# Patient Record
Sex: FEMALE | Race: WHITE | NOT HISPANIC OR LATINO | Employment: OTHER | URBAN - METROPOLITAN AREA
[De-identification: names, ages, dates, MRNs, and addresses within clinical notes are randomized per-mention and may not be internally consistent; named-entity substitution may affect disease eponyms.]

---

## 2022-09-22 ENCOUNTER — APPOINTMENT (EMERGENCY)
Dept: CT IMAGING | Facility: HOSPITAL | Age: 87
End: 2022-09-22
Payer: COMMERCIAL

## 2022-09-22 ENCOUNTER — HOSPITAL ENCOUNTER (OUTPATIENT)
Facility: HOSPITAL | Age: 87
Setting detail: OBSERVATION
Discharge: HOME | End: 2022-09-23
Attending: EMERGENCY MEDICINE | Admitting: HOSPITALIST
Payer: COMMERCIAL

## 2022-09-22 ENCOUNTER — APPOINTMENT (OUTPATIENT)
Dept: RADIOLOGY | Facility: HOSPITAL | Age: 87
End: 2022-09-22
Payer: COMMERCIAL

## 2022-09-22 DIAGNOSIS — R42 VERTIGO: ICD-10-CM

## 2022-09-22 DIAGNOSIS — I15.9 SECONDARY HYPERTENSION: ICD-10-CM

## 2022-09-22 DIAGNOSIS — R42 DIZZINESS: Primary | ICD-10-CM

## 2022-09-22 PROBLEM — I10 HTN (HYPERTENSION): Status: ACTIVE | Noted: 2022-09-22

## 2022-09-22 PROBLEM — I48.0 PAROXYSMAL A-FIB (HCC): Status: ACTIVE | Noted: 2022-09-22

## 2022-09-22 LAB
2HR DELTA HS TROPONIN: -1 NG/L
4HR DELTA HS TROPONIN: -1 NG/L
ALBUMIN SERPL BCP-MCNC: 3.5 G/DL (ref 3.5–5)
ALP SERPL-CCNC: 81 U/L (ref 46–116)
ALT SERPL W P-5'-P-CCNC: 21 U/L (ref 12–78)
ANION GAP SERPL CALCULATED.3IONS-SCNC: 8 MMOL/L (ref 4–13)
APTT PPP: 41 SECONDS (ref 23–37)
AST SERPL W P-5'-P-CCNC: 16 U/L (ref 5–45)
ATRIAL RATE: 54 BPM
ATRIAL RATE: 57 BPM
BACTERIA UR QL AUTO: ABNORMAL /HPF
BASOPHILS # BLD AUTO: 0 THOUSANDS/ΜL (ref 0–0.1)
BASOPHILS NFR BLD AUTO: 0 % (ref 0–1)
BILIRUB DIRECT SERPL-MCNC: 0.13 MG/DL (ref 0–0.2)
BILIRUB SERPL-MCNC: 0.64 MG/DL (ref 0.2–1)
BILIRUB UR QL STRIP: NEGATIVE
BUN SERPL-MCNC: 13 MG/DL (ref 5–25)
CALCIUM SERPL-MCNC: 9.3 MG/DL (ref 8.3–10.1)
CARDIAC TROPONIN I PNL SERPL HS: 10 NG/L
CARDIAC TROPONIN I PNL SERPL HS: 9 NG/L
CARDIAC TROPONIN I PNL SERPL HS: 9 NG/L
CHLORIDE SERPL-SCNC: 97 MMOL/L (ref 96–108)
CLARITY UR: CLEAR
CO2 SERPL-SCNC: 32 MMOL/L (ref 21–32)
COLOR UR: YELLOW
CREAT SERPL-MCNC: 0.77 MG/DL (ref 0.6–1.3)
EOSINOPHIL # BLD AUTO: 0.1 THOUSAND/ΜL (ref 0–0.61)
EOSINOPHIL NFR BLD AUTO: 2 % (ref 0–6)
ERYTHROCYTE [DISTWIDTH] IN BLOOD BY AUTOMATED COUNT: 12.1 % (ref 11.6–15.1)
GFR SERPL CREATININE-BSD FRML MDRD: 68 ML/MIN/1.73SQ M
GLUCOSE SERPL-MCNC: 125 MG/DL (ref 65–140)
GLUCOSE UR STRIP-MCNC: NEGATIVE MG/DL
HCT VFR BLD AUTO: 40 % (ref 34.8–46.1)
HGB BLD-MCNC: 13.4 G/DL (ref 11.5–15.4)
HGB UR QL STRIP.AUTO: ABNORMAL
IMM GRANULOCYTES # BLD AUTO: 0.02 THOUSAND/UL (ref 0–0.2)
IMM GRANULOCYTES NFR BLD AUTO: 0 % (ref 0–2)
INR PPP: 1.33 (ref 0.84–1.19)
KETONES UR STRIP-MCNC: NEGATIVE MG/DL
LEUKOCYTE ESTERASE UR QL STRIP: NEGATIVE
LYMPHOCYTES # BLD AUTO: 1.29 THOUSANDS/ΜL (ref 0.6–4.47)
LYMPHOCYTES NFR BLD AUTO: 21 % (ref 14–44)
MAGNESIUM SERPL-MCNC: 2.1 MG/DL (ref 1.6–2.6)
MCH RBC QN AUTO: 29.5 PG (ref 26.8–34.3)
MCHC RBC AUTO-ENTMCNC: 33.5 G/DL (ref 31.4–37.4)
MCV RBC AUTO: 88 FL (ref 82–98)
MONOCYTES # BLD AUTO: 0.41 THOUSAND/ΜL (ref 0.17–1.22)
MONOCYTES NFR BLD AUTO: 7 % (ref 4–12)
NEUTROPHILS # BLD AUTO: 4.26 THOUSANDS/ΜL (ref 1.85–7.62)
NEUTS SEG NFR BLD AUTO: 70 % (ref 43–75)
NITRITE UR QL STRIP: NEGATIVE
NON-SQ EPI CELLS URNS QL MICRO: ABNORMAL /HPF
NRBC BLD AUTO-RTO: 0 /100 WBCS
P AXIS: 46 DEGREES
P AXIS: 50 DEGREES
PH UR STRIP.AUTO: 7.5 [PH] (ref 4.5–8)
PLATELET # BLD AUTO: 219 THOUSANDS/UL (ref 149–390)
PMV BLD AUTO: 9.2 FL (ref 8.9–12.7)
POTASSIUM SERPL-SCNC: 4 MMOL/L (ref 3.5–5.3)
PR INTERVAL: 228 MS
PR INTERVAL: 242 MS
PROT SERPL-MCNC: 7.5 G/DL (ref 6.4–8.4)
PROT UR STRIP-MCNC: NEGATIVE MG/DL
PROTHROMBIN TIME: 16.4 SECONDS (ref 11.6–14.5)
QRS AXIS: -11 DEGREES
QRS AXIS: 4 DEGREES
QRSD INTERVAL: 88 MS
QRSD INTERVAL: 90 MS
QT INTERVAL: 444 MS
QT INTERVAL: 460 MS
QTC INTERVAL: 432 MS
QTC INTERVAL: 436 MS
RBC # BLD AUTO: 4.55 MILLION/UL (ref 3.81–5.12)
RBC #/AREA URNS AUTO: ABNORMAL /HPF
SODIUM SERPL-SCNC: 137 MMOL/L (ref 135–147)
SP GR UR STRIP.AUTO: 1.02 (ref 1–1.03)
T WAVE AXIS: 16 DEGREES
T WAVE AXIS: 16 DEGREES
TSH SERPL DL<=0.05 MIU/L-ACNC: 1.66 UIU/ML (ref 0.45–4.5)
UROBILINOGEN UR QL STRIP.AUTO: 0.2 E.U./DL
VENTRICULAR RATE: 54 BPM
VENTRICULAR RATE: 57 BPM
WBC # BLD AUTO: 6.08 THOUSAND/UL (ref 4.31–10.16)
WBC #/AREA URNS AUTO: ABNORMAL /HPF

## 2022-09-22 PROCEDURE — 81001 URINALYSIS AUTO W/SCOPE: CPT

## 2022-09-22 PROCEDURE — 85730 THROMBOPLASTIN TIME PARTIAL: CPT | Performed by: EMERGENCY MEDICINE

## 2022-09-22 PROCEDURE — 96360 HYDRATION IV INFUSION INIT: CPT

## 2022-09-22 PROCEDURE — 70450 CT HEAD/BRAIN W/O DYE: CPT

## 2022-09-22 PROCEDURE — 99285 EMERGENCY DEPT VISIT HI MDM: CPT

## 2022-09-22 PROCEDURE — 83735 ASSAY OF MAGNESIUM: CPT | Performed by: EMERGENCY MEDICINE

## 2022-09-22 PROCEDURE — 36415 COLL VENOUS BLD VENIPUNCTURE: CPT | Performed by: EMERGENCY MEDICINE

## 2022-09-22 PROCEDURE — 80048 BASIC METABOLIC PNL TOTAL CA: CPT | Performed by: EMERGENCY MEDICINE

## 2022-09-22 PROCEDURE — 85025 COMPLETE CBC W/AUTO DIFF WBC: CPT | Performed by: EMERGENCY MEDICINE

## 2022-09-22 PROCEDURE — 93010 ELECTROCARDIOGRAM REPORT: CPT | Performed by: INTERNAL MEDICINE

## 2022-09-22 PROCEDURE — 84484 ASSAY OF TROPONIN QUANT: CPT | Performed by: EMERGENCY MEDICINE

## 2022-09-22 PROCEDURE — 71045 X-RAY EXAM CHEST 1 VIEW: CPT

## 2022-09-22 PROCEDURE — 80076 HEPATIC FUNCTION PANEL: CPT | Performed by: EMERGENCY MEDICINE

## 2022-09-22 PROCEDURE — G1004 CDSM NDSC: HCPCS

## 2022-09-22 PROCEDURE — 96361 HYDRATE IV INFUSION ADD-ON: CPT

## 2022-09-22 PROCEDURE — 84443 ASSAY THYROID STIM HORMONE: CPT | Performed by: EMERGENCY MEDICINE

## 2022-09-22 PROCEDURE — 85610 PROTHROMBIN TIME: CPT | Performed by: EMERGENCY MEDICINE

## 2022-09-22 PROCEDURE — 99223 1ST HOSP IP/OBS HIGH 75: CPT | Performed by: HOSPITALIST

## 2022-09-22 PROCEDURE — 99285 EMERGENCY DEPT VISIT HI MDM: CPT | Performed by: EMERGENCY MEDICINE

## 2022-09-22 PROCEDURE — 93005 ELECTROCARDIOGRAM TRACING: CPT

## 2022-09-22 RX ORDER — LANOLIN ALCOHOL/MO/W.PET/CERES
6 CREAM (GRAM) TOPICAL
Status: DISCONTINUED | OUTPATIENT
Start: 2022-09-22 | End: 2022-09-23 | Stop reason: HOSPADM

## 2022-09-22 RX ORDER — SIMVASTATIN 20 MG
1 TABLET ORAL DAILY
COMMUNITY
Start: 2022-09-01

## 2022-09-22 RX ORDER — OMEGA-3S/DHA/EPA/FISH OIL/D3 300MG-1000
1 CAPSULE ORAL DAILY
COMMUNITY

## 2022-09-22 RX ORDER — ASPIRIN 81 MG/1
81 TABLET, CHEWABLE ORAL DAILY
Status: DISCONTINUED | OUTPATIENT
Start: 2022-09-23 | End: 2022-09-23 | Stop reason: HOSPADM

## 2022-09-22 RX ORDER — ENOXAPARIN SODIUM 100 MG/ML
40 INJECTION SUBCUTANEOUS DAILY
Status: DISCONTINUED | OUTPATIENT
Start: 2022-09-23 | End: 2022-09-22 | Stop reason: ALTCHOICE

## 2022-09-22 RX ORDER — HYDROCHLOROTHIAZIDE 25 MG/1
25 TABLET ORAL DAILY
COMMUNITY
Start: 2015-04-29

## 2022-09-22 RX ORDER — METOPROLOL TARTRATE 100 MG/1
1 TABLET ORAL 2 TIMES DAILY
COMMUNITY
Start: 2015-04-29

## 2022-09-22 RX ORDER — AMLODIPINE BESYLATE 10 MG/1
10 TABLET ORAL DAILY
Status: ON HOLD | COMMUNITY
Start: 2022-04-21 | End: 2022-09-23 | Stop reason: SDUPTHER

## 2022-09-22 RX ORDER — ZINC GLUCONATE 50 MG
50 TABLET ORAL DAILY
COMMUNITY

## 2022-09-22 RX ORDER — HYDRALAZINE HYDROCHLORIDE 20 MG/ML
10 INJECTION INTRAMUSCULAR; INTRAVENOUS EVERY 6 HOURS PRN
Status: DISCONTINUED | OUTPATIENT
Start: 2022-09-22 | End: 2022-09-23 | Stop reason: HOSPADM

## 2022-09-22 RX ORDER — HYDRALAZINE HYDROCHLORIDE 20 MG/ML
10 INJECTION INTRAMUSCULAR; INTRAVENOUS EVERY 6 HOURS PRN
Status: DISCONTINUED | OUTPATIENT
Start: 2022-09-22 | End: 2022-09-22

## 2022-09-22 RX ORDER — ACETAMINOPHEN 325 MG/1
650 TABLET ORAL EVERY 4 HOURS PRN
Status: DISCONTINUED | OUTPATIENT
Start: 2022-09-22 | End: 2022-09-23 | Stop reason: HOSPADM

## 2022-09-22 RX ORDER — ATORVASTATIN CALCIUM 40 MG/1
40 TABLET, FILM COATED ORAL EVERY EVENING
Status: DISCONTINUED | OUTPATIENT
Start: 2022-09-22 | End: 2022-09-23 | Stop reason: HOSPADM

## 2022-09-22 RX ADMIN — RIVAROXABAN 20 MG: 20 TABLET, FILM COATED ORAL at 19:23

## 2022-09-22 RX ADMIN — ATORVASTATIN CALCIUM 40 MG: 40 TABLET, FILM COATED ORAL at 18:34

## 2022-09-22 RX ADMIN — HYDRALAZINE HYDROCHLORIDE 10 MG: 20 INJECTION, SOLUTION INTRAMUSCULAR; INTRAVENOUS at 19:23

## 2022-09-22 RX ADMIN — Medication 6 MG: at 23:29

## 2022-09-22 RX ADMIN — SODIUM CHLORIDE 500 ML: 0.9 INJECTION, SOLUTION INTRAVENOUS at 09:53

## 2022-09-22 RX ADMIN — HYDRALAZINE HYDROCHLORIDE 10 MG: 20 INJECTION INTRAMUSCULAR; INTRAVENOUS at 15:07

## 2022-09-22 NOTE — ASSESSMENT & PLAN NOTE
Will hold her home blood pressure medications until we completely rule out stroke  Put p r n   Hydralazine for SBP greater than 180    If MRI brain is negative, we can hopefully bring the blood pressure down

## 2022-09-22 NOTE — H&P
[address] [place] [address]  H&P- Sia Moon 1933, 80 y o  female MRN: [id number]  Unit/Bed#: E4 -01 Encounter: [id number]  Primary Care Provider: No primary care provider on file  Date and time admitted to hospital: 9/22/2022  8:53 AM    * Vertigo  Assessment & Plan  Patient woke up this morning with room spinning sensation when she got up from bed  She walked to the bathroom to urinate and came back and was still not feeling great  She went back to sleep and got up and happen again and she almost fell but set down quickly on the toilet  It is not associated with moving her head though  In the emergency room she is completely asymptomatic now but is noted to have accelerated hypertension was not taking her Norvasc appropriately    She has no focal deficits  It may just be vertigo related to the accelerated hypertension or BPPV  There is the minimal likely headache could be a stroke    CT head negative    Will put MRI brain in for completeness and put on stroke pathway  Will slowly bring down the blood pressure just with p r n  Hydralazine for SBP greater than 180  Last Neurology to see her in consultation  Get PT eval    HTN (hypertension)  Assessment & Plan  Will hold her home blood pressure medications until we completely rule out stroke  Put p r n  Hydralazine for SBP greater than 180    If MRI brain is negative, we can hopefully bring the blood pressure down    Paroxysmal A-fib ([address] )  Assessment & Plan  She is chronically on Xarelto        Chief Complaint:   Dizziness      History of Present Illness:    Sia Moon is a 80 y o  female who presents with dizziness  Patient has been in her normal state of health except for her daughter's found at this morning she was not taking her Norvasc appropriately  She states she got out of bed to urinate this morning and had room spinning sensation  It was not associated specifically with moving her head    She just did not feel well   She went to the bathroom came back and was still having the symptoms when she laid down  She went to sleep  Woke up again and was having dizziness again  She called her daughter who suggested she come to the emergency room  In the emergency room her blood pressure was significantly elevated with systolic blood pressure greater than 200  But her symptoms had improved  At daughters had found out she was not taking her Norvasc appropriately  Patient has no sided weakness  No sensory deficits  No facial droop  No vision changes  She has no focal deficits  Her dizziness has completely resolved  No recent trauma to the head  No headaches  She has never had these symptoms before  And currently she is completely asymptomatic  Adal Mtz Review of Systems:    Review of Systems   Constitutional: Negative for chills and fever  HENT: Negative for ear pain and sore throat  Eyes: Negative for pain and visual disturbance  Respiratory: Negative for cough and shortness of breath  Cardiovascular: Negative for chest pain and palpitations  Gastrointestinal: Negative for abdominal pain and vomiting  Genitourinary: Negative for dysuria and hematuria  Musculoskeletal: Negative for arthralgias and back pain  Skin: Negative for color change and rash  Neurological: Positive for dizziness  Negative for seizures and syncope  All other systems reviewed and are negative  Past Medical and Surgical History:     No past medical history on file  No past surgical history on file  Home Medications:    Prior to Admission medications    Medication Sig Start Date End Date Taking?  Authorizing Provider   amLODIPine (NORVASC) 10 mg tablet Take 10 mg by mouth daily 4/21/22 4/21/23 Yes Historical Provider, MD   cholecalciferol (VITAMIN D3) 400 units tablet Take 1 tablet by mouth daily   Yes Historical Provider, MD   hydrochlorothiazide (HYDRODIURIL) 25 mg tablet Take 25 mg by mouth daily 4/29/15  Yes Historical Provider, MD   metoprolol tartrate (LOPRESSOR) 100 mg tablet Take 1 tablet by mouth 2 (two) times a day 4/29/15  Yes Historical Provider, MD   rivaroxaban (XARELTO) 20 mg tablet Take 1 tablet by mouth daily 4/29/15  Yes Historical Provider, MD   simvastatin (ZOCOR) 20 mg tablet Take 1 tablet by mouth daily 9/1/22  Yes Historical Provider, MD   zinc gluconate 50 mg tablet Take 50 mg by mouth daily   Yes Historical Provider, MD     I have reviewed home medications with patient personally  Allergies: Allergies   Allergen Reactions    Lisinopril Hives     Other reaction(s): Angioedema    Oxycodone-Acetaminophen Rash and GI Intolerance         Social History:    Substance Use History:   Social History     Substance and Sexual Activity   Alcohol Use Not on file     Social History     Tobacco Use   Smoking Status Not on file   Smokeless Tobacco Not on file     Social History     Substance and Sexual Activity   Drug Use Not on file         Family History:    non-contributory      Physical Exam:     Vitals:   Blood Pressure: (!) 179/88 (09/22/22 1630)  Pulse: 68 (09/22/22 1630)  Temperature: (!) 96 8 °F (36 °C) (09/22/22 1630)  Temp Source: Temporal (09/22/22 1630)  Respirations: 16 (09/22/22 1630)  SpO2: 96 % (09/22/22 1630)    Physical Exam  Vitals and nursing note reviewed  HENT:      Head: Normocephalic and atraumatic  Eyes:      Pupils: Pupils are equal, round, and reactive to light  Cardiovascular:      Rate and Rhythm: Normal rate and regular rhythm  Heart sounds: No murmur heard  No friction rub  No gallop  Pulmonary:      Effort: Pulmonary effort is normal       Breath sounds: Normal breath sounds  No wheezing or rales  Abdominal:      General: Bowel sounds are normal       Palpations: Abdomen is soft  Tenderness: There is no abdominal tenderness  Musculoskeletal:      Right lower leg: No edema  Left lower leg: No edema  Neurological:      Cranial Nerves:  No cranial nerve deficit  Sensory: No sensory deficit  Motor: No weakness  Comments: Negative [place] maneuver       Additional Data:     Lab Results: I have personally reviewed pertinent reports  Results from last 7 days   Lab Units 09/22/22  0949   WBC Thousand/uL 6 08   HEMOGLOBIN g/dL 13 4   HEMATOCRIT % 40 0   PLATELETS Thousands/uL 219   NEUTROS PCT % 70   LYMPHS PCT % 21   MONOS PCT % 7   EOS PCT % 2     Results from last 7 days   Lab Units 09/22/22  0949   POTASSIUM mmol/L 4 0   CHLORIDE mmol/L 97   CO2 mmol/L 32   BUN mg/dL 13   CREATININE mg/dL 0 77   CALCIUM mg/dL 9 3   ALK PHOS U/L 81   ALT U/L 21   AST U/L 16     Results from last 7 days   Lab Units 09/22/22  0949   INR  1 33*                 Imaging: I have personally reviewed pertinent reports  CT head without contrast   Final Result by Brandi Santos MD (09/22 4887)      No acute intracranial abnormality  Workstation performed: [id number]         XR chest 1 view portable   Final Result by Arcadio Boast, MD (09/22 1132)      No acute cardiopulmonary disease  Workstation performed: [id number]         MRI brain wo contrast    (Results Pending)         EKG, Pathology, and Other Studies Reviewed on Admission:   · EKG: will order  ·     VTE Prophylaxis: Enoxaparin (Lovenox)        Anticipated Length of Stay:  Patient will be admitted on an inpatient basis with an anticipated length of stay of  Greater than 2 midnights  Justification for Hospital Stay: patient needs stroke workup and treatment for accerlated HTn      Total Time for Visit, including Counseling / Coordination of Care: 45 minutes  Greater than 50% of this total time spent on direct patient counseling and coordination of care  ** Please Note: This note has been constructed using a voice recognition system   **

## 2022-09-22 NOTE — ASSESSMENT & PLAN NOTE
Patient woke up this morning with room spinning sensation when she got up from bed  She walked to the bathroom to urinate and came back and was still not feeling great  She went back to sleep and got up and happen again and she almost fell but set down quickly on the toilet  It is not associated with moving her head though  In the emergency room she is completely asymptomatic now but is noted to have accelerated hypertension was not taking her Norvasc appropriately    She has no focal deficits  It may just be vertigo related to the accelerated hypertension or BPPV  There is the minimal likely headache could be a stroke    CT head negative    Will put MRI brain in for completeness and put on stroke pathway  Will slowly bring down the blood pressure just with p r n   Hydralazine for SBP greater than 180  Last Neurology to see her in consultation  Get PT moises

## 2022-09-23 ENCOUNTER — APPOINTMENT (OUTPATIENT)
Dept: NON INVASIVE DIAGNOSTICS | Facility: HOSPITAL | Age: 87
End: 2022-09-23
Payer: COMMERCIAL

## 2022-09-23 ENCOUNTER — APPOINTMENT (OUTPATIENT)
Dept: MRI IMAGING | Facility: HOSPITAL | Age: 87
End: 2022-09-23
Payer: COMMERCIAL

## 2022-09-23 VITALS
RESPIRATION RATE: 18 BRPM | DIASTOLIC BLOOD PRESSURE: 79 MMHG | HEART RATE: 60 BPM | TEMPERATURE: 97.6 F | OXYGEN SATURATION: 96 % | HEIGHT: 60 IN | SYSTOLIC BLOOD PRESSURE: 172 MMHG | WEIGHT: 249.12 LBS | BODY MASS INDEX: 48.91 KG/M2

## 2022-09-23 PROBLEM — R29.90 STROKE-LIKE SYMPTOMS: Status: ACTIVE | Noted: 2022-09-22

## 2022-09-23 PROBLEM — E78.2 MIXED HYPERLIPIDEMIA: Status: ACTIVE | Noted: 2022-09-23

## 2022-09-23 LAB
AORTIC ROOT: 3.7 CM
AORTIC VALVE MEAN VELOCITY: 10 M/S
APICAL FOUR CHAMBER EJECTION FRACTION: 56 %
ASCENDING AORTA: 3.9 CM
AV LVOT MEAN GRADIENT: 2 MMHG
AV LVOT PEAK GRADIENT: 4 MMHG
AV MEAN GRADIENT: 5 MMHG
AV PEAK GRADIENT: 9 MMHG
AV VELOCITY RATIO: 0.64
CHOLEST SERPL-MCNC: 161 MG/DL
DOP CALC AO PEAK VEL: 1.52 M/S
DOP CALC AO VTI: 37.14 CM
DOP CALC LVOT PEAK VEL VTI: 25.08 CM
DOP CALC LVOT PEAK VEL: 0.97 M/S
E WAVE DECELERATION TIME: 351 MS
EST. AVERAGE GLUCOSE BLD GHB EST-MCNC: 131 MG/DL
FRACTIONAL SHORTENING: 36 (ref 28–44)
HBA1C MFR BLD: 6.2 %
HDLC SERPL-MCNC: 50 MG/DL
INTERVENTRICULAR SEPTUM IN DIASTOLE (PARASTERNAL SHORT AXIS VIEW): 1.3 CM
INTERVENTRICULAR SEPTUM: 1.3 CM (ref 0.6–1.1)
IVC: 25 MM
LAAS-AP2: 31.7 CM2
LAAS-AP4: 18.8 CM2
LDLC SERPL CALC-MCNC: 89 MG/DL (ref 0–100)
LEFT ATRIUM SIZE: 4.6 CM
LEFT INTERNAL DIMENSION IN SYSTOLE: 3 CM (ref 2.1–4)
LEFT VENTRICULAR INTERNAL DIMENSION IN DIASTOLE: 4.7 CM (ref 3.5–6)
LEFT VENTRICULAR POSTERIOR WALL IN END DIASTOLE: 1.4 CM
LEFT VENTRICULAR STROKE VOLUME: 67 ML
LVSV (TEICH): 67 ML
MV E'TISSUE VEL-LAT: 10 CM/S
MV E'TISSUE VEL-SEP: 6 CM/S
MV PEAK A VEL: 0.55 M/S
MV PEAK E VEL: 61 CM/S
MV STENOSIS PRESSURE HALF TIME: 102 MS
MV VALVE AREA P 1/2 METHOD: 2.16
RA PRESSURE ESTIMATED: 8 MMHG
RIGHT ATRIAL 2D VOLUME: 61 ML
RIGHT ATRIUM AREA SYSTOLE A4C: 23.3 CM2
RIGHT VENTRICLE ID DIMENSION: 4.2 CM
RV PSP: 38 MMHG
SL CV LEFT ATRIUM LENGTH A2C: 7 CM
SL CV LV EF: 60
SL CV PED ECHO LEFT VENTRICLE DIASTOLIC VOLUME (MOD BIPLANE) 2D: 101 ML
SL CV PED ECHO LEFT VENTRICLE SYSTOLIC VOLUME (MOD BIPLANE) 2D: 34 ML
TR MAX PG: 30 MMHG
TR PEAK VELOCITY: 2.8 M/S
TRICUSPID VALVE PEAK REGURGITATION VELOCITY: 2.75 M/S
TRIGL SERPL-MCNC: 112 MG/DL

## 2022-09-23 PROCEDURE — C8929 TTE W OR WO FOL WCON,DOPPLER: HCPCS

## 2022-09-23 PROCEDURE — 97166 OT EVAL MOD COMPLEX 45 MIN: CPT

## 2022-09-23 PROCEDURE — 83036 HEMOGLOBIN GLYCOSYLATED A1C: CPT | Performed by: HOSPITALIST

## 2022-09-23 PROCEDURE — 99204 OFFICE O/P NEW MOD 45 MIN: CPT | Performed by: PSYCHIATRY & NEUROLOGY

## 2022-09-23 PROCEDURE — 70551 MRI BRAIN STEM W/O DYE: CPT

## 2022-09-23 PROCEDURE — 80061 LIPID PANEL: CPT | Performed by: HOSPITALIST

## 2022-09-23 PROCEDURE — G1004 CDSM NDSC: HCPCS

## 2022-09-23 PROCEDURE — 97163 PT EVAL HIGH COMPLEX 45 MIN: CPT

## 2022-09-23 PROCEDURE — 99239 HOSP IP/OBS DSCHRG MGMT >30: CPT | Performed by: HOSPITALIST

## 2022-09-23 PROCEDURE — 93306 TTE W/DOPPLER COMPLETE: CPT | Performed by: INTERNAL MEDICINE

## 2022-09-23 RX ORDER — AMLODIPINE BESYLATE 10 MG/1
10 TABLET ORAL 2 TIMES DAILY
Qty: 60 TABLET | Refills: 0 | Status: SHIPPED | OUTPATIENT
Start: 2022-09-23 | End: 2022-10-10 | Stop reason: SDUPTHER

## 2022-09-23 RX ORDER — HYDROCHLOROTHIAZIDE 25 MG/1
25 TABLET ORAL DAILY
Status: DISCONTINUED | OUTPATIENT
Start: 2022-09-23 | End: 2022-09-23 | Stop reason: HOSPADM

## 2022-09-23 RX ORDER — AMLODIPINE BESYLATE 10 MG/1
10 TABLET ORAL DAILY
Qty: 60 TABLET | Refills: 1 | Status: SHIPPED | OUTPATIENT
Start: 2022-09-23 | End: 2022-09-23 | Stop reason: SDUPTHER

## 2022-09-23 RX ORDER — AMLODIPINE BESYLATE 10 MG/1
10 TABLET ORAL DAILY
Status: DISCONTINUED | OUTPATIENT
Start: 2022-09-23 | End: 2022-09-23 | Stop reason: HOSPADM

## 2022-09-23 RX ORDER — METOPROLOL TARTRATE 100 MG/1
100 TABLET ORAL 2 TIMES DAILY
Status: DISCONTINUED | OUTPATIENT
Start: 2022-09-23 | End: 2022-09-23 | Stop reason: HOSPADM

## 2022-09-23 RX ADMIN — RIVAROXABAN 20 MG: 20 TABLET, FILM COATED ORAL at 15:51

## 2022-09-23 RX ADMIN — ASPIRIN 81 MG CHEWABLE TABLET 81 MG: 81 TABLET CHEWABLE at 09:00

## 2022-09-23 RX ADMIN — HYDROCHLOROTHIAZIDE 25 MG: 25 TABLET ORAL at 12:24

## 2022-09-23 RX ADMIN — PERFLUTREN 0.6 ML/MIN: 6.52 INJECTION, SUSPENSION INTRAVENOUS at 13:56

## 2022-09-23 RX ADMIN — ATORVASTATIN CALCIUM 40 MG: 40 TABLET, FILM COATED ORAL at 17:49

## 2022-09-23 RX ADMIN — METOPROLOL TARTRATE 100 MG: 100 TABLET, FILM COATED ORAL at 17:49

## 2022-09-23 RX ADMIN — METOPROLOL TARTRATE 100 MG: 100 TABLET, FILM COATED ORAL at 12:24

## 2022-09-23 RX ADMIN — AMLODIPINE BESYLATE 10 MG: 10 TABLET ORAL at 12:24

## 2022-09-23 NOTE — PROGRESS NOTES
[address] [place] [address]  Progress Note Jarrett Jurado 1933, 80 y o  female MRN: [id number]  Unit/Bed#: E4 -01 Encounter: [id number]  Primary Care Provider: No primary care provider on file  Date and time admitted to hospital: 9/22/2022  8:53 AM    * Vertigo  Assessment & Plan  · Patient presented to ED with complaint of room spinning sensation when she got up yesterday  · CT head negative for acute abnormalities  · Admitted for stroke pathway  · Neurology consult  · MRI/echo pending  · Continue telemetry monitoring  · Continue anticoagulation, aspirin and statin  · PT/OT consults     Mixed hyperlipidemia  Assessment & Plan  · History of hyperlipidemia  · Maintain outpatient on daily statin, continue  · Repeat lipid panel today WNL    Paroxysmal A-fib (HCC)  Assessment & Plan  · History of proximal atrial fibrillation  · Continue anticoagulation with Xarelto  · Metoprolol on hold given permissive hypertension    HTN (hypertension)  Assessment & Plan  · BP elevated at time of admission  · Improved with IV hydralazine  · Maintained outpatient on losartan, metoprolol and HCTZ, currently on hold to allow for permissive hypertension in the setting of stroke workup        VTE Pharmacologic Prophylaxis: VTE Score: 4 Moderate Risk (Score 3-4) - Pharmacological DVT Prophylaxis Ordered: rivaroxaban (Xarelto)  Patient Centered Rounds: I performed bedside rounds with nursing staff today  Discussions with Specialists or Other Care Team Provider: neurology     Time Spent for Care: 30 minutes  More than 50% of total time spent on counseling and coordination of care as described above  Current Length of Stay: 0 day(s)  Current Patient Status: Observation   Certification Statement: The patient, admitted on an observation basis, will now require > 2 midnight hospital stay due to pending MRI   Discharge Plan: Anticipate discharge in 24-48 hrs to home      Code Status: Level 1 - Full Code    Subjective:   Patient notes she is feeling okay today  Notes she still does not feel completely back to baseline but reports less dizziness  Still feeling somewhat off balance  Denies any pain or shortness of breath  Objective:     Vitals:   Temp (24hrs), Av 4 °F (36 3 °C), Min:96 8 °F (36 °C), Max:98 4 °F (36 9 °C)    Temp:  [96 8 °F (36 °C)-98 4 °F (36 9 °C)] 97 6 °F (36 4 °C)  HR:  [58-72] 72  Resp:  [16-18] 18  BP: (125-195)/(59-93) 160/72  SpO2:  [91 %-97 %] 96 %  Body mass index is 48 65 kg/m²  Input and Output Summary (last 24 hours): Intake/Output Summary (Last 24 hours) at 2022 1127  Last data filed at 2022 2200  Gross per 24 hour   Intake 480 ml   Output --   Net 480 ml       Physical Exam:   Physical Exam  Vitals reviewed  Constitutional:       General: She is not in acute distress  HENT:      Head: Normocephalic and atraumatic  Eyes:      General: No scleral icterus  Conjunctiva/sclera: Conjunctivae normal    Cardiovascular:      Rate and Rhythm: Normal rate and regular rhythm  Heart sounds: No murmur heard  Pulmonary:      Effort: Pulmonary effort is normal  No respiratory distress  Breath sounds: Normal breath sounds  Abdominal:      General: Bowel sounds are normal  There is no distension  Palpations: Abdomen is soft  Tenderness: There is no abdominal tenderness  Musculoskeletal:      Cervical back: Neck supple  Right lower leg: No edema  Left lower leg: No edema  Skin:     General: Skin is warm and dry  Neurological:      Mental Status: She is alert and oriented to person, place, and time     Psychiatric:         Mood and Affect: Mood normal          Behavior: Behavior normal           Additional Data:     Labs:  Results from last 7 days   Lab Units 22  0949   WBC Thousand/uL 6 08   HEMOGLOBIN g/dL 13 4   HEMATOCRIT % 40 0   PLATELETS Thousands/uL 219   NEUTROS PCT % 70   LYMPHS PCT % 21   MONOS PCT % 7   EOS PCT % 2     Results from last 7 days   Lab Units 09/22/22  0949   SODIUM mmol/L 137   POTASSIUM mmol/L 4 0   CHLORIDE mmol/L 97   CO2 mmol/L 32   BUN mg/dL 13   CREATININE mg/dL 0 77   ANION GAP mmol/L 8   CALCIUM mg/dL 9 3   ALBUMIN g/dL 3 5   TOTAL BILIRUBIN mg/dL 0 64   ALK PHOS U/L 81   ALT U/L 21   AST U/L 16   GLUCOSE RANDOM mg/dL 125     Results from last 7 days   Lab Units 09/22/22  0949   INR  1 33*         Results from last 7 days   Lab Units 09/23/22  0450   HEMOGLOBIN A1C % 6 2*           Lines/Drains:  Invasive Devices  Report    Peripheral Intravenous Line  Duration           Peripheral IV 09/22/22 Left Antecubital 1 day                  Telemetry:  Telemetry Orders (From admission, onward)             48 Hour Telemetry Monitoring  Continuous x 48 hours        References:    Telemetry Guidelines   Question:  Reason for 48 Hour Telemetry  Answer:  Acute CVA (<24 hrs old, hemispheric strokes, selected brainstem strokes, cardiac arrhythmias)                 Telemetry Reviewed: Normal Sinus Rhythm  Indication for Continued Telemetry Use: Acute CVA             Imaging: No pertinent imaging reviewed  Recent Cultures (last 7 days):         Last 24 Hours Medication List:   Current Facility-Administered Medications   Medication Dose Route Frequency Provider Last Rate    acetaminophen  650 mg Oral Q4H PRN Susan Alva PA-C      aspirin  81 mg Oral Daily Austin S Prechtel,       atorvastatin  40 mg Oral QPM Austin S Prechtel, DO      hydrALAZINE  10 mg Intravenous Q6H PRN Clerance Diana Prechtel, DO      melatonin  6 mg Oral HS Sofia Barrientos PA-C      rivaroxaban  20 mg Oral Daily With Linda De Santiago DO          Today, Patient Was Seen By: Greg Parish PA-C    **Please Note: This note may have been constructed using a voice recognition system  **

## 2022-09-23 NOTE — ASSESSMENT & PLAN NOTE
-normotensive BP goal  -management per Primary Team  -PRN hydralazine for SBP > 180 mmHg  -home medications: Amlodipine 10 mg daily, and HCTZ 25 mg daily

## 2022-09-23 NOTE — ASSESSMENT & PLAN NOTE
-[id number] stroke risk 4  -follows with [facility] Cardiologist Dr Shivam Wilkins  -11/2021 ECHO normal EF 65%   MCOT with Afib [place] 3%, average rate 117  -continued on home Xarelto 20 mg daily, metoprolol 100 mg BID

## 2022-09-23 NOTE — SPEECH THERAPY NOTE
Speech Language/Pathology  Speech/Language Pathology  Assessment    Patient Name: Ainsley Brower  ZEPWN'D Date: 9/23/2022     Problem List  Principal Problem:    Vertigo  Active Problems:    HTN (hypertension)    Paroxysmal A-fib ([address] )    Past Medical History  History reviewed  No pertinent past medical history  Past Surgical History  History reviewed  No pertinent surgical history  Pt screened  Daughter at chairside  No ST needs identified  Will d/c order  Chief Complaint:   Dizziness  History of Present Illness:  Ainsley Brower is a 80 y o  female who presents with dizziness  Patient has been in her normal state of health except for her daughter's found at this morning she was not taking her Norvasc appropriately  She states she got out of bed to urinate this morning and had room spinning sensation  It was not associated specifically with moving her head  She just did not feel well  She went to the bathroom came back and was still having the symptoms when she laid down  She went to sleep  Woke up again and was having dizziness again  She called her daughter who suggested she come to the emergency room  In the emergency room her blood pressure was significantly elevated with systolic blood pressure greater than 200  But her symptoms had improved  At daughters had found out she was not taking her Norvasc appropriately  Patient has no sided weakness  No sensory deficits  No facial droop  No vision changes  She has no focal deficits  Her dizziness has completely resolved  No recent trauma to the head  No headaches  She has never had these symptoms before  And currently she is completely asymptomatic       Ct head neg acute  MRI pending

## 2022-09-23 NOTE — PLAN OF CARE
Problem: PAIN - ADULT  Goal: Verbalizes/displays adequate comfort level or baseline comfort level  Description: Interventions:  - Encourage patient to monitor pain and request assistance  - Assess pain using appropriate pain scale  - Administer analgesics based on type and severity of pain and evaluate response  - Implement non-pharmacological measures as appropriate and evaluate response  - Consider cultural and social influences on pain and pain management  - Notify physician/advanced practitioner if interventions unsuccessful or patient reports new pain  Outcome: Progressing     Problem: SAFETY ADULT  Goal: Patient will remain free of falls  Description: INTERVENTIONS:  - Educate patient/family on patient safety including physical limitations  - Instruct patient to call for assistance with activity   - Consult OT/PT to assist with strengthening/mobility   - Keep Call bell within reach  - Keep bed low and locked with side rails adjusted as appropriate  - Keep care items and personal belongings within reach  - Initiate and maintain comfort rounds  - Make Fall Risk Sign visible to staff  - Offer Toileting every 2 Hours, in advance of need  - Initiate/Maintain bed alarm  - Obtain necessary fall risk management equipment: bed alarm, room close to desk  - Apply yellow socks and bracelet for high fall risk patients  - Consider moving patient to room near nurses station  Outcome: Progressing     Problem: DISCHARGE PLANNING  Goal: Discharge to home or other facility with appropriate resources  Description: INTERVENTIONS:  - Identify barriers to discharge w/patient and caregiver  - Arrange for needed discharge resources and transportation as appropriate  - Identify discharge learning needs (meds, wound care, etc )  - Refer to Case Management Department for coordinating discharge planning if the patient needs post-hospital services based on physician/advanced practitioner order or complex needs related to functional status, cognitive ability, or social support system  Outcome: Progressing     Problem: Knowledge Deficit  Goal: Patient/family/caregiver demonstrates understanding of disease process, treatment plan, medications, and discharge instructions  Description: Complete learning assessment and assess knowledge base  Interventions:  - Provide teaching at level of understanding  - Provide teaching via preferred learning methods  Outcome: Progressing     Problem: CARDIOVASCULAR - ADULT  Goal: Maintains optimal cardiac output and hemodynamic stability  Description: INTERVENTIONS:  - Monitor I/O, vital signs and rhythm  - Monitor for S/S and trends of decreased cardiac output  - Administer and titrate ordered vasoactive medications to optimize hemodynamic stability  - Assess quality of pulses, skin color and temperature  - Assess for signs of decreased coronary artery perfusion  - Instruct patient to report change in severity of symptoms  Outcome: Progressing  Goal: Absence of cardiac dysrhythmias or at baseline rhythm  Description: INTERVENTIONS:  - Continuous cardiac monitoring, vital signs, obtain 12 lead EKG if ordered  - Administer antiarrhythmic and heart rate control medications as ordered  - Monitor electrolytes and administer replacement therapy as ordered  Outcome: Progressing     Problem: Neurological Deficit  Goal: Neurological status is stable or improving  Description: Interventions:  - Monitor and assess patient's level of consciousness, motor function, sensory function, and level of assistance needed for ADLs  - Monitor and report changes from baseline  Collaborate with interdisciplinary team to initiate plan and implement interventions as ordered  - Provide and maintain a safe environment  - Consider seizure precautions  - Consider fall precautions  - Consider aspiration precautions  - Consider bleeding precautions  Outcome: Progressing     Problem:  Activity Intolerance/Impaired Mobility  Goal: Mobility/activity is maintained at optimum level for patient  Description: Interventions:  - Assess and monitor patient  barriers to mobility and need for assistive/adaptive devices  - Assess patient's emotional response to limitations  - Collaborate with interdisciplinary team and initiate plans and interventions as ordered  - Encourage independent activity per ability   - Maintain proper body alignment  - Perform active/passive rom as tolerated/ordered  - Plan activities to conserve energy   - Turn patient as appropriate  Outcome: Progressing     Problem: Communication Impairment  Goal: Ability to express needs and understand communication  Description: Assess patient's communication skills and ability to understand information  Patient will demonstrate use of effective communication techniques, alternative methods of communication and understanding even if not able to speak  - Encourage communication and provide alternate methods of communication as needed  - Collaborate with case management/ for discharge needs  - Include patient/family/caregiver in decisions related to communication    Outcome: Progressing

## 2022-09-23 NOTE — ASSESSMENT & PLAN NOTE
79 yo female with HTN, HLD, PAF on Xarelto who presented to ED on 9/22/22 with intermittent episodes of dizziness and gait imbalance starting at 2am and progressively worsening  Patient described dizziness initially was room spinning dizziness, then at 7am felt "off balance" with difficulty standing/ambulating  BP on admission 191/101  CTH with no acute intracranial pathology  She was admitted to the stroke pathway       Plan:  -MRI brain wo contrast  -Echocardiogram  -telemetry monitoring for arrhythmia  -BP control with normotensive BP goal  -recommend euglycemia, HgA1c 6 2  -continue Xarelto for Afib    -continue home statin, LDL 89, HDL 50, triglycerides 112  -monitor neuro exam, Stat CTH with changes and notify neurology  -PT/OT  -Neurology with continue to follow

## 2022-09-23 NOTE — ASSESSMENT & PLAN NOTE
· History of proximal atrial fibrillation  · Continue anticoagulation with Xarelto  · Resume metoprolol

## 2022-09-23 NOTE — PHYSICAL THERAPY NOTE
PT EVALUATION    80 y o     [id number]    Dizziness [R42]    History reviewed  No pertinent past medical history  History reviewed  No pertinent surgical history  09/23/22 1128   PT Last Visit   PT Visit Date 09/23/22   Note Type   Note type Evaluation   Pain Assessment   Pain Assessment Tool 0-10   Pain Score No Pain   Restrictions/Precautions   Weight Bearing Precautions Per Order No   Other Precautions Chair Alarm; Bed Alarm;Telemetry; Fall Risk   Home Living   Type of [address] One level;Stairs to enter with rails  (1 F[address])   Bathroom Shower/Tub Walk-in shower   Bathroom Toilet Standard   Bathroom Equipment Toilet raiser;Commode   [address] Walker;Cane   Prior Function   Level of [place] Independent with ADLs and functional mobility   Lives With [address] Help From Family  (3 local children  Dtr stops by after work everyday  Another daughter stops by often  Granddaughter lives across the yard )   [id number] [employer] in the last 6 months 1 to 4   Vocational Retired   General   Family/Caregiver Present Yes   Cognition   Overall Cognitive Status WFL   Arousal/Participation Alert   Attention Attends with cues to redirect   Orientation Level Oriented X4   Memory Within functional limits   Following Commands Follows one step commands without difficulty   RLE Assessment   RLE Assessment X   Strength RLE   R Hip Flexion 4-/5   R Knee Flexion 4/5   R Knee Extension 4-/5   R Ankle Dorsiflexion 4/5   LLE Assessment   LLE Assessment X   Strength LLE   L Hip Flexion 4-/5   L Knee Flexion 4/5   L Knee Extension 4-/5   L Ankle Dorsiflexion 4/5   Vision-Basic Assessment   Current Vision Wears glasses all the time   Bed Mobility   Additional Comments Received pt sitting OOB in chair  Transfers   Sit to Stand 5  Supervision   Additional items Armrests; Increased time required;Verbal cues   Stand to Sit 5  Supervision Additional items Armrests; Increased time required;Verbal cues   Toilet transfer 5  Supervision   Additional items Increased time required;Commode   Additional Comments Cues for safety   Ambulation/Elevation   Gait pattern Wide ABELINO; Decreased foot clearance; Short stride; Excessively slow   Gait Assistance 5  Supervision   Additional items Verbal cues   Assistive Device Standard walker;None   Distance 21' with Std walker; 20' without AD   Balance   Static Sitting Fair +   Dynamic Sitting Fair   Static Standing Fair   Dynamic Standing Fair -   Ambulatory Fair -   Endurance Deficit   Endurance Deficit Yes   Activity Tolerance   Activity Tolerance Patient limited by fatigue   Nurse Made Aware yes, Keith Sim RN   Assessment   Prognosis Good   Problem List Decreased strength;Decreased endurance; Impaired balance;Decreased mobility; Decreased safety awareness   Assessment Pt is an 80 y o  female admitted to [facility] on 9/22/22 with c/o dizziness  PT consulted with eval and treat and activity as tolerated orders  Pt lives alone in a [facility], but has supportive family nearby  At baseline, pt is independent with ADLs and ambulation without AD  Upon evaluation, pt was at a supervision level  Pt presents with weakness, impaired balance, impaired endurance, gait dysfunction and decreased safety awareness  The patient's AM-PAC Basic Mobility Inpatient Short Form Raw Score is 20  A Raw score of greater than 16 suggests the patient may benefit from discharge to home  Please also refer to the recommendation of the Physical Therapist for safe discharge planning  PT to follow 3-5x/wk during acute stay to address deficits  Recommend Home PT to maximize safe mobility and function  Barriers to Discharge None   Goals   Patient Goals to go home   STG Expiration Date 10/07/22   Short Term Goal #1 1)  Pt will perform bed mobility with Merle demonstrating appropriate technique 100% of the time in order to improve function  2)  Perform all transfers with Merle demonstrating safe and appropriate technique 100% of the time in order to improve ability to negotiate safely in home environment  3) Amb with least restrictive AD > 200'x1 with mod I in order to demonstrate ability to negotiate in home environment  4)  Improve overall strength and balance 1/2 grade in order to optimize ability to perform functional tasks and reduce fall risk  5) Increase activity tolerance to 45 minutes in order to improve endurance to functional tasks  6)  Negotiate stairs using most appropriate technique and S in order to be able to negotiate safely in home environment  7) PT for ongoing patient and family/caregiver education, DME needs and d/c planning in order to promote highest level of function in least restrictive environment  PT Treatment Day 0   Plan   Treatment/Interventions Functional transfer training;LE strengthening/ROM; Elevations; Therapeutic exercise; Endurance training;Patient/family training;Equipment eval/education; Bed mobility;Gait training;Spoke to nursing;OT   PT Frequency 3-5x/wk   Recommendation   PT Discharge Recommendation Home with home health rehabilitation   Equipment Recommended Other (Comment)  (Recommend use of RW (pt has))   AM-PAC Basic Mobility Inpatient   Turning in Bed Without Bedrails 4   Lying on Back to Sitting on Edge of Flat Bed 4   Moving Bed to Chair 3   Standing Up From Chair 3   Walk in Room 3   Climb 3-5 Stairs 3   Basic Mobility Inpatient Raw Score 20   Basic Mobility Standardized Score 43 99   Highest Level Of Mobility   JH-HLM Goal 6: Walk 10 steps or more   JH-HLM Achieved 6: Walk 10 steps or more   End of Consult   Patient Position at End of Consult Bedside chair;Bed/Chair alarm activated; All needs within reach     Complexity: High  History: lives alone, Stairs in home environment, advanced age  Examination: impairments in strength, balance, endurance, locomotion, knowledge of precautions  Presentation: [employer] [place] Coreen Gilman, PT

## 2022-09-23 NOTE — ASSESSMENT & PLAN NOTE
She had hypertensive urgency on admission  She was not taking her norvasc as directed  She is on metoprolol, HCTZ, Norvasc, but her systolic blood pressure is still running in the 170s  Will send her out on BID norvasc  Daughters will check blood pressures each morning with parameters to call PCP if too high or too low

## 2022-09-23 NOTE — CONSULTS
Consultation - Neurology   Anival Koehler 80 y o  female MRN: [id number]  Unit/Bed#: E4 -01 Encounter: [id number]      Assessment/Plan     * Stroke-like symptoms: vertigo and gait imbalance  Assessment & Plan  81 yo female with HTN, HLD, PAF on Xarelto who presented to ED on 9/22/22 with intermittent episodes of dizziness and gait imbalance starting at 2am and progressively worsening  Patient described dizziness initially was room spinning dizziness, then at 7am felt "off balance" with difficulty standing/ambulating  BP on admission 191/101  CTH with no acute intracranial pathology  She was admitted to the stroke pathway  Plan:  -MRI brain wo contrast  -Echocardiogram  -telemetry monitoring for arrhythmia  -BP control with normotensive BP goal  -recommend euglycemia, HgA1c 6 2  -continue Xarelto for Afib    -continue home statin, LDL 89, HDL 50, triglycerides 112  -monitor neuro exam, Stat CTH with changes and notify neurology  -PT/OT  -Neurology with continue to follow      Paroxysmal A-fib [facility])  Assessment & Plan  -[id number]-ptkm stroke risk 4  -follows with [facility] Cardiologist Dr Queenie Herrera  -11/2021 ECHO normal EF 65%  MCOT with Afib [place] 3%, average rate 117  -continued on home Xarelto 20 mg daily, metoprolol 100 mg BID    HTN (hypertension)  Assessment & Plan  -normotensive BP goal  -management per Primary Team  -PRN hydralazine for SBP > 180 mmHg  -home medications: Amlodipine 10 mg daily, and HCTZ 25 mg daily    Results reviewed:   - labs WNL:  LDL 89, HDL 50, triglycerides 112, TSH 1 656, Trop Hs 10, 9, 9,  UA negative  - CT head:no acute intracranial abnormality      Recommendations for outpatient neurological follow up have yet to be determined      History of Present Illness     Reason for Consult / Principal Problem: vertigo  Hx and PE limited by: none  HPI: Anival Koehler is a 80 y o  female with HTN, HLD, PAF on Xarelto who presented to ED on 9/22/22 with intermittent episodes of dizziness and gait imbalance  While in ED,  she reported a mild ache in left frontal region  /101, HR 59, SpO2 97% on room air and afebrile 97 7F  CT head with no acute intracranial pathology  Per patient, she awoke at 2 am to go to the bathroom  On her return to her bed she had room spinning dizziness  She awoke at 330 and 5 am again and felt off balance, holding on to things to make her way to the bathroom again  Then at 7 am while going to the bathroom, Dizziness worsened  She lowered herself to the floor until she felt safe to get up  She then was able to call her family  At that time she had no numbness/tingling/weaklness of an arm or a leg, no vision or hearing difficulties  She did notice her voice was more "raspy" talking to her daughter in law  She had a dull ache in the front of her head but no headache  She reports she took her am medications but only took half of the pill of amlodipine  Currently she reports she does "not feel quite right" in her head  "something is off"   She does not describe it as dizziness but does state the dizziness has come and gone over the last 24 hours  She also notes she had pain in the left posterior neck to the occiput overnight, but currently does not have a headache  She has not had vertigo in the past and is not one to get headaches  Her voice is  "raspy still", but not as bad as yesterday    She has not had any palpitations today or during the episodes of dizziness  She does endorse she has PAF which when present she can feel in there chest  She has no numbness/tingling/weakness, no vision or hearing difficulties at this time  She has not been ill lately, around sick contacts  Her only new medication is amlodipine which was per chart review increased in April 2022  Inpatient consult to Neurology  Consult performed by: JAILYN Pulido  Consult ordered by: Rodgers Cranker, DO        Review of Systems   Constitutional: Negative      HENT: Positive for voice change (described as raspy)  Eyes: Negative  Respiratory: Negative  Cardiovascular: Negative  Gastrointestinal: Negative  Genitourinary: Negative  Musculoskeletal: Negative  Skin: Negative  Neurological: Positive for dizziness (upon standing)  Negative for syncope, speech difficulty, weakness, numbness and headaches  Psychiatric/Behavioral: Negative  Historical Information   History reviewed  No pertinent past medical history  History reviewed  No pertinent surgical history  Social History   Social History     Substance and Sexual Activity   Alcohol Use Never     Social History     Substance and Sexual Activity   Drug Use Never     E-Cigarette/Vaping    E-Cigarette Use Never User      E-Cigarette/Vaping Substances     Social History     Tobacco Use   Smoking Status Never Smoker   Smokeless Tobacco Never Used     Family History: History reviewed  No pertinent family history  Review of previous medical records was  completed  Meds/Allergies   all current active meds have been reviewed and PTA meds:   Prior to Admission Medications   Prescriptions Last Dose Informant Patient Reported? Taking?    amLODIPine (NORVASC) 10 mg tablet 9/22/2022 at Unknown time  Yes Yes   Sig: Take 10 mg by mouth daily   cholecalciferol (VITAMIN D3) 400 units tablet 9/21/2022 at Unknown time  Yes Yes   Sig: Take 1 tablet by mouth daily   hydrochlorothiazide (HYDRODIURIL) 25 mg tablet 9/22/2022 at Unknown time  Yes Yes   Sig: Take 25 mg by mouth daily   metoprolol tartrate (LOPRESSOR) 100 mg tablet 9/22/2022 at Unknown time  Yes Yes   Sig: Take 1 tablet by mouth 2 (two) times a day   rivaroxaban (XARELTO) 20 mg tablet 9/21/2022 at Unknown time  Yes Yes   Sig: Take 1 tablet by mouth daily   simvastatin (ZOCOR) 20 mg tablet 9/22/2022 at Unknown time  Yes Yes   Sig: Take 1 tablet by mouth daily   zinc gluconate 50 mg tablet 9/21/2022 at Unknown time  Yes Yes   Sig: Take 50 mg by mouth daily      Facility-Administered Medications: None       Allergies   Allergen Reactions    Lisinopril Hives     Other reaction(s): Angioedema    Oxycodone-Acetaminophen Rash and GI Intolerance       Objective   Vitals:Blood pressure 160/72, pulse 72, temperature 97 6 °F (36 4 °C), temperature source Temporal, resp  rate 18, height 5' (1 524 m), weight 113 kg (249 lb 1 9 oz), SpO2 96 %  ,Body mass index is 48 65 kg/m²  Intake/Output Summary (Last 24 hours) at 9/23/2022 0851  Last data filed at 9/22/2022 2200  Gross per 24 hour   Intake 480 ml   Output --   Net 480 ml       Invasive Devices: Invasive Devices  Report    Peripheral Intravenous Line  Duration           Peripheral IV 09/22/22 Left Antecubital <1 day                Physical Exam  Constitutional:       General: She is not in acute distress  Appearance: She is not ill-appearing  HENT:      Head: Atraumatic  Mouth/Throat:      Mouth: Mucous membranes are moist    Eyes:      Extraocular Movements: Extraocular movements intact and EOM normal       Conjunctiva/sclera: Conjunctivae normal       Pupils: Pupils are equal, round, and reactive to light  Cardiovascular:      Rate and Rhythm: Normal rate and regular rhythm  Heart sounds: Normal heart sounds  No murmur heard  Pulmonary:      Effort: No respiratory distress  Breath sounds: Normal breath sounds  Abdominal:      General: Bowel sounds are normal  There is no distension  Palpations: Abdomen is soft  Musculoskeletal:         General: No tenderness  Normal range of motion  Cervical back: Normal range of motion  Right lower leg: Edema present  Left lower leg: Edema present  Skin:     General: Skin is warm and dry  Neurological:      Mental Status: She is alert and oriented to person, place, and time        Coordination: Finger-Nose-Finger Test normal       Deep Tendon Reflexes:      Reflex Scores:       Bicep reflexes are 1+ on the right side and 1+ on the left side  Brachioradialis reflexes are 1+ on the right side and 1+ on the left side  Patellar reflexes are 0 on the right side and 0 on the left side  Achilles reflexes are 0 on the right side and 0 on the left side  Psychiatric:         Mood and Affect: Mood normal          Speech: Speech normal          Behavior: Behavior normal        Neurologic Exam     Mental Status   Oriented to person, place, and time  Oriented to person  Oriented to place  Oriented to city  Oriented to year, month and date  Follows 3 step commands  Attention: normal  Concentration: normal    Speech: speech is normal (Raspy sounding)  Level of consciousness: alert  Knowledge: good  Able to perform simple calculations  Able to name object  Able to read  Able to repeat  Cranial Nerves     CN II   Visual fields full to confrontation  CN III, IV, VI   Pupils are equal, round, and reactive to light  Extraocular motions are normal    Right pupil: Size: 2 mm  Left pupil: Size: 2 mm  CN V   Facial sensation intact  CN VII   Facial expression full, symmetric  CN VIII   Hearing: intact    CN IX, X   Palate: symmetric    CN XI   CN XI normal      CN XII   Tongue deviation: none    Motor Exam   Muscle bulk: normal  Overall muscle tone: normal  Right arm pronator drift: absent  Left arm pronator drift: absentFull strength 5/5 all extremities     Sensory Exam   Sensation intact to touch, temperature and vibratory sense     Gait, Coordination, and Reflexes     Coordination   Finger to nose coordination: normal    Tremor   Resting tremor: absent    Reflexes   Right brachioradialis: 1+  Left brachioradialis: 1+  Right biceps: 1+  Left biceps: 1+  Right patellar: 0  Left patellar: 0  Right achilles: 0  Left achilles: 0  Right plantar: equivocal  Left plantar: equivocal      Lab Results:   I have personally reviewed pertinent reports      CBC:   Results from last 7 days   Lab Units 09/22/22  0949   WBC Thousand/uL 6 08   RBC Million/uL 4 55   HEMOGLOBIN g/dL 13 4   HEMATOCRIT % 40 0   MCV fL 88   PLATELETS Thousands/uL 219   BMP/CMP:   Results from last 7 days   Lab Units 09/22/22  0949   SODIUM mmol/L 137   POTASSIUM mmol/L 4 0   CHLORIDE mmol/L 97   CO2 mmol/L 32   BUN mg/dL 13   CREATININE mg/dL 0 77   CALCIUM mg/dL 9 3   AST U/L 16   ALT U/L 21   ALK PHOS U/L 81   EGFR ml/min/1 73sq m 68   TSH:   Results from last 7 days   Lab Units 09/22/22  0949   TSH 3RD GENERATON uIU/mL 1 656   Coagulation:   Results from last 7 days   Lab Units 09/22/22  0949   INR  1 33*   Lipid Profile:   Results from last 7 days   Lab Units 09/23/22  0450   HDL mg/dL 50   LDL CALC mg/dL 89   TRIGLYCERIDES mg/dL 112    Urinalysis:   Results from last 7 days   Lab Units 09/22/22  1126   COLOR UA  Yellow   CLARITY UA  Clear   SPEC GRAV UA  1 020   PH UA  7 5   LEUKOCYTES UA  Negative   NITRITE UA  Negative   GLUCOSE UA mg/dl Negative   KETONES UA mg/dl Negative   BILIRUBIN UA  Negative   BLOOD UA  Trace*     Imaging Studies: I have personally reviewed pertinent reports  and I have personally reviewed pertinent films in PACS     EKG, Pathology, and Other Studies: I have personally reviewed pertinent reports     and I have personally reviewed pertinent films in PACS     VTE Prophylaxis: Sequential compression device (Venodyne) and Xarelto    Code Status: Level 1 - Full Code

## 2022-09-23 NOTE — ASSESSMENT & PLAN NOTE
· History of hyperlipidemia  · Maintain outpatient on daily statin, continue  · Repeat lipid panel today WNL

## 2022-09-23 NOTE — ASSESSMENT & PLAN NOTE
· Patient presented to ED with complaint of room spinning sensation when she got up yesterday  · CT head negative for acute abnormalities  · Admitted for stroke pathway  · May all be related to hypertensive encephalopathy, resume home BP medications and monitor  · Neurology consult  · MRI/echo pending  · Continue telemetry monitoring  · Continue anticoagulation, aspirin and statin  · PT/OT consults

## 2022-09-23 NOTE — OCCUPATIONAL THERAPY NOTE
Occupational Therapy Evaluation     Patient Name: Bartolome MICHEL Date: 9/23/2022  Problem List  Principal Problem:    Vertigo  Active Problems:    HTN (hypertension)    Paroxysmal A-fib (HCC)    Mixed hyperlipidemia    Past Medical History  History reviewed  No pertinent past medical history  Past Surgical History  History reviewed  No pertinent surgical history  09/23/22 0918   OT Last Visit   OT Visit Date 09/23/22   Note Type   Note type Evaluation   Restrictions/Precautions   Weight Bearing Precautions Per Order No   Other Precautions Chair Alarm; Bed Alarm;Telemetry; Fall Risk   Pain Assessment   Pain Assessment Tool 0-10   Pain Score No Pain   Home Living   Type of [address] [place] [address] One level;Stairs to enter with rails  (1 FÉLIX)   Bathroom Shower/Tub Walk-in shower   Bathroom Toilet Standard  (w/ raiser over top)   [address] raiser;Commode   [address] Walker;Cane   Additional Comments Pt lives alone in a one level house with 1 FÉLIX  Pt has supportive family who are able to assist as needed  Prior Function   Level of [place] Independent with ADLs and functional mobility   Lives With Alone   Receives Help From Family  (3 local children; 1 dtr visits daily after work, 1 dtr is retired and stops in often, son livse 1 block away)   ADL [facility] in the last 6 months 1 to 4  (1 fall OOB)   Vocational Retired   Comments At baseline, pt was I w/ ADLs, IADLs, and functional transfers/mobility w/o use of AD  (+)   (+) fall PTA  Lifestyle   Autonomy At baseline, pt was I w/ ADLs, IADLs, and functional transfers/mobility w/o use of AD  (+)   (+) fall PTA     Reciprocal Relationships 2 dtrs, 1 son   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   Psychosocial   Psychosocial ([address]) [facility]   Where Inocente Barro [address] 7  Independent Grooming Assistance 5  Supervision/Setup   UB Bathing Assistance 5  Supervision/Setup   LB Bathing Assistance 4  Minimal Assistance   UB Dressing Assistance 5  Supervision/Setup   LB Dressing Assistance 4  Minimal Assistance   Toileting Assistance  4  Minimal Assistance   Functional Assistance 5  Supervision/Setup   Bed Mobility   Supine to Sit Unable to assess   Sit to Supine Unable to assess   Additional Comments Pt seated OOB in chair with chair alarm activated at end of session  Call bell and phone within reach  All needs met and pt reports no further questions for OT at this time  Transfers   Sit to Stand 5  Supervision   Additional items Assist x 1;Bedrails;Armrests; Increased time required;Verbal cues   Stand to Sit 5  Supervision   Additional items Armrests; Increased time required;Verbal cues   Toilet transfer 5  Supervision   Additional items Increased time required;Verbal cues; Commode   Additional Comments Cues for safe technique and hand placement   Functional Mobility   Functional Mobility 5  Supervision   Additional Comments Assist x1   Additional items Rolling walker   Balance   Static Sitting Fair +   Dynamic Sitting Fair   Static Standing Fair   Dynamic Standing Fair -   Ambulatory Fair -   Activity Tolerance   Activity Tolerance Patient limited by fatigue;Patient tolerated treatment well   Medical Staff Made Zina Tillman, RN   Nurse Made Aware yes   RUE Assessment   RUE Assessment WFL  (4-/5 throughout)   LUE Assessment   LUE Assessment WFL  (4-/5 throughout)   Hand Function   Gross Motor Coordination Functional   Fine Motor Coordination Functional   Sensation   Light Touch Partial deficits in the RUE;Partial deficits in the LUE   Proprioception   Proprioception No apparent deficits   Vision-Basic Assessment   Current Vision Wears glasses all the time   Vision - Complex Assessment   Ocular Range of Motion [facility] Able to read clock/calendar on wall without difficulty; Able to read employee name badge without difficulty   Perception   Inattention/Neglect Appears intact   Cognition   Overall Cognitive Status [facility]   Arousal/Participation Alert; Cooperative   Attention Attends with cues to redirect   Orientation Level Oriented X4   Memory Within functional limits   Following Commands Follows one step commands with increased time or repetition   Comments Pleasant and cooperative  Increased processing time noted   Assessment   Limitation Decreased ADL status; Decreased UE strength;Decreased endurance;Decreased self-care trans;Decreased high-level ADLs; Decreased sensation   Prognosis Good   Assessment Pt is a 80 y o  female seen for OT evaluation s/p adm to [address] on 9/22/2022 w/ Vertigo and HTN  CT head (-)  MRI brain pending  Pt with active OT orders and activity orders for Up with assistance  Pt lives alone in a one level house with 1 FÉLIX  Pt has supportive family who are able to assist as needed  At baseline, pt was I w/ ADLs, IADLs, and functional transfers/mobility w/o use of AD  (+)   (+) fall PTA  Upon evaluation, pt currently requires Supervision for UB ADLs, Min A for LB ADLs, Min A for toileting, and Supervision for functional mobility/transfers 2* the following deficits impacting occupational performance: decreased strength, decreased balance, decreased tolerance and impaired sensation  These impairments, as well at pts fall risk, steps to enter environment, limited home support, difficulty performing ADLS and difficulty performing IADLS  limit pts ability to safely engage in all baseline areas of occupation  Based on the aforementioned OT evaluation, functional performance deficits, and assessments, pt has been identified as a Moderate complexity evaluation   Pt to continue to benefit from continued acute OT services during hospital stay to address defined deficits and to maximize level of functional independence in the following Occupational Performance areas: grooming, bathing/shower, toilet hygiene, dressing, medication management, health maintenance, functional mobility, community mobility, clothing management, cleaning, meal prep and household maintenance  From OT standpoint, recommend Home OT upon D/C  OT will continue to follow pt 2-3x/wk to address the following goals to  w/in 10-14 days:   Goals   Patient Goals To go home   LTG Time Frame 10-14   Long Term Goal Please refer to LTGs listed below   Plan   Treatment Interventions ADL retraining;Functional transfer training;UE strengthening/ROM; Endurance training;Patient/family training;Equipment evaluation/education; Compensatory technique education;Continued evaluation; Energy conservation; Activityengagement   Goal Expiration Date 10/07/22   OT Treatment Day 0   OT Frequency 2-3x/wk   Recommendation   OT Discharge Recommendation Home with home health rehabilitation   Additional Comments  The patient's raw score on the AM-PAC Daily Activity inpatient short form is 19, standardized score is 40 22, greater than 39 4  Patients at this level are likely to benefit from discharge to home  Please refer to the recommendation of the Occupational Therapist for safe discharge planning     AM-PAC Daily Activity Inpatient   Lower Body Dressing 3   Bathing 3   Toileting 3   Upper Body Dressing 3   Grooming 3   Eating 4   Daily Activity Raw Score 19   Daily Activity Standardized Score (Calc for Raw Score >=11) 40 22   AM-PAC Applied Cognition Inpatient   Following a Speech/Presentation 4   Understanding Ordinary Conversation 4   Taking Medications 4   Remembering Where Things Are Placed or Put Away 4   Remembering List of 4-5 Errands 3   Taking Care of Complicated Tasks 3   Applied Cognition Raw Score 22   Applied Cognition Standardized Score 47 83       GOALS    Pt will improve activity tolerance to G for min 30 min txment sessions for increase engagement in functional tasks    Pt will complete bed mobility at a Mod I level w/ G balance/safety demonstrated to decrease caregiver assistance required     Pt will complete UB dressing/self care w/ mod I using adaptive device and DME as needed     Pt will complete LB dressing/self care w/ mod I using adaptive device and DME as needed    Pt will complete toileting w/ mod I w/ G hygiene/thoroughness using DME as needed    Pt will improve functional transfers to Mod I on/off all surfaces using DME as needed w/ G balance/safety     Pt will improve functional mobility during ADL/IADL/leisure tasks to Mod I using DME as needed w/ G balance/safety     Pt will be attentive 100% of the time during ongoing cognitive assessment w/ G participation to assist w/ safe d/c planning/recommendations    Pt will demonstrate G carryover of pt/caregiver education and training as appropriate w/o cues w/ good tolerance to increase safety during functional tasks    Pt will increase BUE strength by 1MM grade via AROM exercises to increase independence in ADLs and transfers    Pt will verbalize 3 potential fall hazards and identify appropriate compensatory techniques to decrease fall risk in home environment     Pt will increase standing tolerance to 8-10 mins with Fair+ dynamic standing balance to increase safety during participation in ADLs       Jodie Serrano, OTR/L

## 2022-09-23 NOTE — NURSING NOTE
IV/Tele removed prior to discharge  Discharge instructions reviewed with patients daughter at bed side

## 2022-09-23 NOTE — DISCHARGE INSTR - AVS FIRST PAGE
Check blood pressure each morning  If the top number (systolic blood pressure) is greater than 170, then call you doctor  If the top number is less than 120 or you have symptoms of light headedness or fatigue, then call your doctor too  The blood pressure may be running too low  [facility] Cardiologist that you may like    Dr Alnanah Ingram Few

## 2022-09-23 NOTE — PLAN OF CARE
Problem: PAIN - ADULT  Goal: Verbalizes/displays adequate comfort level or baseline comfort level  Description: Interventions:  - Encourage patient to monitor pain and request assistance  - Assess pain using appropriate pain scale  - Administer analgesics based on type and severity of pain and evaluate response  - Implement non-pharmacological measures as appropriate and evaluate response  - Consider cultural and social influences on pain and pain management  - Notify physician/advanced practitioner if interventions unsuccessful or patient reports new pain  Outcome: Progressing     Problem: SAFETY ADULT  Goal: Patient will remain free of falls  Description: INTERVENTIONS:  - Educate patient/family on patient safety including physical limitations  - Instruct patient to call for assistance with activity   - Consult OT/PT to assist with strengthening/mobility   - Keep Call bell within reach  - Keep bed low and locked with side rails adjusted as appropriate  - Keep care items and personal belongings within reach  - Initiate and maintain comfort rounds  - Make Fall Risk Sign visible to staff  - Offer Toileting every 2 Hours, in advance of need  - Initiate/Maintain bed alarm  - Obtain necessary fall risk management equipment: bed alarm, room close to desk  - Apply yellow socks and bracelet for high fall risk patients  - Consider moving patient to room near nurses station  Outcome: Progressing     Problem: DISCHARGE PLANNING  Goal: Discharge to home or other facility with appropriate resources  Description: INTERVENTIONS:  - Identify barriers to discharge w/patient and caregiver  - Arrange for needed discharge resources and transportation as appropriate  - Identify discharge learning needs (meds, wound care, etc )  - Refer to Case Management Department for coordinating discharge planning if the patient needs post-hospital services based on physician/advanced practitioner order or complex needs related to functional status, cognitive ability, or social support system  Outcome: Progressing     Problem: Knowledge Deficit  Goal: Patient/family/caregiver demonstrates understanding of disease process, treatment plan, medications, and discharge instructions  Description: Complete learning assessment and assess knowledge base  Interventions:  - Provide teaching at level of understanding  - Provide teaching via preferred learning methods  Outcome: Progressing     Problem: CARDIOVASCULAR - ADULT  Goal: Maintains optimal cardiac output and hemodynamic stability  Description: INTERVENTIONS:  - Monitor I/O, vital signs and rhythm  - Monitor for S/S and trends of decreased cardiac output  - Administer and titrate ordered vasoactive medications to optimize hemodynamic stability  - Assess quality of pulses, skin color and temperature  - Assess for signs of decreased coronary artery perfusion  - Instruct patient to report change in severity of symptoms  Outcome: Progressing  Goal: Absence of cardiac dysrhythmias or at baseline rhythm  Description: INTERVENTIONS:  - Continuous cardiac monitoring, vital signs, obtain 12 lead EKG if ordered  - Administer antiarrhythmic and heart rate control medications as ordered  - Monitor electrolytes and administer replacement therapy as ordered  Outcome: Progressing     Problem: Neurological Deficit  Goal: Neurological status is stable or improving  Description: Interventions:  - Monitor and assess patient's level of consciousness, motor function, sensory function, and level of assistance needed for ADLs  - Monitor and report changes from baseline  Collaborate with interdisciplinary team to initiate plan and implement interventions as ordered  - Provide and maintain a safe environment  - Consider seizure precautions  - Consider fall precautions  - Consider aspiration precautions  - Consider bleeding precautions  Outcome: Progressing     Problem:  Activity Intolerance/Impaired Mobility  Goal: Mobility/activity is maintained at optimum level for patient  Description: Interventions:  - Assess and monitor patient  barriers to mobility and need for assistive/adaptive devices  - Assess patient's emotional response to limitations  - Collaborate with interdisciplinary team and initiate plans and interventions as ordered  - Encourage independent activity per ability   - Maintain proper body alignment  - Perform active/passive rom as tolerated/ordered  - Plan activities to conserve energy   - Turn patient as appropriate  Outcome: Progressing     Problem: Communication Impairment  Goal: Ability to express needs and understand communication  Description: Assess patient's communication skills and ability to understand information  Patient will demonstrate use of effective communication techniques, alternative methods of communication and understanding even if not able to speak  - Encourage communication and provide alternate methods of communication as needed  - Collaborate with case management/ for discharge needs  - Include patient/family/caregiver in decisions related to communication    Outcome: Progressing     Problem: MOBILITY - ADULT  Goal: Maintain or return to baseline ADL function  Description: INTERVENTIONS:  -  Assess patient's ability to carry out ADLs; assess patient's baseline for ADL function and identify physical deficits which impact ability to perform ADLs (bathing, care of mouth/teeth, toileting, grooming, dressing, etc )  - Assess/evaluate cause of self-care deficits   - Assess range of motion  - Assess patient's mobility; develop plan if impaired  - Assess patient's need for assistive devices and provide as appropriate  - Encourage maximum independence but intervene and supervise when necessary  - Involve family in performance of ADLs  - Assess for home care needs following discharge   - Consider OT consult to assist with ADL evaluation and planning for discharge  - Provide patient education as appropriate  Outcome: Progressing  Goal: Maintains/Returns to pre admission functional level  Description: INTERVENTIONS:  - Perform BMAT or MOVE assessment daily    - Set and communicate daily mobility goal to care team and patient/family/caregiver  - Collaborate with rehabilitation services on mobility goals if consulted  - Perform Range of Motion times a day  - Reposition patient every  hours    - Dangle patient times a day  - Stand patient  times a day  - Ambulate patient times a day  - Out of bed to chair times a day   - Out of bed for meals  times a day  - Out of bed for toileting  - Record patient progress and toleration of activity level   Outcome: Progressing

## 2022-09-23 NOTE — UTILIZATION REVIEW
Initial Clinical Review    Admission: Date/Time/Statement:   Admission Orders (From admission, onward)     Ordered        09/22/22 1140  Place in Observation  Once                      Orders Placed This Encounter   Procedures    Place in Observation     Standing Status:   Standing     Number of Occurrences:   1     Order Specific Question:   Level of Care     Answer:   Med Surg [16]     ED Arrival Information     Expected   -    Arrival   9/22/2022 08:52    Acuity   Emergent            Means of arrival   Wheelchair    Escorted by   Family Member    Service   Hospitalist    Admission type   Emergency            Arrival complaint   dizziness,nausea           Chief Complaint   Patient presents with    Dizziness     Pt reports woke up around 2am to go to bathroom and felt dizzy  Pt reports /83  Denies CP        Initial Presentation: 80 y o  female presents to the ED from home with c/o persistent dizziness, not feeling well  Family noted pt is not taking Norvasc properly  PMH: HTN, PAF on Xarelto  In the ED she was HTN and symptoms had resolved  She was treated with IV fluids, IV Hydralazine  troponins WNL  On exam she had Negative [employer] maneuver and no deficits  She is admitted to OBSERVATION status with Vertigo - MRI Brain,neuro consult, PT eval   HTN -  PRN Hydralazine IV for SBP > 180  Date: 9/23:  BP improving overnight with 2 doses of IV Hydralazine  MRI Brain pending  On exam pt is feeling some improvement but is not back to baseline  Feels off balance  No SOB  BP improved       ED Triage Vitals   Temperature Pulse Respirations Blood Pressure SpO2   09/22/22 1212 09/22/22 0901 09/22/22 0901 09/22/22 0901 09/22/22 0901   97 7 °F (36 5 °C) 59 17 (!) 196/101 97 %      Temp Source Heart Rate Source Patient Position - Orthostatic VS BP Location FiO2 (%)   09/22/22 1212 09/22/22 0901 09/22/22 0901 09/22/22 0901 --   Oral Monitor Sitting Right arm       Pain Score       09/22/22 0939 No Pain          Wt Readings from Last 1 Encounters:   09/22/22 113 kg (249 lb 1 9 oz)     Additional Vital Signs:   09/23/22 0833 97 6 °F (36 4 °C) 72 18 160/72 -- 96 % -- Lying   09/23/22 0230 -- 64 -- 152/73 -- 95 % -- --   09/23/22 0030 -- 61 -- 149/69 -- 91 % -- --   09/22/22 2230 97 3 °F (36 3 °C) Abnormal  67 16 149/67 97 94 % None (Room air) Lying   09/22/22 2030 98 4 °F (36 9 °C) 69 16 125/59 85 95 % None (Room air) Lying   09/22/22 1945 -- -- -- -- -- -- None (Room air) --   09/22/22 1830 97 7 °F (36 5 °C) 67 16 184/80 Abnormal  124 97 % None (Room air) Lying   09/22/22 1730 96 9 °F (36 1 °C) Abnormal  67 16 179/83 Abnormal  119 95 % None (Room air) Lying   09/22/22 1630 96 8 °F (36 °C) Abnormal  68 16 179/88 Abnormal  112 96 % None (Room air) Lying   09/22/22 1528 96 9 °F (36 1 °C) Abnormal  60 16 156/81 113 97 % None (Room air) Lying   09/22/22 1511 -- 58 16 173/93 Abnormal  -- 97 % -- Lying   09/22/22 1507 -- -- -- 173/93 Abnormal  -- -- -- --   09/22/22 1414 -- 59 16 195/90 Abnormal  -- 97 % None (Room air) Sitting   09/22/22 1212 97 7 °F (36 5 °C) -- -- -- -- -- -- --   09/22/22 1016 -- 54 Abnormal  17 187/81 Abnormal  117 96 % None (Room air) Lying     Pertinent Labs/Diagnostic Test Results:   CT head without contrast   Final Result by Luann Fall MD (09/22 2061)      No acute intracranial abnormality  XR chest 1 view portable   Final Result by Marshall Luo MD (09/22 4163)      No acute cardiopulmonary disease        MRI brain wo contrast    (Results Pending)            Results from last 7 days   Lab Units 09/22/22  0949   WBC Thousand/uL 6 08   HEMOGLOBIN g/dL 13 4   HEMATOCRIT % 40 0   PLATELETS Thousands/uL 219   NEUTROS ABS Thousands/µL 4 26         Results from last 7 days   Lab Units 09/22/22  0949   SODIUM mmol/L 137   POTASSIUM mmol/L 4 0   CHLORIDE mmol/L 97   CO2 mmol/L 32   ANION GAP mmol/L 8   BUN mg/dL 13   CREATININE mg/dL 0 77   EGFR ml/min/1 73sq m 68   CALCIUM mg/dL 9 3   MAGNESIUM mg/dL 2 1     Results from last 7 days   Lab Units 09/22/22  0949   AST U/L 16   ALT U/L 21   ALK PHOS U/L 81   TOTAL PROTEIN g/dL 7 5   ALBUMIN g/dL 3 5   TOTAL BILIRUBIN mg/dL 0 64   BILIRUBIN DIRECT mg/dL 0 13         Results from last 7 days   Lab Units 09/22/22  0949   GLUCOSE RANDOM mg/dL 125     Results from last 7 days   Lab Units 09/22/22  1337 09/22/22  1206 09/22/22  0949   HS TNI 0HR ng/L  --   --  10   HS TNI 2HR ng/L  --  9  --    HSTNI D2 ng/L  --  -1  --    HS TNI 4HR ng/L 9  --   --    HSTNI D4 ng/L -1  --   --          Results from last 7 days   Lab Units 09/22/22  0949   PROTIME seconds 16 4*   INR  1 33*   PTT seconds 41*     Results from last 7 days   Lab Units 09/22/22  0949   TSH 3RD GENERATON uIU/mL 1 656     Results from last 7 days   Lab Units 09/22/22  1126   CLARITY UA  Clear   COLOR UA  Yellow   SPEC GRAV UA  1 020   PH UA  7 5   GLUCOSE UA mg/dl Negative   KETONES UA mg/dl Negative   BLOOD UA  Trace*   PROTEIN UA mg/dl Negative   NITRITE UA  Negative   BILIRUBIN UA  Negative   UROBILINOGEN UA E U /dl 0 2   LEUKOCYTES UA  Negative   WBC UA /hpf 0-1*   RBC UA /hpf 0-1*   BACTERIA UA /hpf Innumerable*   EPITHELIAL CELLS WET PREP /hpf Occasional       ED Treatment:   Medication Administration from 09/22/2022 0851 to 09/22/2022 1532       Date/Time Order Dose Route Action     09/22/2022 0953 sodium chloride 0 9 % bolus 500 mL 500 mL Intravenous New Bag     09/22/2022 1507 hydrALAZINE (APRESOLINE) injection 10 mg 10 mg Intravenous Given          Admitting Diagnosis: Dizziness [R42]  Age/Sex: 80 y o  female  Admission Orders:  Scheduled Medications:  aspirin, 81 mg, Oral, Daily  atorvastatin, 40 mg, Oral, QPM  melatonin, 6 mg, Oral, HS  rivaroxaban, 20 mg, Oral, Daily With Dinner      Continuous IV Infusions:     PRN Meds:  acetaminophen, 650 mg, Oral, Q4H PRN  hydrALAZINE, 10 mg, Intravenous, Q6H PRN - x 2 9/22    Tele  Neuro checks Every 1 hour x 4 hours, then every 2 hours x 4, then every 4 hours x 72 hours                 Incentive spirometry  NIHSS  Up w/ assist   IP CONSULT TO NEUROLOGY  IP CONSULT TO CASE MANAGEMENT  IP CONSULT TO NUTRITION SERVICES    Network Utilization Review Department  ATTENTION: Please call with any questions or concerns to [phone] and carefully listen to the prompts so that you are directed to the right person  All voicemails are confidential   Shanti Mancini all requests for admission clinical reviews, approved or denied determinations and any other requests to dedicated fax number below belonging to the campus where the patient is receiving treatment   List of dedicated fax numbers for the Facilities:  [address] DENIALS (Administrative/Medical Necessity) [phone]   [address] (Maternity/NICU/Pediatrics) [phone]   [address]  [address] [facility] [place] [address] [address] [place] [address] Inocente Lorena Braxton [address] [facility] [address] [facility] [phone]

## 2022-09-23 NOTE — ASSESSMENT & PLAN NOTE
· BP elevated at time of admission  · Improved with IV hydralazine  · Maintained outpatient on losartan, metoprolol and HCTZ, currently on hold to allow for permissive hypertension in the setting of stroke workup- will resume

## 2022-09-23 NOTE — CASE MANAGEMENT
Case Management Discharge Planning Note    Patient name Daisy Mathias  Location [address] [place] [address] /E4 [address]-* MRN [id number]  : 1933 Date 2022       Current Admission Date: 2022  Current Admission Diagnosis:Stroke-like symptoms: vertigo and gait imbalance   Patient Active Problem List    Diagnosis Date Noted    Mixed hyperlipidemia 2022    Stroke-like symptoms: vertigo and gait imbalance 2022    HTN (hypertension) 2022    Paroxysmal A-fib ([address] ) 2022      LOS (days): 0  Geometric Mean LOS (GMLOS) (days):   Days to GMLOS:     OBJECTIVE:            Current admission status: Observation   Preferred Pharmacy:   [address], [address] [place] [address]  Phone: [phone] Fax: [phone]    Primary Care Provider: No primary care provider on file  Primary Insurance: [facility]  Secondary Insurance:     DISCHARGE DETAILS:    Discharge planning discussed with[de-identified] dtr  Freedom of Choice: Yes     CM contacted family/caregiver?: Yes  Were Treatment Team discharge recommendations reviewed with patient/caregiver?: Yes  Did patient/caregiver verbalize understanding of patient care needs?: Yes  Were patient/caregiver advised of the risks associated with not following Treatment Team discharge recommendations?: Yes       Additional Comments: Pt is currently observation status  PT/OT are recommending home therapy  TC to dtr and explained this  Dtr and pt are not sure if they want VNA and want to think about it before they are willing for blanket referrals made  Will f/u

## 2022-09-23 NOTE — PLAN OF CARE
Problem: PHYSICAL THERAPY ADULT  Goal: Performs mobility at highest level of function for planned discharge setting  See evaluation for individualized goals  Description: Treatment/Interventions: Functional transfer training, LE strengthening/ROM, Elevations, Therapeutic exercise, Endurance training, Patient/family training, Equipment eval/education, Bed mobility, Gait training, Spoke to nursing, OT  Equipment Recommended: Other (Comment) (Recommend use of RW (pt has))       See flowsheet documentation for full assessment, interventions and recommendations  Note: Prognosis: Good  Problem List: Decreased strength, Decreased endurance, Impaired balance, Decreased mobility, Decreased safety awareness  Assessment: Pt is an 80 y o  female admitted to [place] [address] on 9/22/22 with c/o dizziness  PT consulted with eval and treat and activity as tolerated orders  Pt lives [facility] in a [facility], but has supportive family nearby  At baseline, pt is independent with ADLs and ambulation without AD  Upon evaluation, pt was at a supervision level  Pt presents with weakness, impaired balance, impaired endurance, gait dysfunction and decreased safety awareness  The patient's AM-PAC Basic Mobility Inpatient Short Form Raw Score is 20  A Raw score of greater than 16 suggests the patient may benefit from discharge to home  Please also refer to the recommendation of the Physical Therapist for safe discharge planning  PT to follow 3-5x/wk during acute stay to address deficits  Recommend Home PT to maximize safe mobility and function  Barriers to Discharge: None     PT Discharge Recommendation: Home with home health rehabilitation    See flowsheet documentation for full assessment

## 2022-09-23 NOTE — PLAN OF CARE
Problem: OCCUPATIONAL THERAPY ADULT  Goal: Performs self-care activities at highest level of function for planned discharge setting  See evaluation for individualized goals  Description: Treatment Interventions: ADL retraining, Functional transfer training, UE strengthening/ROM, Endurance training, Patient/family training, Equipment evaluation/education, Compensatory technique education, Continued evaluation, Energy conservation, Activityengagement          See flowsheet documentation for full assessment, interventions and recommendations  Note: Limitation: Decreased ADL status, Decreased UE strength, Decreased endurance, Decreased self-care trans, Decreased high-level ADLs, Decreased sensation  Prognosis: Good  Assessment: Pt is a 80 y o  female seen for OT evaluation s/p adm to [facility] on 9/22/2022 w/ Vertigo and HTN  CT head (-)  MRI brain pending  Pt with active OT orders and activity orders for Up with assistance  Pt lives alone in a one level house with 1 FÉLIX  Pt has supportive family who are able to assist as needed  At baseline, pt was I w/ ADLs, IADLs, and functional transfers/mobility w/o use of AD  (+)   (+) fall PTA  Upon evaluation, pt currently requires Supervision for UB ADLs, Min A for LB ADLs, Min A for toileting, and Supervision for functional mobility/transfers 2* the following deficits impacting occupational performance: decreased strength, decreased balance, decreased tolerance and impaired sensation  These impairments, as well at pts fall risk, steps to enter environment, limited home support, difficulty performing ADLS and difficulty performing IADLS  limit pts ability to safely engage in all baseline areas of occupation  Based on the aforementioned OT evaluation, functional performance deficits, and assessments, pt has been identified as a Moderate complexity evaluation   Pt to continue to benefit from continued acute OT services during hospital stay to address defined deficits and to maximize level of functional independence in the following Occupational Performance areas: grooming, bathing/shower, toilet hygiene, dressing, medication management, health maintenance, functional mobility, community mobility, clothing management, cleaning, meal prep and household maintenance  From OT standpoint, recommend Home OT upon D/C   OT will continue to follow pt 2-3x/wk to address the following goals to  w/in 10-14 days:     OT Discharge Recommendation: Home with home health rehabilitation

## 2022-09-23 NOTE — ASSESSMENT & PLAN NOTE
· Patient presented to ED with complaint of room spinning sensation when she got up yesterday    Mri brain showed no stroke  Either the vertigo was from the hypertency urgency or BPPV  Vertigo has resolved      Blood pressure is improving

## 2022-09-24 NOTE — DISCHARGE SUMMARY
[address] [facility]  Discharge- Ainsley Brower 1933, 80 y o  female MRN: [id number]  Unit/Bed#: E4 -01 Encounter: [id number]  Primary Care Provider: No primary care provider on file  Date and time admitted to hospital: 9/22/2022  8:53 AM    * Stroke-like symptoms: vertigo and gait imbalance  Assessment & Plan  · Patient presented to ED with complaint of room spinning sensation when she got up yesterday    Mri brain showed no stroke  Either the vertigo was from the hypertency urgency or BPPV  Vertigo has resolved  Blood pressure is improving    HTN (hypertension)  Assessment & Plan  She had hypertensive urgency on admission  She was not taking her norvasc as directed  She is on metoprolol, HCTZ, Norvasc, but her systolic blood pressure is still running in the 170s  Will send her out on BID norvasc  Daughters will check blood pressures each morning with parameters to call PCP if too high or too low  Mixed hyperlipidemia  Assessment & Plan  · History of hyperlipidemia  · Maintain outpatient on daily statin, continue  · Repeat lipid panel today WNL    Paroxysmal A-fib (HCC)  Assessment & Plan  · History of proximal atrial fibrillation  · Continue anticoagulation with Xarelto  · Resume metoprolol       Discharging Physician / Practitioner: Arabella Gipson DO  PCP: No primary care provider on file  Admission Date:   Admission Orders (From admission, onward)     Ordered        09/22/22 1140  Place in Observation  Once                      Discharge Date: 9/23/2022    Medical Problems             Resolved Problems  Date Reviewed: 9/23/2022   None                   Consultations During Hospital Stay:  · neurology      Procedures Performed:     · MRI brain      Reason for Admission: dizziness      Hospital Course:     Ainsley Brower is a 80 y o  female patient who originally presented to the hospital on 9/22/2022 due to dizziness     She woke up with vertigo symptoms  No head trauma  No associateion with moving her head  She was found to have hypertensive urgency  She was not taking her norvasc as she was directed by mistake  No stroke on MRI brain  Blood pressure is still high on metoprolol, HCTZ and norvasc  I will send her out on BID norvasc    Please see above list of diagnoses and related plan for additional information  Condition at Discharge: stable       Discharge Day Visit / Exam:     Subjective:  Feels better  No dizziness        Vitals: Blood Pressure: (!) 172/79 (09/23/22 1556)  Pulse: 60 (09/23/22 1556)  Temperature: 97 6 °F (36 4 °C) (09/23/22 1556)  Temp Source: Temporal (09/23/22 1556)  Respirations: 18 (09/23/22 1556)  Height: 5' (152 4 cm) (09/23/22 1325)  Weight - Scale: 113 kg (249 lb 1 9 oz) (09/23/22 1325)  SpO2: 96 % (09/23/22 1556)    Exam:     Physical Exam  Vitals and nursing note reviewed  HENT:      Head: Normocephalic and atraumatic  Eyes:      Pupils: Pupils are equal, round, and reactive to light  Cardiovascular:      Rate and Rhythm: Normal rate and regular rhythm  Heart sounds: No murmur heard  No friction rub  No gallop  Pulmonary:      Effort: Pulmonary effort is normal       Breath sounds: Normal breath sounds  No wheezing or rales  Abdominal:      General: Bowel sounds are normal       Palpations: Abdomen is soft  Tenderness: There is no abdominal tenderness  Musculoskeletal:      Right lower leg: No edema  Left lower leg: No edema            Discharge instructions/Information to patient and family:   See after visit summary for information provided to patient and family  Provisions for Follow-Up Care:  See after visit summary for information related to follow-up care and any pertinent home health orders  Disposition:     Home       Discharge Statement:  I spent 39 minutes discharging the patient  This time was spent on the day of discharge   I had direct contact with the patient on the day of discharge  Greater than 50% of the total time was spent examining patient, answering all patient questions, arranging and discussing plan of care with patient as well as directly providing post-discharge instructions  Additional time then spent on discharge activities  Discharge Medications:  See after visit summary for reconciled discharge medications provided to patient and family        ** Please Note: This note has been constructed using a voice recognition system **

## 2022-10-10 ENCOUNTER — OFFICE VISIT (OUTPATIENT)
Dept: FAMILY MEDICINE CLINIC | Facility: CLINIC | Age: 87
End: 2022-10-10
Payer: COMMERCIAL

## 2022-10-10 VITALS
TEMPERATURE: 97.9 F | DIASTOLIC BLOOD PRESSURE: 58 MMHG | WEIGHT: 250.2 LBS | HEIGHT: 60 IN | SYSTOLIC BLOOD PRESSURE: 136 MMHG | OXYGEN SATURATION: 97 % | HEART RATE: 59 BPM | BODY MASS INDEX: 49.12 KG/M2

## 2022-10-10 DIAGNOSIS — N39.41 URGE INCONTINENCE OF URINE: ICD-10-CM

## 2022-10-10 DIAGNOSIS — I15.9 SECONDARY HYPERTENSION: ICD-10-CM

## 2022-10-10 DIAGNOSIS — Z00.00 MEDICARE ANNUAL WELLNESS VISIT, SUBSEQUENT: ICD-10-CM

## 2022-10-10 DIAGNOSIS — I48.0 PAROXYSMAL A-FIB (HCC): ICD-10-CM

## 2022-10-10 DIAGNOSIS — E66.01 MORBID OBESITY (HCC): ICD-10-CM

## 2022-10-10 DIAGNOSIS — I10 PRIMARY HYPERTENSION: Primary | ICD-10-CM

## 2022-10-10 DIAGNOSIS — E78.2 MIXED HYPERLIPIDEMIA: ICD-10-CM

## 2022-10-10 PROBLEM — K63.5 COLON POLYP: Status: ACTIVE | Noted: 2018-04-19

## 2022-10-10 PROBLEM — R73.03 PRE-DIABETES: Status: ACTIVE | Noted: 2022-10-10

## 2022-10-10 PROCEDURE — 99204 OFFICE O/P NEW MOD 45 MIN: CPT | Performed by: FAMILY MEDICINE

## 2022-10-10 PROCEDURE — G0439 PPPS, SUBSEQ VISIT: HCPCS | Performed by: FAMILY MEDICINE

## 2022-10-10 RX ORDER — ERGOCALCIFEROL (VITAMIN D2) 10 MCG
TABLET ORAL
COMMUNITY

## 2022-10-10 RX ORDER — AMLODIPINE BESYLATE 10 MG/1
10 TABLET ORAL DAILY
Qty: 60 TABLET | Refills: 0
Start: 2022-10-10

## 2022-10-10 RX ORDER — UBIDECARENONE 75 MG
CAPSULE ORAL
COMMUNITY

## 2022-10-10 NOTE — PATIENT INSTRUCTIONS
Medicare wellness exam is completed  Looks great  Blood pressure is well controlled with a little bit of ankle swelling and not feeling quite right  Decrease amlodipine to 10 mg once daily  Other medications stay the same  She declines a flu shot  Recheck in 3 months for follow-up of blood pressure  Medicare Preventive Visit Patient Instructions  Thank you for completing your Welcome to Medicare Visit or Medicare Annual Wellness Visit today  Your next wellness visit will be due in one year (10/11/2023)  The screening/preventive services that you may require over the next 5-10 years are detailed below  Some tests may not apply to you based off risk factors and/or age  Screening tests ordered at today's visit but not completed yet may show as past due  Also, please note that scanned in results may not display below  Preventive Screenings:  Service Recommendations Previous Testing/Comments   Colorectal Cancer Screening  * Colonoscopy    * Fecal Occult Blood Test (FOBT)/Fecal Immunochemical Test (FIT)  * Fecal DNA/Cologuard Test  * Flexible Sigmoidoscopy Age: 39-70 years old   Colonoscopy: every 10 years (may be performed more frequently if at higher risk)  OR  FOBT/FIT: every 1 year  OR  Cologuard: every 3 years  OR  Sigmoidoscopy: every 5 years  Screening may be recommended earlier than age 39 if at higher risk for colorectal cancer  Also, an individualized decision between you and your healthcare provider will decide whether screening between the ages of 74-80 would be appropriate  Colonoscopy: Not on file  FOBT/FIT: Not on file  Cologuard: Not on file  Sigmoidoscopy: Not on file          Breast Cancer Screening Age: 36 years old  Frequency: every 1-2 years  Not required if history of left and right mastectomy Mammogram: Not on file        Cervical Cancer Screening Between the ages of 21-29, pap smear recommended once every 3 years     Between the ages of 33-67, can perform pap smear with HPV co-testing every 5 years  Recommendations may differ for women with a history of total hysterectomy, cervical cancer, or abnormal pap smears in past  Pap Smear: Not on file        Hepatitis C Screening Once for adults born between 1945 and 1965  More frequently in patients at high risk for Hepatitis C Hep C Antibody: Not on file        Diabetes Screening 1-2 times per year if you're at risk for diabetes or have pre-diabetes Fasting glucose: No results in last 5 years (No results in last 5 years)  A1C: 6 2 % (9/23/2022)      Cholesterol Screening Once every 5 years if you don't have a lipid disorder  May order more often based on risk factors  Lipid panel: 09/23/2022          Other Preventive Screenings Covered by Medicare:  Abdominal Aortic Aneurysm (AAA) Screening: covered once if your at risk  You're considered to be at risk if you have a family history of AAA  Lung Cancer Screening: covers low dose CT scan once per year if you meet all of the following conditions: (1) Age 50-69; (2) No signs or symptoms of lung cancer; (3) Current smoker or have quit smoking within the last 15 years; (4) You have a tobacco smoking history of at least 20 pack years (packs per day multiplied by number of years you smoked); (5) You get a written order from a healthcare provider  Glaucoma Screening: covered annually if you're considered high risk: (1) You have diabetes OR (2) Family history of glaucoma OR (3)  aged 48 and older OR (3)  American aged 72 and older  Osteoporosis Screening: covered every 2 years if you meet one of the following conditions: (1) You're estrogen deficient and at risk for osteoporosis based off medical history and other findings; (2) Have a vertebral abnormality; (3) On glucocorticoid therapy for more than 3 months; (4) Have primary hyperparathyroidism; (5) On osteoporosis medications and need to assess response to drug therapy     Last bone density test (DXA Scan): Not on file   HIV Screening: covered annually if you're between the age of 15-65  Also covered annually if you are younger than 13 and older than 72 with risk factors for HIV infection  For pregnant patients, it is covered up to 3 times per pregnancy  Immunizations:  Immunization Recommendations   Influenza Vaccine Annual influenza vaccination during flu season is recommended for all persons aged >= 6 months who do not have contraindications   Pneumococcal Vaccine   * Pneumococcal conjugate vaccine = PCV13 (Prevnar 13), PCV15 (Vaxneuvance), PCV20 (Prevnar 20)  * Pneumococcal polysaccharide vaccine = PPSV23 (Pneumovax) Adults 25-60 years old: 1-3 doses may be recommended based on certain risk factors  Adults 72 years old: 1-2 doses may be recommended based off what pneumonia vaccine you previously received   Hepatitis B Vaccine 3 dose series if at intermediate or high risk (ex: diabetes, end stage renal disease, liver disease)   Tetanus (Td) Vaccine - COST NOT COVERED BY MEDICARE PART B Following completion of primary series, a booster dose should be given every 10 years to maintain immunity against tetanus  Td may also be given as tetanus wound prophylaxis  Tdap Vaccine - COST NOT COVERED BY MEDICARE PART B Recommended at least once for all adults  For pregnant patients, recommended with each pregnancy  Shingles Vaccine (Shingrix) - COST NOT COVERED BY MEDICARE PART B  2 shot series recommended in those aged 48 and above     Health Maintenance Due:  There are no preventive care reminders to display for this patient  Immunizations Due:      Topic Date Due    Pneumococcal Vaccine: 65+ Years (1 - PCV) Never done    COVID-19 Vaccine (2 - Booster for Bala series) 06/05/2021    Influenza Vaccine (1) Never done     Advance Directives   What are advance directives? Advance directives are legal documents that state your wishes and plans for medical care   These plans are made ahead of time in case you lose your ability to make decisions for yourself  Advance directives can apply to any medical decision, such as the treatments you want, and if you want to donate organs  What are the types of advance directives? There are many types of advance directives, and each state has rules about how to use them  You may choose a combination of any of the following:  Living will: This is a written record of the treatment you want  You can also choose which treatments you do not want, which to limit, and which to stop at a certain time  This includes surgery, medicine, IV fluid, and tube feedings  Durable power of  for healthcare [facility]): This is a written record that states who you want to make healthcare choices for you when you are unable to make them for yourself  This person, called a proxy, is usually a family member or a friend  You may choose more than 1 proxy  Do not resuscitate (DNR) order:  A DNR order is used in case your heart stops beating or you stop breathing  It is a request not to have certain forms of treatment, such as CPR  A DNR order may be included in other types of advance directives  Medical directive: This covers the care that you want if you are in a coma, near death, or unable to make decisions for yourself  You can list the treatments you want for each condition  Treatment may include pain medicine, surgery, blood transfusions, dialysis, IV or tube feedings, and a ventilator (breathing machine)  Values history: This document has questions about your views, beliefs, and how you feel and think about life  This information can help others choose the care that you would choose  Why are advance directives important? An advance directive helps you control your care  Although spoken wishes may be used, it is better to have your wishes written down  Spoken wishes can be misunderstood, or not followed  Treatments may be given even if you do not want them   An advance directive may make it easier for your family to make difficult choices about your care  Weight Management   Why it is important to manage your weight:  Being overweight increases your risk of health conditions such as heart disease, high blood pressure, type 2 diabetes, and certain types of cancer  It can also increase your risk for osteoarthritis, sleep apnea, and other respiratory problems  Aim for a slow, steady weight loss  Even a small amount of weight loss can lower your risk of health problems  How to lose weight safely:  A safe and healthy way to lose weight is to eat fewer calories and get regular exercise  You can lose up about 1 pound a week by decreasing the number of calories you eat by 500 calories each day  Healthy meal plan for weight management:  A healthy meal plan includes a variety of foods, contains fewer calories, and helps you stay healthy  A healthy meal plan includes the following:  Eat whole-grain foods more often  A healthy meal plan should contain fiber  Fiber is the part of grains, fruits, and vegetables that is not broken down by your body  Whole-grain foods are healthy and provide extra fiber in your diet  Some examples of whole-grain foods are whole-wheat breads and pastas, oatmeal, brown rice, and bulgur  Eat a variety of vegetables every day  Include dark, leafy greens such as spinach, kale, nayla greens, and mustard greens  Eat yellow and orange vegetables such as carrots, sweet potatoes, and winter squash  Eat a variety of fruits every day  Choose fresh or canned fruit (canned in its own juice or light syrup) instead of juice  Fruit juice has very little or no fiber  Eat low-fat dairy foods  Drink fat-free (skim) milk or 1% milk  Eat fat-free yogurt and low-fat cottage cheese  Try low-fat cheeses such as mozzarella and other reduced-fat cheeses  Choose meat and other protein foods that are low in fat  Choose beans or other legumes such as split peas or lentils   Choose fish, skinless poultry (chicken or turkey), or lean cuts of red meat (beef or pork)  Before you cook meat or poultry, cut off any visible fat  Use less fat and oil  Try baking foods instead of frying them  Add less fat, such as margarine, sour cream, regular salad dressing and mayonnaise to foods  Eat fewer high-fat foods  Some examples of high-fat foods include french fries, doughnuts, ice cream, and cakes  Eat fewer sweets  Limit foods and drinks that are high in sugar  This includes candy, cookies, regular soda, and sweetened drinks  Exercise:  Exercise at least 30 minutes per day on most days of the week  Some examples of exercise include walking, biking, dancing, and swimming  You can also fit in more physical activity by taking the stairs instead of the elevator or parking farther away from stores  Ask your healthcare provider about the best exercise plan for you  © Copyright [employer] 2018 Information is for End User's use only and may not be sold, redistributed or otherwise used for commercial purposes   All illustrations and images included in CareNotes® are the copyrighted property of A D A M , Inc  or [address] [place] [address]

## 2022-10-10 NOTE — PROGRESS NOTES
Chief Complaint   Patient presents with   • [facility] f/u and BP's-Had a fall last week-Thursday night-didn't hurt yourself        HPI   [age] year old female presents as a new patient  Past medical history significant for hypertension, hyperlipidemia, and paroxysmal atrial fibrillation  Previously followed by Dr Marguerite Munoz and cardiologist Dr Ismael Driver  Recently hospitalized with an episode of vertigo  MRI was negative  Blood pressure medicine was increased  History is negative for heart disease, stroke, diabetes, and cancer  Allergies to lisinopril which caused angioedema  Oxycodone caused rash in GI intolerance  Nonsmoker  Occasional alcohol  She notes that she knows when she is in atrial fibrillation  Most of the time, she is not  Wears a pad for incontinence  She notes she had been taking just 5 mg of amlodipine before she went into the hospital   Now taking 10 mg twice daily  Not quite lightheaded but does not feel quite right  Does not feel as clear  Other medications include metoprolol 100 mg b i d , hydrochlorothiazide 25 mg daily, simvastatin 20 daily and Xarelto 20 daily  Past Medical History:   Diagnosis Date   • Afib [facility])    • Colon polyp 4/19/2018    Formatting of this note might be different from the original  Colonoscopy 4/19/18   • Hyperlipidemia    • Hypertension    • Pre-diabetes 10/10/2022    A1c about 6 2 since 2018   • Urge incontinence of urine 5/6/2016        Past Surgical History:   Procedure Laterality Date   • HYSTERECTOMY     • KNEE SURGERY Bilateral 2012       Social History     Tobacco Use   • Smoking status: Never Smoker   • Smokeless tobacco: Never Used   Substance Use Topics   • Alcohol use: Never       Social History     Social History Narrative     since 2005 after 47 year marriage  Three children  Six grandchildren  Seven great grandchildren  Lives alone  In her own house which is [place] ranch  Drives  The following portions of the patient's history were reviewed and updated as appropriate: allergies, current medications, past family history, past medical history, past social history, past surgical history and problem list       Review of Systems   Constitutional: Negative for activity change and appetite change  Respiratory: Negative for shortness of breath  Cardiovascular: Negative for chest pain  Psychiatric/Behavioral: Negative for behavioral problems  The patient is not nervous/anxious  /58 (BP Location: Left arm, Patient Position: Sitting, Cuff Size: Large)   Pulse 59   Temp 97 9 °F (36 6 °C) (Temporal)   Ht 5' (1 524 m)   Wt 113 kg (250 lb 3 2 oz)   SpO2 97%   BMI 48 86 kg/m²      Physical Exam   Appears well  Blood pressure 120/60 with a large cuff  Lungs are clear  Heart regular with a 2/6 systolic murmur  Abdomen nontender  Obese  Slight left pretibial edema noted                Current Outpatient Medications:   •  amLODIPine (NORVASC) 10 mg tablet, Take 1 tablet (10 mg total) by mouth daily, Disp: 60 tablet, Rfl: 0  •  cholecalciferol (VITAMIN D3) 400 units tablet, Take 1 tablet by mouth daily, Disp: , Rfl:   •  cyanocobalamin (VITAMIN B-12) 100 mcg tablet, Take by mouth, Disp: , Rfl:   •  ELDERBERRY PO, Take by mouth, Disp: , Rfl:   •  FISH OIL-KRILL OIL PO, Take by mouth, Disp: , Rfl:   •  hydrochlorothiazide (HYDRODIURIL) 25 mg tablet, Take 25 mg by mouth daily, Disp: , Rfl:   •  metoprolol tartrate (LOPRESSOR) 100 mg tablet, Take 1 tablet by mouth 2 (two) times a day, Disp: , Rfl:   •  Multiple Vitamin (Daily Value Multivitamin) TABS, Take by mouth, Disp: , Rfl:   •  rivaroxaban (XARELTO) 20 mg tablet, Take 1 tablet by mouth daily, Disp: , Rfl:   •  simvastatin (ZOCOR) 20 mg tablet, Take 1 tablet by mouth daily, Disp: , Rfl:   •  zinc gluconate 50 mg tablet, Take 50 mg by mouth daily, Disp: , Rfl:      No problem-specific Assessment & Plan notes found for this encounter  Diagnoses and all orders for this visit:    Primary hypertension    Mixed hyperlipidemia    Paroxysmal A-fib (HCC)    Urge incontinence of urine    Morbid obesity ([address] )    Secondary hypertension  -     amLODIPine (NORVASC) 10 mg tablet; Take 1 tablet (10 mg total) by mouth daily    Medicare annual wellness visit, subsequent    Other orders  -     Multiple Vitamin (Daily Value Multivitamin) TABS; Take by mouth  -     cyanocobalamin (VITAMIN B-12) 100 mcg tablet; Take by mouth  -     ELDERBERRY PO; Take by mouth  -     FISH OIL-KRILL OIL PO; Take by mouth        Patient Instructions     Medicare wellness exam is completed  Looks great  Blood pressure is well controlled with a little bit of ankle swelling and not feeling quite right  Decrease amlodipine to 10 mg once daily  Other medications stay the same  She declines a flu shot  Recheck in 3 months for follow-up of blood pressure  Medicare Preventive Visit Patient Instructions  Thank you for completing your Welcome to Medicare Visit or Medicare Annual Wellness Visit today  Your next wellness visit will be due in one year (10/11/2023)  The screening/preventive services that you may require over the next 5-10 years are detailed below  Some tests may not apply to you based off risk factors and/or age  Screening tests ordered at today's visit but not completed yet may show as past due  Also, please note that scanned in results may not display below    Preventive Screenings:  Service Recommendations Previous Testing/Comments   Colorectal Cancer Screening  * Colonoscopy    * Fecal Occult Blood Test (FOBT)/Fecal Immunochemical Test (FIT)  * Fecal DNA/Cologuard Test  * Flexible Sigmoidoscopy Age: 39-70 years old   Colonoscopy: every 10 years (may be performed more frequently if at higher risk)  OR  FOBT/FIT: every 1 year  OR  Cologuard: every 3 years  OR  Sigmoidoscopy: every 5 years  Screening may be recommended earlier than age 39 if at higher risk for colorectal cancer  Also, an individualized decision between you and your healthcare provider will decide whether screening between the ages of 74-80 would be appropriate  Colonoscopy: Not on file  FOBT/FIT: Not on file  Cologuard: Not on file  Sigmoidoscopy: Not on file          Breast Cancer Screening Age: 36 years old  Frequency: every 1-2 years  Not required if history of left and right mastectomy Mammogram: Not on file        Cervical Cancer Screening Between the ages of 21-29, pap smear recommended once every 3 years  Between the ages of 33-67, can perform pap smear with HPV co-testing every 5 years  Recommendations may differ for women with a history of total hysterectomy, cervical cancer, or abnormal pap smears in past  Pap Smear: Not on file        Hepatitis C Screening Once for adults born between 1945 and 1965  More frequently in patients at high risk for Hepatitis C Hep C Antibody: Not on file        Diabetes Screening 1-2 times per year if you're at risk for diabetes or have pre-diabetes Fasting glucose: No results in last 5 years (No results in last 5 years)  A1C: 6 2 % (9/23/2022)      Cholesterol Screening Once every 5 years if you don't have a lipid disorder  May order more often based on risk factors  Lipid panel: 09/23/2022          Other Preventive Screenings Covered by Medicare:  1  Abdominal Aortic Aneurysm (AAA) Screening: covered once if your at risk  You're considered to be at risk if you have a family history of AAA  2  Lung Cancer Screening: covers low dose CT scan once per year if you meet all of the following conditions: (1) Age 50-69; (2) No signs or symptoms of lung cancer; (3) Current smoker or have quit smoking within the last 15 years; (4) You have a tobacco smoking history of at least 20 pack years (packs per day multiplied by number of years you smoked); (5) You get a written order from a healthcare provider    3  Glaucoma Screening: covered annually if you're considered high risk: (1) You have diabetes OR (2) Family history of glaucoma OR (3)  aged 48 and older OR (4)  American aged 72 and older  3  Osteoporosis Screening: covered every 2 years if you meet one of the following conditions: (1) You're estrogen deficient and at risk for osteoporosis based off medical history and other findings; (2) Have a vertebral abnormality; (3) On glucocorticoid therapy for more than 3 months; (4) Have primary hyperparathyroidism; (5) On osteoporosis medications and need to assess response to drug therapy  · Last bone density test (DXA Scan): Not on file  5  HIV Screening: covered annually if you're between the age of 12-76  Also covered annually if you are younger than 13 and older than 72 with risk factors for HIV infection  For pregnant patients, it is covered up to 3 times per pregnancy  Immunizations:  Immunization Recommendations   Influenza Vaccine Annual influenza vaccination during flu season is recommended for all persons aged >= 6 months who do not have contraindications   Pneumococcal Vaccine   * Pneumococcal conjugate vaccine = PCV13 (Prevnar 13), PCV15 (Vaxneuvance), PCV20 (Prevnar 20)  * Pneumococcal polysaccharide vaccine = PPSV23 (Pneumovax) Adults 25-60 years old: 1-3 doses may be recommended based on certain risk factors  Adults 72 years old: 1-2 doses may be recommended based off what pneumonia vaccine you previously received   Hepatitis B Vaccine 3 dose series if at intermediate or high risk (ex: diabetes, end stage renal disease, liver disease)   Tetanus (Td) Vaccine - COST NOT COVERED BY MEDICARE PART B Following completion of primary series, a booster dose should be given every 10 years to maintain immunity against tetanus  Td may also be given as tetanus wound prophylaxis  Tdap Vaccine - COST NOT COVERED BY MEDICARE PART B Recommended at least once for all adults  For pregnant patients, recommended with each pregnancy  Shingles Vaccine (Shingrix) - COST NOT COVERED BY MEDICARE PART B  2 shot series recommended in those aged 48 and above     Health Maintenance Due:  There are no preventive care reminders to display for this patient  Immunizations Due:      Topic Date Due   • Pneumococcal Vaccine: 65+ Years (1 - PCV) Never done   • COVID-19 Vaccine (2 - Booster for Bala series) 06/05/2021   • Influenza Vaccine (1) Never done     Advance Directives   What are advance directives? Advance directives are legal documents that state your wishes and plans for medical care  These plans are made ahead of time in case you lose your ability to make decisions for yourself  Advance directives can apply to any medical decision, such as the treatments you want, and if you want to donate organs  What are the types of advance directives? There are many types of advance directives, and each state has rules about how to use them  You may choose a combination of any of the following:  · Living will: This is a written record of the treatment you want  You can also choose which treatments you do not want, which to limit, and which to stop at a certain time  This includes surgery, medicine, IV fluid, and tube feedings  · Durable power of  for healthcare [facility]): This is a written record that states who you want to make healthcare choices for you when you are unable to make them for yourself  This person, called a proxy, is usually a family member or a friend  You may choose more than 1 proxy  · Do not resuscitate (DNR) order:  A DNR order is used in case your heart stops beating or you stop breathing  It is a request not to have certain forms of treatment, such as CPR  A DNR order may be included in other types of advance directives  · Medical directive: This covers the care that you want if you are in a coma, near death, or unable to make decisions for yourself  You can list the treatments you want for each condition   Treatment may include pain medicine, surgery, blood transfusions, dialysis, IV or tube feedings, and a ventilator (breathing machine)  · Values history: This document has questions about your views, beliefs, and how you feel and think about life  This information can help others choose the care that you would choose  Why are advance directives important? An advance directive helps you control your care  Although spoken wishes may be used, it is better to have your wishes written down  Spoken wishes can be misunderstood, or not followed  Treatments may be given even if you do not want them  An advance directive may make it easier for your family to make difficult choices about your care  Weight Management   Why it is important to manage your weight:  Being overweight increases your risk of health conditions such as heart disease, high blood pressure, type 2 diabetes, and certain types of cancer  It can also increase your risk for osteoarthritis, sleep apnea, and other respiratory problems  Aim for a slow, steady weight loss  Even a small amount of weight loss can lower your risk of health problems  How to lose weight safely:  A safe and healthy way to lose weight is to eat fewer calories and get regular exercise  You can lose up about 1 pound a week by decreasing the number of calories you eat by 500 calories each day  Healthy meal plan for weight management:  A healthy meal plan includes a variety of foods, contains fewer calories, and helps you stay healthy  A healthy meal plan includes the following:  · Eat whole-grain foods more often  A healthy meal plan should contain fiber  Fiber is the part of grains, fruits, and vegetables that is not broken down by your body  Whole-grain foods are healthy and provide extra fiber in your diet  Some examples of whole-grain foods are whole-wheat breads and pastas, oatmeal, brown rice, and bulgur  · Eat a variety of vegetables every day    Include dark, leafy greens such as spinach, kale, nayla greens, and mustard greens  Eat yellow and orange vegetables such as carrots, sweet potatoes, and winter squash  · Eat a variety of fruits every day  Choose fresh or canned fruit (canned in its own juice or light syrup) instead of juice  Fruit juice has very little or no fiber  · Eat low-fat dairy foods  Drink fat-free (skim) milk or 1% milk  Eat fat-free yogurt and low-fat cottage cheese  Try low-fat cheeses such as mozzarella and other reduced-fat cheeses  · Choose meat and other protein foods that are low in fat  Choose beans or other legumes such as split peas or lentils  Choose fish, skinless poultry (chicken or turkey), or lean cuts of red meat (beef or pork)  Before you cook meat or poultry, cut off any visible fat  · Use less fat and oil  Try baking foods instead of frying them  Add less fat, such as margarine, sour cream, regular salad dressing and mayonnaise to foods  Eat fewer high-fat foods  Some examples of high-fat foods include french fries, doughnuts, ice cream, and cakes  · Eat fewer sweets  Limit foods and drinks that are high in sugar  This includes candy, cookies, regular soda, and sweetened drinks  Exercise:  Exercise at least 30 minutes per day on most days of the week  Some examples of exercise include walking, biking, dancing, and swimming  You can also fit in more physical activity by taking the stairs instead of the elevator or parking farther away from stores  Ask your healthcare provider about the best exercise plan for you  © Copyright [employer] 2018 Information is for End User's use only and may not be sold, redistributed or otherwise used for commercial purposes   All illustrations and images included in CareNotes® are the copyrighted property of [employer]  or [address] [place] [address] [facility]

## 2022-10-10 NOTE — PROGRESS NOTES
Assessment and Plan:     Problem List Items Addressed This Visit        Cardiovascular and Mediastinum    HTN (hypertension) - Primary    Relevant Medications    amLODIPine (NORVASC) 10 mg tablet    Paroxysmal A-fib (HCC)    Relevant Medications    amLODIPine (NORVASC) 10 mg tablet       Other    Mixed hyperlipidemia    Morbid obesity (HCC)    Urge incontinence of urine      Other Visit Diagnoses     Medicare annual wellness visit, subsequent               Preventive health issues were discussed with patient, and age appropriate screening tests were ordered as noted in patient's After Visit Summary  Personalized health advice and appropriate referrals for health education or preventive services given if needed, as noted in patient's After Visit Summary  History of Present Illness:     Patient presents for a Medicare Wellness Visit    HPI   Patient Care Team:  MD Sammi Sandhu MD     Review of Systems:     Review of Systems     Problem List:     Patient Active Problem List   Diagnosis   • Stroke-like symptoms: vertigo and gait imbalance   • HTN (hypertension)   • Paroxysmal A-fib ([address] )   • Mixed hyperlipidemia   • Colon polyp   • Morbid obesity ([address] )   • Urge incontinence of urine   • Pre-diabetes      Past Medical and Surgical History:     Past Medical History:   Diagnosis Date   • Afib ([address] )    • Colon polyp 4/19/2018    Formatting of this note might be different from the original  Colonoscopy 4/19/18   • Hyperlipidemia    • Hypertension    • Pre-diabetes 10/10/2022    A1c about 6 2 since 2018   • Urge incontinence of urine 5/6/2016     Past Surgical History:   Procedure Laterality Date   • HYSTERECTOMY     • KNEE SURGERY Bilateral 2012      Family History:     History reviewed  No pertinent family history     Social History:     Social History     Socioeconomic History   • Marital status: Unknown     Spouse name: None   • Number of children: None   • Years of education: None   • Highest education level: None   Occupational History   • None   Tobacco Use   • Smoking status: Never Smoker   • Smokeless tobacco: Never Used   Vaping Use   • Vaping Use: Never used   Substance and Sexual Activity   • Alcohol use: Never   • Drug use: Never   • Sexual activity: None   Other Topics Concern   • None   Social History Narrative     since 2005 after 47 year marriage  Three children  Six grandchildren  Seven great grandchildren  Lives alone  In her own house which is a ranch  Drives  Social Determinants of Health     Financial Resource Strain: Not on file   Food Insecurity: Not on file   Transportation Needs: Not on file   Physical Activity: Not on file   Stress: Not on file   Social Connections: Not on file   Intimate Partner Violence: Not on file   Housing Stability: Not on file      Medications and Allergies:     Current Outpatient Medications   Medication Sig Dispense Refill   • amLODIPine (NORVASC) 10 mg tablet Take 1 tablet (10 mg total) by mouth daily 60 tablet 0   • cholecalciferol (VITAMIN D3) 400 units tablet Take 1 tablet by mouth daily     • cyanocobalamin (VITAMIN B-12) 100 mcg tablet Take by mouth     • ELDERBERRY PO Take by mouth     • FISH OIL-KRILL OIL PO Take by mouth     • hydrochlorothiazide (HYDRODIURIL) 25 mg tablet Take 25 mg by mouth daily     • metoprolol tartrate (LOPRESSOR) 100 mg tablet Take 1 tablet by mouth 2 (two) times a day     • Multiple Vitamin (Daily Value Multivitamin) TABS Take by mouth     • rivaroxaban (XARELTO) 20 mg tablet Take 1 tablet by mouth daily     • simvastatin (ZOCOR) 20 mg tablet Take 1 tablet by mouth daily     • zinc gluconate 50 mg tablet Take 50 mg by mouth daily       No current facility-administered medications for this visit       Allergies   Allergen Reactions   • Lisinopril Hives     Other reaction(s): Angioedema   • Oxycodone-Acetaminophen Rash and GI Intolerance      Immunizations:     Immunization History   Administered Date(s) Administered   • COVID-19 J&J (Bala) vaccine 0 5 mL 04/10/2021      Health Maintenance: There are no preventive care reminders to display for this patient  Topic Date Due   • Pneumococcal Vaccine: 65+ Years (1 - PCV) Never done   • COVID-19 Vaccine (2 - Booster for Bala series) 06/05/2021   • Influenza Vaccine (1) Never done      Medicare Screening Tests and Risk Assessments:     Landon Akers is here for her Subsequent Wellness visit  Health Risk Assessment:   Patient rates overall health as very good  Patient feels that their physical health rating is same  Patient is satisfied with their life  Eyesight was rated as same  Hearing was rated as same  Patient feels that their emotional and mental health rating is same  Patients states they are never, rarely angry  Patient states they are never, rarely unusually tired/fatigued  Pain experienced in the last 7 days has been none  Patient states that she has experienced no weight loss or gain in last 6 months  Depression Screening:   PHQ-2 Score: 0      Fall Risk Screening: In the past year, patient has experienced: history of falling in past year    Number of falls: 2 or more  Injured during fall?: No    Feels unsteady when standing or walking?: Yes    Worried about falling?: Yes      Urinary Incontinence Screening:   Patient has leaked urine accidently in the last six months  Home Safety:  Patient does not have trouble with stairs inside or outside of their home  Patient has working smoke alarms and has working carbon monoxide detector  Home safety hazards include: none  Nutrition:   Current diet is Regular and No Added Salt  Medications:   Patient is currently taking over-the-counter supplements  OTC medications include: see medication list  Patient is able to manage medications       Activities of Daily Living (ADLs)/Instrumental Activities of Daily Living (IADLs):   Walk and transfer into and out of bed and chair?: Yes  Dress and groom yourself?: Yes    Bathe or shower yourself?: Yes    Feed yourself? Yes  Do your laundry/housekeeping?: Yes  Manage your money, pay your bills and track your expenses?: No  Make your own meals?: Yes    Do your own shopping?: Yes    Previous Hospitalizations:   Any hospitalizations or ED visits within the last 12 months?: Yes    How many hospitalizations have you had in the last year?: 1-2    Advance Care Planning:   Living will: No    Durable POA for healthcare: Yes    Advanced directive: No      PREVENTIVE SCREENINGS      Cardiovascular Screening:    General: Screening Not Indicated and History Lipid Disorder      Diabetes Screening:     General: Screening Current      Colorectal Cancer Screening:     General: Screening Not Indicated      Cervical Cancer Screening:    General: Screening Not Indicated      Lung Cancer Screening:     General: Screening Not Indicated    Screening, Brief Intervention, and Referral to Treatment (SBIRT)    Screening  Typical number of drinks in a day: 0  Typical number of drinks in a week: 0  Interpretation: Low risk drinking behavior      Single Item Drug Screening:  How often have you used an illegal drug (including marijuana) or a prescription medication for non-medical reasons in the past year? never    Single Item Drug Screen Score: 0  Interpretation: Negative screen for possible drug use disorder    No exam data present     Physical Exam:     /58 (BP Location: Left arm, Patient Position: Sitting, Cuff Size: Large)   Pulse 59   Temp 97 9 °F (36 6 °C) (Temporal)   Ht 5' (1 524 m)   Wt 113 kg (250 lb 3 2 oz)   SpO2 97%   BMI 48 86 kg/m²     Physical Exam     Sruthi Rodgers MD

## 2022-10-14 ENCOUNTER — OFFICE VISIT (OUTPATIENT)
Dept: URGENT CARE | Facility: MEDICAL CENTER | Age: 87
End: 2022-10-14
Payer: COMMERCIAL

## 2022-10-14 VITALS
OXYGEN SATURATION: 95 % | TEMPERATURE: 100.3 F | RESPIRATION RATE: 18 BRPM | SYSTOLIC BLOOD PRESSURE: 156 MMHG | HEART RATE: 65 BPM | DIASTOLIC BLOOD PRESSURE: 88 MMHG

## 2022-10-14 DIAGNOSIS — U07.1 COVID: Primary | ICD-10-CM

## 2022-10-14 PROCEDURE — 99213 OFFICE O/P EST LOW 20 MIN: CPT | Performed by: EMERGENCY MEDICINE

## 2022-10-14 PROCEDURE — S9083 URGENT CARE CENTER GLOBAL: HCPCS | Performed by: EMERGENCY MEDICINE

## 2022-10-14 NOTE — PROGRESS NOTES
[address] [facility]        NAME: Salvador Manzano is a 80 y o  female  : 1933    MRN: [id number]  DATE: 2022  TIME: 10:40 AM    Assessment and Plan   COVID [U07 1]  1  COVID       10:40 AM  Patient is non-toxic appearing  Vital signs are normal   We discussed the risks and benefits of Paxlovid given the patient's age and associated comorbidities  She is on day 4 of symptoms and is already starting to feel better  She has normal vital signs  She is concerned because she has a family wedding next weekend  Education provided about the risk of possible rebound symptoms with Paxlovid  Patient declined  Patient advised to quarantine for 5 days and wear a mask for another 5 days and while symptomatic  Wash hands frequently  F/u with PCP as outpatient  Reasons to present to the ED were reviewed  Patient Instructions   COVID-19 (Coronavirus Disease 2019)   WHAT YOU NEED TO KNOW:   COVID-19 is the disease caused by a coronavirus first discovered in 2019  Coronaviruses generally cause upper respiratory (nose, throat, and lung) infections, such as a cold  The 2019 virus spreads quickly and easily  It can be spread starting 2 to 3 days before symptoms even begin  DISCHARGE INSTRUCTIONS:   Call your local emergency number (911 in the [facility]) if:   · You have trouble breathing or shortness of breath at rest     · You have chest pain or pressure that lasts longer than 5 minutes  · You become confused or hard to wake  · Your lips or face are blue  Return to the emergency department if:   · You have a fever of 104°F (40°C) or higher  Call your doctor if:   · You have symptoms of COVID-19  · You have questions or concerns about your condition or care  Medicines: You may need any of the followin  Decongestants  help reduce nasal congestion and help you breathe more easily   If you take decongestant pills, they may make you feel restless or cause problems with your sleep  Do not use decongestant sprays for more than a few days  2  Cough suppressants  help reduce coughing  Ask your healthcare provider which type of cough medicine is best for you  3  Acetaminophen  decreases pain and fever  It is available without a doctor's order  Ask how much to take and how often to take it  Follow directions  Read the labels of all other medicines you are using to see if they also contain acetaminophen, or ask your doctor or pharmacist  Acetaminophen can cause liver damage if not taken correctly  Do not use more than 4 grams (4,000 milligrams) total of acetaminophen in one day  4  NSAIDs , such as ibuprofen, help decrease swelling, pain, and fever  This medicine is available with or without a doctor's order  NSAIDs can cause stomach bleeding or kidney problems in certain people  If you take blood thinner medicine, always ask your healthcare provider if NSAIDs are safe for you  Always read the medicine label and follow directions  5  Blood thinners  help prevent blood clots  Clots can cause strokes, heart attacks, and death  The following are general safety guidelines to follow while you are taking a blood thinner:    1  Watch for bleeding and bruising while you take blood thinners  Watch for bleeding from your gums or nose  Watch for blood in your urine and bowel movements  Use a soft washcloth on your skin, and a soft toothbrush to brush your teeth  This can keep your skin and gums from bleeding  If you shave, use an electric shaver  Do not play contact sports  2  Tell your dentist and other healthcare providers that you take a blood thinner  Wear a bracelet or necklace that says you take this medicine  3  Do not start or stop any other medicines unless your healthcare provider tells you to  Many medicines cannot be used with blood thinners  4  Take your blood thinner exactly as prescribed by your healthcare provider   Do not skip does or take less than prescribed  Tell your provider right away if you forget to take your blood thinner, or if you take too much  5  Warfarin  is a blood thinner that you may need to take  The following are things you should be aware of if you take warfarin:     § Foods and medicines can affect the amount of warfarin in your blood  Do not make major changes to your diet while you take warfarin  Warfarin works best when you eat about the same amount of vitamin K every day  Vitamin K is found in green leafy vegetables and certain other foods  Ask for more information about what to eat when you are taking warfarin  § You will need to see your healthcare provider for follow-up visits when you are on warfarin  You will need regular blood tests  These tests are used to decide how much medicine you need  6  Take your medicine as directed  Contact your healthcare provider if you think your medicine is not helping or if you have side effects  Tell him or her if you are allergic to any medicine  Keep a list of the medicines, vitamins, and herbs you take  Include the amounts, and when and why you take them  Bring the list or the pill bottles to follow-up visits  Carry your medicine list with you in case of an emergency  What you need to know about variants: The virus has changed into several new forms (called variants) since it was discovered  The variants may be more contagious (easily spread) than the original form  Some may also cause more severe illness than others  What you need to know about COVID-19 vaccines:  Healthcare providers recommend a COVID-19 vaccine, even if you have already had COVID-19  You are considered fully vaccinated against COVID-19 two weeks after the final dose of any COVID-19 vaccine  Let your healthcare provider know when you have received the final dose of the vaccine  Make a copy of your vaccination card  Keep the original with you in case you need to show it   Keep the copy in a safe place   1  COVID-19 vaccines are given as a shot in 1 or 2 doses  Vaccination is recommended for everyone 5 years or older  1  One 2-dose vaccine is fully approved  for those 16 years or older  This vaccine also has an emergency use authorization (EUA) for children 11to 13years old  No vaccine is currently available for children younger than 5 years  2  A booster (additional) dose  is given to help the immune system continue to protect against severe COVID-19     1  A booster is recommended for all adults 18 or older  The booster can be a different brand of the COVID-19 vaccine than you originally received  The timing for the booster depends on the type of vaccine you received:    § 1-dose vaccine: The booster is given at least 2 months after you received the vaccine  § 2-dose vaccine: The booster is given at least 5 or 6 months after the second dose  2  A booster can be given to adolescents 15to 16years old  Only 1 COVID-19 vaccine has this EUA  The booster is given at least 5 months after the second dose of the original vaccine series  3  A booster is recommended for immunocompromised children 11to 6years old  Only 1 COVID-19 vaccine has this EUA  The booster is given 28 days after the second dose  Continue social distancing and other measures, even after you get the vaccine  Although it is not common, you can become infected after you get the vaccine  You may also be able to pass the virus to others without knowing you are infected  After you get the vaccine, check local, national, and international travel rules  You may need to be tested before you travel  Some countries require proof of a negative test before you travel  You may also need to quarantine after you return  Medicine may be given to prevent infection  The medicine can be given if you are at high risk for infection and cannot get the vaccine   It can also be given if your immune system does not respond well to the vaccine  How the 2019 coronavirus spreads:   · Droplets are the main way all coronaviruses spread  The virus travels in droplets that form when a person talks, sings, coughs, or sneezes  The droplets can also float in the air for minutes or hours  Infection happens when you breathe in the droplets or get them in your eyes or nose  Close personal contact with an infected person increases your risk for infection  This means being within 6 feet (2 meters) of the person for at least 15 minutes over 24 hours  · Person-to-person contact can spread the virus  For example, a person with the virus on his or her hands can spread it by shaking hands with someone  · The virus can stay on objects and surfaces for up to 3 days  You may become infected by touching the object or surface and then touching your eyes or mouth  Help lower the risk for COVID-19:   · Wash your hands often throughout the day  Use soap and water  Rub your soapy hands together, lacing your fingers, for at least 20 seconds  Rinse with warm, running water  Dry your hands with a clean towel or paper towel  Use hand  that contains alcohol if soap and water are not available  Teach children how to wash their hands and use hand   · Cover sneezes and coughs  Turn your face away and cover your mouth and nose with a tissue  Throw the tissue away  Use the bend of your arm if a tissue is not available  Then wash your hands well with soap and water or use hand   Teach children how to cover a cough or sneeze  · Wear a face covering (mask) when needed  Use a cloth covering with at least 2 layers  You can also create layers by putting a cloth covering over a disposable non-medical mask  Cover your mouth and your nose  · Follow worldwide, national, and local social distancing guidelines  Keep at least 6 feet (2 meters) between you and others  · Try not to touch your face    If you get the virus on your hands, you can transfer it to your eyes, nose, or mouth and become infected  You can also transfer it to objects, surfaces, or people  · Clean and disinfect high-touch surfaces and objects often  Use disinfecting wipes, or make a solution of 4 teaspoons of bleach in 1 quart (4 cups) of water  · Ask about other vaccines you may need  Get the influenza (flu) vaccine as soon as recommended each year, usually starting in September or October  Get the pneumonia vaccine if recommended  Your healthcare provider can tell you if you should also get other vaccines, and when to get them  Follow social distancing guidelines:  National and local social distancing rules vary  Rules and restrictions may change over time as restrictions are lifted  The following are general guidelines:  · Stay home if you are sick or think you may have COVID-19  It is important to stay home if you are waiting for a testing appointment or for test results  · Avoid close physical contact with anyone who does not live in your home  Do not shake hands with, hug, or kiss a person as a greeting  If you must use public transportation (such as a bus or subway), try to sit or stand away from others  Wear your face covering  · Avoid in-person gatherings and crowds  Attend virtually if possible  Follow up with your doctor as directed:  Write down your questions so you remember to ask them during your visits  For more information:   · Centers for Disease Control and Prevention  [address] , [address]  Phone: 2- 929 - [phone]  Web Address: [employer] com     © Copyright [employer] 2022 Information is for End User's use only and may not be sold, redistributed or otherwise used for commercial purposes  All illustrations and images included in CareNotes® are the copyrighted property of A D A M [employer] Inc  or Yaquelin Nuñez  The above information is an  only   It is not intended as medical advice for individual conditions or treatments  Talk to your doctor, nurse or pharmacist before following any medical regimen to see if it is safe and effective for you  Follow up with PCP in 3-5 days  Proceed to  ER if symptoms worsen  Chief Complaint     Chief Complaint   Patient presents with   • COVID-19     Patient reports that she tested positive for Covid this morning  She c/o cough and SOB x3 days           History of Present Illness       80year old female presents today for evaluation of a cough which started 4 days ago  She tested positive for COVID-19 this morning  She states she feels better today than she did yesterday but wants someone to listen to her lungs to [facility] sure she does not have bronchitis”  She and her daughter also have multiple questions about recent medication changes including her Norvasc  She has had her dose increased to 10 mg b i d  during a recent hospitalization on 09/22  She saw her primary care physician 4 days ago and he decreased the dose from 10 mg b i d  to 10 mg daily  She states she has been experiencing some leg swelling and wonders whether the Norvasc could be the cause  Review of Systems   Review of Systems   Constitutional: Negative for chills, fatigue and fever  HENT: Positive for congestion  Negative for postnasal drip, sore throat and trouble swallowing  Respiratory: Positive for cough  Negative for chest tightness and shortness of breath  Cardiovascular: Positive for leg swelling  Gastrointestinal: Negative for abdominal pain  Genitourinary: Negative for dysuria  Skin: Negative for rash  Allergic/Immunologic: Negative for immunocompromised state  Neurological: Negative for dizziness, light-headedness and headaches           Current Medications       Current Outpatient Medications:   •  amLODIPine (NORVASC) 10 mg tablet, Take 1 tablet (10 mg total) by mouth daily, Disp: 60 tablet, Rfl: 0  •  cholecalciferol (VITAMIN D3) 400 units tablet, Take 1 tablet by mouth daily, Disp: , Rfl:   •  cyanocobalamin (VITAMIN B-12) 100 mcg tablet, Take by mouth, Disp: , Rfl:   •  ELDERBERRY PO, Take by mouth, Disp: , Rfl:   •  FISH OIL-KRILL OIL PO, Take by mouth, Disp: , Rfl:   •  hydrochlorothiazide (HYDRODIURIL) 25 mg tablet, Take 25 mg by mouth daily, Disp: , Rfl:   •  metoprolol tartrate (LOPRESSOR) 100 mg tablet, Take 1 tablet by mouth 2 (two) times a day, Disp: , Rfl:   •  Multiple Vitamin (Daily Value Multivitamin) TABS, Take by mouth, Disp: , Rfl:   •  rivaroxaban (XARELTO) 20 mg tablet, Take 1 tablet by mouth daily, Disp: , Rfl:   •  simvastatin (ZOCOR) 20 mg tablet, Take 1 tablet by mouth daily, Disp: , Rfl:   •  zinc gluconate 50 mg tablet, Take 50 mg by mouth daily, Disp: , Rfl:     Current Allergies     Allergies as of 10/14/2022 - Reviewed 10/14/2022   Allergen Reaction Noted   • Lisinopril Hives 09/22/2022   • Oxycodone-acetaminophen Rash and GI Intolerance 04/02/2015            The following portions of the patient's history were reviewed and updated as appropriate: allergies, current medications, past family history, past medical history, past social history, past surgical history and problem list      Past Medical History:   Diagnosis Date   • Afib ([facility] [address] )    • Colon polyp 4/19/2018    Formatting of this note might be different from the original  Colonoscopy 4/19/18   • Hyperlipidemia    • Hypertension    • Pre-diabetes 10/10/2022    A1c about 6 2 since 2018   • Urge incontinence of urine 5/6/2016       Past Surgical History:   Procedure Laterality Date   • HYSTERECTOMY     • KNEE SURGERY Bilateral 2012       No family history on file  Medications have been verified  Objective   /88   Pulse 65   Temp 100 3 °F (37 9 °C)   Resp 18   SpO2 95%        Physical Exam     Physical Exam  Vitals and nursing note reviewed  Constitutional:       Appearance: Normal appearance  HENT:      Head: Normocephalic and atraumatic  Right Ear: Tympanic membrane, ear canal and external ear normal       Left Ear: Tympanic membrane, ear canal and external ear normal       Nose: Nose normal       Mouth/Throat:      Mouth: Mucous membranes are moist    Eyes:      Extraocular Movements: Extraocular movements intact  Pupils: Pupils are equal, round, and reactive to light  Cardiovascular:      Rate and Rhythm: Normal rate and regular rhythm  Pulmonary:      Effort: Pulmonary effort is normal  No respiratory distress  Breath sounds: Normal breath sounds  No wheezing or rhonchi  Musculoskeletal:      Comments: Trace bilateral nonpitting edema lower extremities  Skin:     General: Skin is warm and dry  Capillary Refill: Capillary refill takes less than 2 seconds  Neurological:      General: No focal deficit present  Mental Status: She is alert and oriented to person, place, and time     Psychiatric:         Mood and Affect: Mood normal          Behavior: Behavior normal

## 2022-10-14 NOTE — PATIENT INSTRUCTIONS
COVID-19 (Coronavirus Disease 2019)   WHAT YOU NEED TO KNOW:   COVID-19 is the disease caused by a coronavirus first discovered in December 2019  Coronaviruses generally cause upper respiratory (nose, throat, and lung) infections, such as a cold  The 2019 virus spreads quickly and easily  It can be spread starting 2 to 3 days before symptoms even begin  DISCHARGE INSTRUCTIONS:   Call your local emergency number (911 in the [address] [facility]) if:   You have trouble breathing or shortness of breath at rest     You have chest pain or pressure that lasts longer than 5 minutes  You become confused or hard to wake  Your lips or face are blue  Return to the emergency department if:   You have a fever of 104°F (40°C) or higher  Call your doctor if:   You have symptoms of COVID-19  You have questions or concerns about your condition or care  Medicines: You may need any of the following:  Decongestants  help reduce nasal congestion and help you breathe more easily  If you take decongestant pills, they may make you feel restless or cause problems with your sleep  Do not use decongestant sprays for more than a few days  Cough suppressants  help reduce coughing  Ask your healthcare provider which type of cough medicine is best for you  Acetaminophen  decreases pain and fever  It is available without a doctor's order  Ask how much to take and how often to take it  Follow directions  Read the labels of all other medicines you are using to see if they also contain acetaminophen, or ask your doctor or pharmacist  Acetaminophen can cause liver damage if not taken correctly  Do not use more than 4 grams (4,000 milligrams) total of acetaminophen in one day  NSAIDs , such as ibuprofen, help decrease swelling, pain, and fever  This medicine is available with or without a doctor's order  NSAIDs can cause stomach bleeding or kidney problems in certain people   If you take blood thinner medicine, always ask your healthcare provider if NSAIDs are safe for you  Always read the medicine label and follow directions  Blood thinners  help prevent blood clots  Clots can cause strokes, heart attacks, and death  The following are general safety guidelines to follow while you are taking a blood thinner:    Watch for bleeding and bruising while you take blood thinners  Watch for bleeding from your gums or nose  Watch for blood in your urine and bowel movements  Use a soft washcloth on your skin, and a soft toothbrush to brush your teeth  This can keep your skin and gums from bleeding  If you shave, use an electric shaver  Do not play contact sports  Tell your dentist and other healthcare providers that you take a blood thinner  Wear a bracelet or necklace that says you take this medicine  Do not start or stop any other medicines unless your healthcare provider tells you to  Many medicines cannot be used with blood thinners  Take your blood thinner exactly as prescribed by your healthcare provider  Do not skip does or take less than prescribed  Tell your provider right away if you forget to take your blood thinner, or if you take too much  Warfarin  is a blood thinner that you may need to take  The following are things you should be aware of if you take warfarin:     Foods and medicines can affect the amount of warfarin in your blood  Do not make major changes to your diet while you take warfarin  Warfarin works best when you eat about the same amount of vitamin K every day  Vitamin K is found in green leafy vegetables and certain other foods  Ask for more information about what to eat when you are taking warfarin  You will need to see your healthcare provider for follow-up visits when you are on warfarin  You will need regular blood tests  These tests are used to decide how much medicine you need  Take your medicine as directed    Contact your healthcare provider if you think your medicine is not helping or if you have side effects  Tell him or her if you are allergic to any medicine  Keep a list of the medicines, vitamins, and herbs you take  Include the amounts, and when and why you take them  Bring the list or the pill bottles to follow-up visits  Carry your medicine list with you in case of an emergency  What you need to know about variants: The virus has changed into several new forms (called variants) since it was discovered  The variants may be more contagious (easily spread) than the original form  Some may also cause more severe illness than others  What you need to know about COVID-19 vaccines:  Healthcare providers recommend a COVID-19 vaccine, even if you have already had COVID-19  You are considered fully vaccinated against COVID-19 two weeks after the final dose of any COVID-19 vaccine  Let your healthcare provider know when you have received the final dose of the vaccine  Make a copy of your vaccination card  Keep the original with you in case you need to show it  Keep the copy in a safe place  COVID-19 vaccines are given as a shot in 1 or 2 doses  Vaccination is recommended for everyone 5 years or older  One 2-dose vaccine is fully approved  for those 16 years or older  This vaccine also has an emergency use authorization (EUA) for children 11to 13years old  No vaccine is currently available for children younger than 5 years  A booster (additional) dose  is given to help the immune system continue to protect against severe COVID-19  A booster is recommended for all adults 18 or older  The booster can be a different brand of the COVID-19 vaccine than you originally received  The timing for the booster depends on the type of vaccine you received:    1-dose vaccine: The booster is given at least 2 months after you received the vaccine  2-dose vaccine: The booster is given at least 5 or 6 months after the second dose  A booster can be given to adolescents 15to 16years old    Only 1 COVID-19 vaccine has this EUA  The booster is given at least 5 months after the second dose of the original vaccine series  A booster is recommended for immunocompromised children 11to 6years old  Only 1 COVID-19 vaccine has this EUA  The booster is given 28 days after the second dose  Continue social distancing and other measures, even after you get the vaccine  Although it is not common, you can become infected after you get the vaccine  You may also be able to pass the virus to others without knowing you are infected  After you get the vaccine, check local, national, and international travel rules  You may need to be tested before you travel  Some countries require proof of a negative test before you travel  You may also need to quarantine after you return  Medicine may be given to prevent infection  The medicine can be given if you are at high risk for infection and cannot get the vaccine  It can also be given if your immune system does not respond well to the vaccine  How the 2019 coronavirus spreads:   Droplets are the main way all coronaviruses spread  The virus travels in droplets that form when a person talks, sings, coughs, or sneezes  The droplets can also float in the air for minutes or hours  Infection happens when you breathe in the droplets or get them in your eyes or nose  Close personal contact with an infected person increases your risk for infection  This means being within 6 feet (2 meters) of the person for at least 15 minutes over 24 hours  Person-to-person contact can spread the virus  For example, a person with the virus on his or her hands can spread it by shaking hands with someone  The virus can stay on objects and surfaces for up to 3 days  You may become infected by touching the object or surface and then touching your eyes or mouth  Help lower the risk for COVID-19:   Wash your hands often throughout the day  Use soap and water   Rub your soapy hands together, lacing your fingers, for at least 20 seconds  Rinse with warm, running water  Dry your hands with a clean towel or paper towel  Use hand  that contains alcohol if soap and water are not available  Teach children how to wash their hands and use hand   Cover sneezes and coughs  Turn your face away and cover your mouth and nose with a tissue  Throw the tissue away  Use the bend of your arm if a tissue is not available  Then wash your hands well with soap and water or use hand   Teach children how to cover a cough or sneeze  Wear a face covering (mask) when needed  Use a cloth covering with at least 2 layers  You can also create layers by putting a cloth covering over a disposable non-medical mask  Cover your mouth and your nose  Follow worldwide, national, and local social distancing guidelines  Keep at least 6 feet (2 meters) between you and others  Try not to touch your face  If you get the virus on your hands, you can transfer it to your eyes, nose, or mouth and become infected  You can also transfer it to objects, surfaces, or people  Clean and disinfect high-touch surfaces and objects often  Use disinfecting wipes, or make a solution of 4 teaspoons of bleach in 1 quart (4 cups) of water  Ask about other vaccines you may need  Get the influenza (flu) vaccine as soon as recommended each year, usually starting in September or October  Get the pneumonia vaccine if recommended  Your healthcare provider can tell you if you should also get other vaccines, and when to get them  Follow social distancing guidelines:  National and local social distancing rules vary  Rules and restrictions may change over time as restrictions are lifted  The following are general guidelines:  Stay home if you are sick or think you may have COVID-19  It is important to stay home if you are waiting for a testing appointment or for test results      Avoid close physical contact with anyone who does not live in your home  Do not shake hands with, hug, or kiss a person as a greeting  If you must use public transportation (such as a bus or subway), try to sit or stand away from others  Wear your face covering  Avoid in-person gatherings and crowds  Attend virtually if possible  Follow up with your doctor as directed:  Write down your questions so you remember to ask them during your visits  For more information:   [facility]  [address] [place] , [address]  Phone: 2- [phone] - 114-2198  Web Address: [employer] com br    © Copyright [employer] 2022 Information is for End User's use only and may not be sold, redistributed or otherwise used for commercial purposes  All illustrations and images included in CareNotes® are the copyrighted property of [employer]  or [facility] Svetlana Broussard   The above information is an  only  It is not intended as medical advice for individual conditions or treatments  Talk to your doctor, nurse or pharmacist before following any medical regimen to see if it is safe and effective for you

## 2022-10-17 ENCOUNTER — TELEPHONE (OUTPATIENT)
Dept: FAMILY MEDICINE CLINIC | Facility: CLINIC | Age: 87
End: 2022-10-17

## 2022-10-17 NOTE — TELEPHONE ENCOUNTER
Patient is taking magnesium 250 mg 1x daily  Turmeric 1000 mg 1x day  Vit-D3  1000 IU 1x per day  Vit B-12 1000 mg (only takes when she isn't taking her multivitamin)

## 2022-10-17 NOTE — TELEPHONE ENCOUNTER
Patient daughter called and reports patient has excessive swelling in her feet ongoing since her last visit  She is unable to get shoes on at all at this time  Is concerned because they have a wedding to attend this coming weekend  She also tested positive on 10/14/22  Sx began 10/12/22  Was seen at [facility] and had legs checked there but reports they "weren't that bad" at that time  Patient daughter reports she believes leg swelling is medication related  I offered her a VV today to discuss these issues but she refused and asked for a message to be sent instead  Please advise

## 2022-10-17 NOTE — TELEPHONE ENCOUNTER
Daughter called and reports last time patient was on this lower dose in April and by September BP was too high and patient was experiencing dizziness and falls  She is asking for the med to be totally changed instead to avoid this complication  Please advise

## 2022-10-17 NOTE — TELEPHONE ENCOUNTER
Have her decrease amlodipine 10 mg to 1/2 tablet daily  If she continues with ankle swelling, will have to stop amlodipine and switch to something different

## 2022-10-18 NOTE — TELEPHONE ENCOUNTER
Patient states she took half the amlodipine and that helped, no dizziness or unstableness, she feels good so she would like to keep trying that for a while

## 2023-01-11 ENCOUNTER — RA CDI HCC (OUTPATIENT)
Dept: OTHER | Facility: HOSPITAL | Age: 88
End: 2023-01-11

## 2023-01-11 NOTE — PROGRESS NOTES
[facility]  coding opportunities       Chart reviewed, no opportunity found:   [address]        Patients Insurance     Medicare Insurance: [employer] Advantage

## 2023-01-16 ENCOUNTER — OFFICE VISIT (OUTPATIENT)
Dept: FAMILY MEDICINE CLINIC | Facility: CLINIC | Age: 88
End: 2023-01-16

## 2023-01-16 VITALS
WEIGHT: 247.6 LBS | DIASTOLIC BLOOD PRESSURE: 80 MMHG | SYSTOLIC BLOOD PRESSURE: 132 MMHG | OXYGEN SATURATION: 95 % | TEMPERATURE: 97.4 F | HEIGHT: 60 IN | HEART RATE: 62 BPM | BODY MASS INDEX: 48.61 KG/M2

## 2023-01-16 DIAGNOSIS — E66.01 MORBID OBESITY (HCC): ICD-10-CM

## 2023-01-16 DIAGNOSIS — I48.0 PAROXYSMAL A-FIB (HCC): ICD-10-CM

## 2023-01-16 DIAGNOSIS — E78.2 MIXED HYPERLIPIDEMIA: ICD-10-CM

## 2023-01-16 DIAGNOSIS — I10 PRIMARY HYPERTENSION: Primary | ICD-10-CM

## 2023-01-16 RX ORDER — AMLODIPINE BESYLATE 10 MG/1
5 TABLET ORAL DAILY
Qty: 60 TABLET | Refills: 0
Start: 2023-01-16

## 2023-01-16 NOTE — PROGRESS NOTES
Chief Complaint   Patient presents with   • Follow-up     3 month        HPI   Here for follow-up of hypertension, hyperlipidemia, paroxysmal atrial fibrillation, and leg edema  Since last visit, amlodipine has been decreased to 5 mg daily  Ankle swelling is improved  She does check her blood pressure which is usually 130-150 but she is using a wrist cuff  She is feeling okay  She did get COVID 3 days after last visit 3 months ago but did okay  Past Medical History:   Diagnosis Date   • Afib [facility])    • Colon polyp 4/19/2018    Formatting of this note might be different from the original  Colonoscopy 4/19/18   • Hyperlipidemia    • Hypertension    • Pre-diabetes 10/10/2022    A1c about 6 2 since 2018   • Urge incontinence of urine 5/6/2016        Past Surgical History:   Procedure Laterality Date   • HYSTERECTOMY     • KNEE SURGERY Bilateral 2012       Social History     Tobacco Use   • Smoking status: Never   • Smokeless tobacco: Never   Substance Use Topics   • Alcohol use: Never       Social History     Social History Narrative     since 2005 after 47 year marriage  Three children  Six grandchildren  Seven great grandchildren  Lives alone  In her own house [place]  Drives  The following portions of the patient's history were reviewed and updated as appropriate: allergies, current medications, past family history, past medical history, past social history, past surgical history and problem list       Review of Systems       /80   Pulse 62   Temp (!) 97 4 °F (36 3 °C) (Temporal)   Ht 5' (1 524 m)   Wt 112 kg (247 lb 9 6 oz)   SpO2 95%   BMI 48 36 kg/m²      Physical Exam   Appears well  Lungs are clear  Heart regular with no murmurs or gallops  Abdomen nontender  Trace ankle edema  Appears chronic  Mood is upbeat  Affect appropriate                Current Outpatient Medications:   •  amLODIPine (NORVASC) 10 mg tablet, Take 0 5 tablets (5 mg total) by mouth daily, Disp: 60 tablet, Rfl: 0  •  cholecalciferol (VITAMIN D3) 400 units tablet, Take 1 tablet by mouth daily, Disp: , Rfl:   •  cyanocobalamin (VITAMIN B-12) 100 mcg tablet, Take by mouth, Disp: , Rfl:   •  ELDERBERRY PO, Take by mouth, Disp: , Rfl:   •  FISH OIL-KRILL OIL PO, Take by mouth, Disp: , Rfl:   •  hydrochlorothiazide (HYDRODIURIL) 25 mg tablet, Take 25 mg by mouth daily, Disp: , Rfl:   •  metoprolol tartrate (LOPRESSOR) 100 mg tablet, Take 1 tablet by mouth 2 (two) times a day, Disp: , Rfl:   •  Multiple Vitamin (Daily Value Multivitamin) TABS, Take by mouth, Disp: , Rfl:   •  rivaroxaban (XARELTO) 20 mg tablet, Take 1 tablet by mouth daily, Disp: , Rfl:   •  simvastatin (ZOCOR) 20 mg tablet, Take 1 tablet by mouth daily, Disp: , Rfl:   •  zinc gluconate 50 mg tablet, Take 50 mg by mouth daily, Disp: , Rfl:      No problem-specific Assessment & Plan notes found for this encounter  Diagnoses and all orders for this visit:    Primary hypertension  -     amLODIPine (NORVASC) 10 mg tablet; Take 0 5 tablets (5 mg total) by mouth daily    Mixed hyperlipidemia    Paroxysmal A-fib ([address] )    Morbid obesity ([facility] [address] )        Patient Instructions   Blood pressure appears to be well controlled  Medications are reviewed and remain the same  Recommend getting the COVID booster although she is reluctant  Declines flu shot  Recheck in 6 months

## 2023-01-16 NOTE — PATIENT INSTRUCTIONS
Blood pressure appears to be well controlled  Medications are reviewed and remain the same  Recommend getting the COVID booster although she is reluctant  Declines flu shot  Recheck in 6 months

## 2023-03-03 DIAGNOSIS — E78.2 MIXED HYPERLIPIDEMIA: ICD-10-CM

## 2023-03-03 DIAGNOSIS — I10 PRIMARY HYPERTENSION: Primary | ICD-10-CM

## 2023-03-03 DIAGNOSIS — I48.0 PAROXYSMAL A-FIB (HCC): ICD-10-CM

## 2023-03-03 RX ORDER — HYDROCHLOROTHIAZIDE 25 MG/1
25 TABLET ORAL DAILY
Qty: 90 TABLET | Refills: 3 | Status: SHIPPED | OUTPATIENT
Start: 2023-03-03

## 2023-03-03 RX ORDER — METOPROLOL TARTRATE 100 MG/1
100 TABLET ORAL 2 TIMES DAILY
Qty: 180 TABLET | Refills: 3 | Status: SHIPPED | OUTPATIENT
Start: 2023-03-03

## 2023-03-03 RX ORDER — SIMVASTATIN 20 MG
20 TABLET ORAL DAILY
Qty: 90 TABLET | Refills: 3 | Status: SHIPPED | OUTPATIENT
Start: 2023-03-03

## 2023-07-12 ENCOUNTER — RA CDI HCC (OUTPATIENT)
Dept: OTHER | Facility: HOSPITAL | Age: 88
End: 2023-07-12

## 2023-07-12 NOTE — PROGRESS NOTES
[address] [facility] coding opportunities       Chart reviewed, no opportunity found: [address]        Patients Insurance     Medicare Insurance: [employer]

## 2023-07-14 ENCOUNTER — OFFICE VISIT (OUTPATIENT)
Dept: FAMILY MEDICINE CLINIC | Facility: CLINIC | Age: 88
End: 2023-07-14
Payer: COMMERCIAL

## 2023-07-14 VITALS
TEMPERATURE: 97.6 F | WEIGHT: 254.4 LBS | BODY MASS INDEX: 49.94 KG/M2 | HEART RATE: 65 BPM | DIASTOLIC BLOOD PRESSURE: 74 MMHG | OXYGEN SATURATION: 96 % | HEIGHT: 60 IN | SYSTOLIC BLOOD PRESSURE: 130 MMHG

## 2023-07-14 DIAGNOSIS — E78.2 MIXED HYPERLIPIDEMIA: ICD-10-CM

## 2023-07-14 DIAGNOSIS — R73.03 PRE-DIABETES: ICD-10-CM

## 2023-07-14 DIAGNOSIS — I48.0 PAROXYSMAL A-FIB (HCC): ICD-10-CM

## 2023-07-14 DIAGNOSIS — E66.01 MORBID OBESITY (HCC): ICD-10-CM

## 2023-07-14 DIAGNOSIS — I10 PRIMARY HYPERTENSION: Primary | ICD-10-CM

## 2023-07-14 PROCEDURE — 99214 OFFICE O/P EST MOD 30 MIN: CPT | Performed by: FAMILY MEDICINE

## 2023-07-14 RX ORDER — AMLODIPINE BESYLATE 5 MG/1
5 TABLET ORAL DAILY
Qty: 90 TABLET | Refills: 3 | Status: SHIPPED | OUTPATIENT
Start: 2023-07-14

## 2023-07-14 RX ORDER — ATORVASTATIN CALCIUM 10 MG/1
10 TABLET, FILM COATED ORAL DAILY
Qty: 90 TABLET | Refills: 3 | Status: SHIPPED | OUTPATIENT
Start: 2023-07-14

## 2023-07-14 NOTE — PATIENT INSTRUCTIONS
Looks great. Medications remain the same. Except switch from simvastatin 20 mg to 10 mg of atorvastatin because of interaction between simvastatin and amlodipine. Check blood work to include lipid profile, blood sugar, and kidney function. Rec in the [facility] which can be obtained at local drugstore. Recheck in 6 months.

## 2023-07-14 NOTE — PROGRESS NOTES
Chief Complaint   Patient presents with   • Follow-up        HPI   Here for follow-up of hypertension, hyperlipidemia, paroxysmal atrial fibrillation, and leg edema. Here with her daughter. Celebrated her 89th birthday 3 months ago. Had a big party. Not a surprise. No longer driving. Did not renew her license. Lives close all her kids and grandkids and is well taking care of. Will be a great great-grandmother next [place]. Past Medical History:   Diagnosis Date   • Afib [facility])    • Colon polyp 2018    Formatting of this note might be different from the original. Colonoscopy 18   • Hyperlipidemia    • Hypertension    • Pre-diabetes 10/10/2022    A1c about 6.2 since 2018   • Urge incontinence of urine 2016        Past Surgical History:   Procedure Laterality Date   • HYSTERECTOMY     • KNEE SURGERY Bilateral        Social History     Tobacco Use   • Smoking status: Never   • Smokeless tobacco: Never   Substance Use Topics   • Alcohol use: Never       Social History     Social History Narrative     since  after 47 year marriage. Three children. Six grandchildren. Seven great grandchildren. Lives alone. In her own house which is a ranch. Drives. -has a 80-year-old sister, 22-year-old brother and a 22-year-old sister. Lost 1 sibling. Who  at 80. The following portions of the patient's history were reviewed and updated as appropriate: allergies, current medications, past family history, past medical history, past social history, past surgical history and problem list.      Review of Systems       /74 (BP Location: Left arm, Patient Position: Sitting, Cuff Size: Large)   Pulse 65   Temp 97.6 °F (36.4 °C) (Temporal)   Ht 5' (1.524 m)   Wt 115 kg (254 lb 6.4 oz)   SpO2 96%   BMI 49.68 kg/m²      Physical Exam   Looks great. Lungs are clear. Heart regular with occasional extra beat. Abdomen nontender.   Legs are brawny but no edema.  Mood is upbeat. Affect appropriate. Current Outpatient Medications:   •  amLODIPine (NORVASC) 10 mg tablet, Take 0.5 tablets (5 mg total) by mouth daily, Disp: 60 tablet, Rfl: 0  •  cholecalciferol (VITAMIN D3) 400 units tablet, Take 1 tablet by mouth daily, Disp: , Rfl:   •  cyanocobalamin (VITAMIN B-12) 100 mcg tablet, Take by mouth, Disp: , Rfl:   •  ELDERBERRY PO, Take by mouth, Disp: , Rfl:   •  FISH OIL-KRILL OIL PO, Take by mouth, Disp: , Rfl:   •  hydrochlorothiazide (HYDRODIURIL) 25 mg tablet, Take 1 tablet (25 mg total) by mouth daily, Disp: 90 tablet, Rfl: 3  •  metoprolol tartrate (LOPRESSOR) 100 mg tablet, Take 1 tablet (100 mg total) by mouth 2 (two) times a day, Disp: 180 tablet, Rfl: 3  •  Multiple Vitamin (Daily Value Multivitamin) TABS, Take by mouth, Disp: , Rfl:   •  rivaroxaban (XARELTO) 20 mg tablet, Take 1 tablet (20 mg total) by mouth daily, Disp: 90 tablet, Rfl: 3  •  simvastatin (ZOCOR) 20 mg tablet, Take 1 tablet (20 mg total) by mouth daily, Disp: 90 tablet, Rfl: 3  •  zinc gluconate 50 mg tablet, Take 50 mg by mouth daily, Disp: , Rfl:      No problem-specific Assessment & Plan notes found for this encounter. Diagnoses and all orders for this visit:    Primary hypertension    Paroxysmal A-fib ([address])    Mixed hyperlipidemia    Pre-diabetes    Morbid obesity [facility])        Patient Instructions   Looks great. Medications remain the same. Check blood work to include lipid profile, blood sugar, and kidney function. Rec in the [facility] which can be obtained at local drugstore. Recheck in 6 months.

## 2023-08-14 ENCOUNTER — HOSPITAL ENCOUNTER (INPATIENT)
Facility: HOSPITAL | Age: 88
LOS: 1 days | Discharge: HOME | End: 2023-08-16
Attending: EMERGENCY MEDICINE | Admitting: INTERNAL MEDICINE
Payer: COMMERCIAL

## 2023-08-14 DIAGNOSIS — R55 SYNCOPE: Primary | ICD-10-CM

## 2023-08-14 PROBLEM — S16.1XXA NECK STRAIN: Status: ACTIVE | Noted: 2023-08-14

## 2023-08-14 PROBLEM — R29.90 STROKE-LIKE SYMPTOMS: Status: RESOLVED | Noted: 2022-09-22 | Resolved: 2023-08-14

## 2023-08-14 LAB
2HR DELTA HS TROPONIN: 2 NG/L
ALBUMIN SERPL BCP-MCNC: 3.9 G/DL (ref 3.5–5)
ALP SERPL-CCNC: 57 U/L (ref 34–104)
ALT SERPL W P-5'-P-CCNC: 11 U/L (ref 7–52)
ANION GAP SERPL CALCULATED.3IONS-SCNC: 8 MMOL/L
AST SERPL W P-5'-P-CCNC: 14 U/L (ref 13–39)
BASOPHILS # BLD AUTO: 0.01 THOUSANDS/ÂΜL (ref 0–0.1)
BASOPHILS NFR BLD AUTO: 0 % (ref 0–1)
BILIRUB SERPL-MCNC: 0.56 MG/DL (ref 0.2–1)
BUN SERPL-MCNC: 14 MG/DL (ref 5–25)
CALCIUM SERPL-MCNC: 9.3 MG/DL (ref 8.4–10.2)
CARDIAC TROPONIN I PNL SERPL HS: 10 NG/L
CARDIAC TROPONIN I PNL SERPL HS: 8 NG/L
CHLORIDE SERPL-SCNC: 97 MMOL/L (ref 96–108)
CO2 SERPL-SCNC: 28 MMOL/L (ref 21–32)
CREAT SERPL-MCNC: 0.78 MG/DL (ref 0.6–1.3)
EOSINOPHIL # BLD AUTO: 0.13 THOUSAND/ÂΜL (ref 0–0.61)
EOSINOPHIL NFR BLD AUTO: 2 % (ref 0–6)
ERYTHROCYTE [DISTWIDTH] IN BLOOD BY AUTOMATED COUNT: 12.8 % (ref 11.6–15.1)
GFR SERPL CREATININE-BSD FRML MDRD: 67 ML/MIN/1.73SQ M
GLUCOSE SERPL-MCNC: 128 MG/DL (ref 65–140)
HCT VFR BLD AUTO: 38.3 % (ref 34.8–46.1)
HGB BLD-MCNC: 12.5 G/DL (ref 11.5–15.4)
IMM GRANULOCYTES # BLD AUTO: 0.02 THOUSAND/UL (ref 0–0.2)
IMM GRANULOCYTES NFR BLD AUTO: 0 % (ref 0–2)
LYMPHOCYTES # BLD AUTO: 1.45 THOUSANDS/ÂΜL (ref 0.6–4.47)
LYMPHOCYTES NFR BLD AUTO: 22 % (ref 14–44)
MCH RBC QN AUTO: 28.7 PG (ref 26.8–34.3)
MCHC RBC AUTO-ENTMCNC: 32.6 G/DL (ref 31.4–37.4)
MCV RBC AUTO: 88 FL (ref 82–98)
MONOCYTES # BLD AUTO: 0.51 THOUSAND/ÂΜL (ref 0.17–1.22)
MONOCYTES NFR BLD AUTO: 8 % (ref 4–12)
NEUTROPHILS # BLD AUTO: 4.63 THOUSANDS/ÂΜL (ref 1.85–7.62)
NEUTS SEG NFR BLD AUTO: 68 % (ref 43–75)
NRBC BLD AUTO-RTO: 0 /100 WBCS
PLATELET # BLD AUTO: 231 THOUSANDS/UL (ref 149–390)
PMV BLD AUTO: 8.9 FL (ref 8.9–12.7)
POTASSIUM SERPL-SCNC: 3.7 MMOL/L (ref 3.5–5.3)
PROT SERPL-MCNC: 6.7 G/DL (ref 6.4–8.4)
RBC # BLD AUTO: 4.35 MILLION/UL (ref 3.81–5.12)
SODIUM SERPL-SCNC: 133 MMOL/L (ref 135–147)
TSH SERPL DL<=0.05 MIU/L-ACNC: 2.16 UIU/ML (ref 0.45–4.5)
WBC # BLD AUTO: 6.75 THOUSAND/UL (ref 4.31–10.16)

## 2023-08-14 PROCEDURE — 99284 EMERGENCY DEPT VISIT MOD MDM: CPT

## 2023-08-14 PROCEDURE — 93005 ELECTROCARDIOGRAM TRACING: CPT

## 2023-08-14 PROCEDURE — 99223 1ST HOSP IP/OBS HIGH 75: CPT

## 2023-08-14 PROCEDURE — 84484 ASSAY OF TROPONIN QUANT: CPT

## 2023-08-14 PROCEDURE — 36415 COLL VENOUS BLD VENIPUNCTURE: CPT

## 2023-08-14 PROCEDURE — 99285 EMERGENCY DEPT VISIT HI MDM: CPT | Performed by: EMERGENCY MEDICINE

## 2023-08-14 PROCEDURE — 80053 COMPREHEN METABOLIC PANEL: CPT

## 2023-08-14 PROCEDURE — 85025 COMPLETE CBC W/AUTO DIFF WBC: CPT

## 2023-08-14 PROCEDURE — 84443 ASSAY THYROID STIM HORMONE: CPT

## 2023-08-14 RX ORDER — AMLODIPINE BESYLATE 5 MG/1
5 TABLET ORAL DAILY
Status: DISCONTINUED | OUTPATIENT
Start: 2023-08-15 | End: 2023-08-16 | Stop reason: HOSPADM

## 2023-08-14 RX ORDER — METOPROLOL TARTRATE 100 MG/1
100 TABLET ORAL 2 TIMES DAILY
Status: DISCONTINUED | OUTPATIENT
Start: 2023-08-15 | End: 2023-08-16 | Stop reason: HOSPADM

## 2023-08-14 RX ORDER — MAGNESIUM HYDROXIDE/ALUMINUM HYDROXICE/SIMETHICONE 120; 1200; 1200 MG/30ML; MG/30ML; MG/30ML
30 SUSPENSION ORAL EVERY 6 HOURS PRN
Status: DISCONTINUED | OUTPATIENT
Start: 2023-08-14 | End: 2023-08-16 | Stop reason: HOSPADM

## 2023-08-14 RX ORDER — ATORVASTATIN CALCIUM 10 MG/1
10 TABLET, FILM COATED ORAL DAILY
Status: DISCONTINUED | OUTPATIENT
Start: 2023-08-15 | End: 2023-08-16 | Stop reason: HOSPADM

## 2023-08-14 RX ORDER — LIDOCAINE 50 MG/G
1 PATCH TOPICAL DAILY
Status: DISCONTINUED | OUTPATIENT
Start: 2023-08-14 | End: 2023-08-16 | Stop reason: HOSPADM

## 2023-08-14 RX ORDER — ONDANSETRON 2 MG/ML
4 INJECTION INTRAMUSCULAR; INTRAVENOUS EVERY 6 HOURS PRN
Status: DISCONTINUED | OUTPATIENT
Start: 2023-08-14 | End: 2023-08-16 | Stop reason: HOSPADM

## 2023-08-14 RX ORDER — ACETAMINOPHEN 325 MG/1
975 TABLET ORAL EVERY 8 HOURS PRN
Status: DISCONTINUED | OUTPATIENT
Start: 2023-08-14 | End: 2023-08-16 | Stop reason: HOSPADM

## 2023-08-14 RX ORDER — POLYETHYLENE GLYCOL 3350 17 G/17G
17 POWDER, FOR SOLUTION ORAL DAILY PRN
Status: DISCONTINUED | OUTPATIENT
Start: 2023-08-14 | End: 2023-08-16 | Stop reason: HOSPADM

## 2023-08-14 RX ORDER — HYDROCHLOROTHIAZIDE 25 MG/1
25 TABLET ORAL DAILY
Status: DISCONTINUED | OUTPATIENT
Start: 2023-08-15 | End: 2023-08-16 | Stop reason: HOSPADM

## 2023-08-14 RX ORDER — OMEGA-3S/DHA/EPA/FISH OIL/D3 300MG-1000
400 CAPSULE ORAL DAILY
Status: DISCONTINUED | OUTPATIENT
Start: 2023-08-15 | End: 2023-08-16 | Stop reason: HOSPADM

## 2023-08-14 RX ADMIN — LIDOCAINE 1 PATCH: 50 PATCH CUTANEOUS at 23:45

## 2023-08-14 NOTE — ED PROVIDER NOTES
History  Chief Complaint   Patient presents with   • Syncope     Reports syncope last Tuesday and again yesterday while sitting in a chair. States her head feels funny at this time. AOx3    59-year-old female patient with a history of A-fib on Xarelto, HLD, HTN presenting with episodes of syncope. Patient states that she had an episode of syncope 6 days ago. Patient was talking to her granddaughter when she felt warm and then had an episode of LOC. Patient states that last for couple seconds and then woke up. Denies any chest pain or shortness of breath at the time. Patient had another episode of syncope yesterday after she finished eating. Patient states that she was about to get up when she felt warm again and had an episode while she was sitting down. Denies any head trauma. Denies any chest pain or shortness of breath, nausea, vomiting, diarrhea, blurry vision, headaches but admits to "fall "and head. Prior to Admission Medications   Prescriptions Last Dose Informant Patient Reported? Taking?    ELDERBERRY PO 8/14/2023 Self Yes Yes   Sig: Take by mouth   FISH OIL-KRILL OIL PO 8/14/2023 Self Yes Yes   Sig: Take by mouth   Multiple Vitamin (Daily Value Multivitamin) TABS 8/13/2023 Self Yes Yes   Sig: Take by mouth   amLODIPine (NORVASC) 5 mg tablet 8/14/2023  No Yes   Sig: Take 1 tablet (5 mg total) by mouth daily   atorvastatin (LIPITOR) 10 mg tablet 8/14/2023  No Yes   Sig: Take 1 tablet (10 mg total) by mouth daily   cholecalciferol (VITAMIN D3) 400 units tablet 8/14/2023 Self Yes Yes   Sig: Take 1 tablet by mouth daily   cyanocobalamin (VITAMIN B-12) 100 mcg tablet 8/14/2023 Self Yes Yes   Sig: Take by mouth   hydrochlorothiazide (HYDRODIURIL) 25 mg tablet 8/14/2023 Self No Yes   Sig: Take 1 tablet (25 mg total) by mouth daily   metoprolol tartrate (LOPRESSOR) 100 mg tablet 8/14/2023 Self No Yes   Sig: Take 1 tablet (100 mg total) by mouth 2 (two) times a day   rivaroxaban (XARELTO) 20 mg tablet 8/14/2023 Self No Yes   Sig: Take 1 tablet (20 mg total) by mouth daily   zinc gluconate 50 mg tablet 8/14/2023 Self Yes Yes   Sig: Take 50 mg by mouth daily      Facility-Administered Medications: None       Past Medical History:   Diagnosis Date   • Afib ([address])    • Colon polyp 4/19/2018    Formatting of this note might be different from the original. Colonoscopy 4/19/18   • Hyperlipidemia    • Hypertension    • Pre-diabetes 10/10/2022    A1c about 6.2 since 2018   • Stroke-like symptoms: vertigo and gait imbalance 9/22/2022   • Urge incontinence of urine 5/6/2016       Past Surgical History:   Procedure Laterality Date   • HYSTERECTOMY     • KNEE SURGERY Bilateral 2012       History reviewed. No pertinent family history. I have reviewed and agree with the history as documented. E-Cigarette/Vaping   • E-Cigarette Use Never User      E-Cigarette/Vaping Substances     Social History     Tobacco Use   • Smoking status: Never   • Smokeless tobacco: Never   Vaping Use   • Vaping Use: Never used   Substance Use Topics   • Alcohol use: Never   • Drug use: Never        Review of Systems   Neurological: Positive for syncope. All other systems reviewed and are negative. Physical Exam  ED Triage Vitals   Temperature Pulse Respirations Blood Pressure SpO2   08/14/23 1841 08/14/23 1841 08/14/23 1841 08/14/23 1841 08/14/23 1841   98 °F (36.7 °C) 66 16 152/97 94 %      Temp Source Heart Rate Source Patient Position - Orthostatic VS BP Location FiO2 (%)   08/14/23 1841 08/14/23 1841 08/14/23 1841 08/14/23 1841 --   Oral Monitor Sitting Right arm       Pain Score       08/14/23 2332       No Pain             Orthostatic Vital Signs  Vitals:    08/16/23 0506 08/16/23 0726 08/16/23 1100 08/16/23 1200   BP: 161/80 166/90 151/84 151/84   Pulse:  65 57 55   Patient Position - Orthostatic VS:  Sitting Sitting Sitting       Physical Exam  Vitals reviewed. Constitutional:       Appearance: Normal appearance.    HENT: Head: Normocephalic and atraumatic. Nose: Nose normal.      Mouth/Throat:      Mouth: Mucous membranes are moist.      Pharynx: Oropharynx is clear. Eyes:      Extraocular Movements: Extraocular movements intact. Conjunctiva/sclera: Conjunctivae normal.   Cardiovascular:      Rate and Rhythm: Normal rate and regular rhythm. Pulses: Normal pulses. Heart sounds: Normal heart sounds. Pulmonary:      Effort: Pulmonary effort is normal.      Breath sounds: Normal breath sounds. Abdominal:      General: Bowel sounds are normal.      Palpations: Abdomen is soft. Tenderness: There is no abdominal tenderness. Musculoskeletal:         General: Normal range of motion. Cervical back: Normal range of motion. Skin:     General: Skin is warm and dry. Neurological:      General: No focal deficit present. Mental Status: She is alert and oriented to person, place, and time. Mental status is at baseline. Cranial Nerves: No cranial nerve deficit. Sensory: No sensory deficit. Motor: No weakness.       Coordination: Coordination normal.         ED Medications  Medications - No data to display    Diagnostic Studies  Results Reviewed     Procedure Component Value Units Date/Time    HS Troponin I 4hr [[id number]]  (Normal) Collected: 08/14/23 2354    Lab Status: Final result Specimen: Blood from Hand, Right Updated: 08/15/23 0034     hs TnI 4hr 10 ng/L      Delta 4hr hsTnI 2 ng/L     HS Troponin I 2hr [[id number]]  (Normal) Collected: 08/14/23 2135    Lab Status: Final result Specimen: Blood from Arm, Right Updated: 08/14/23 2204     hs TnI 2hr 10 ng/L      Delta 2hr hsTnI 2 ng/L     TSH, 3rd generation with Free T4 reflex [[id number]]  (Normal) Collected: 08/14/23 1930    Lab Status: Final result Specimen: Blood from Arm, Right Updated: 08/14/23 2020     TSH 3RD GENERATON 2.156 uIU/mL     HS Troponin 0hr (reflex protocol) [[id number]]  (Normal) Collected: 08/14/23 1930    Lab Status: Final result Specimen: Blood from Arm, Right Updated: 08/14/23 2011     hs TnI 0hr 8 ng/L     Comprehensive metabolic panel [[id number]]  (Abnormal) Collected: 08/14/23 1930    Lab Status: Final result Specimen: Blood from Arm, Right Updated: 08/14/23 2009     Sodium 133 mmol/L      Potassium 3.7 mmol/L      Chloride 97 mmol/L      CO2 28 mmol/L      ANION GAP 8 mmol/L      BUN 14 mg/dL      Creatinine 0.78 mg/dL      Glucose 128 mg/dL      Calcium 9.3 mg/dL      AST 14 U/L      ALT 11 U/L      Alkaline Phosphatase 57 U/L      Total Protein 6.7 g/dL      Albumin 3.9 g/dL      Total Bilirubin 0.56 mg/dL      eGFR 67 ml/min/1.73sq m     Narrative:      [facility] guidelines for Chronic Kidney Disease (CKD):   •  Stage 1 with normal or high GFR (GFR > 90 mL/min/1.73 square meters)  •  Stage 2 Mild CKD (GFR = 60-89 mL/min/1.73 square meters)  •  Stage 3A Moderate CKD (GFR = 45-59 mL/min/1.73 square meters)  •  Stage 3B Moderate CKD (GFR = 30-44 mL/min/1.73 square meters)  •  Stage 4 Severe CKD (GFR = 15-29 mL/min/1.73 square meters)  •  Stage 5 End Stage CKD (GFR <15 mL/min/1.73 square meters)  Note: GFR calculation is accurate only with a steady state creatinine    CBC and differential [[id number]] Collected: 08/14/23 1930    Lab Status: Final result Specimen: Blood from Arm, Right Updated: 08/14/23 1937     WBC 6.75 Thousand/uL      RBC 4.35 Million/uL      Hemoglobin 12.5 g/dL      Hematocrit 38.3 %      MCV 88 fL      MCH 28.7 pg      MCHC 32.6 g/dL      RDW 12.8 %      MPV 8.9 fL      Platelets 863 Thousands/uL      nRBC 0 /100 WBCs      Neutrophils Relative 68 %      Immat GRANS % 0 %      Lymphocytes Relative 22 %      Monocytes Relative 8 %      Eosinophils Relative 2 %      Basophils Relative 0 %      Neutrophils Absolute 4.63 Thousands/µL      Immature Grans Absolute 0.02 Thousand/uL      Lymphocytes Absolute 1.45 Thousands/µL      Monocytes Absolute 0.51 Thousand/µL Eosinophils Absolute 0.13 Thousand/µL      Basophils Absolute 0.01 Thousands/µL                  CT head wo contrast   Final Result by Marcy Montero DO (08/16 3675)      No acute intracranial abnormality. Workstation performed: [id number]               Procedures  Procedures      ED Course  ED Course as of 08/17/23 1349   Mon Aug 14, 2023   2132 Trop 8. D/w patient and family about admission. Offered admission as concern for syncope at home however patient would like to go home. States does not want to stay. Understand risk of going home and falling and having Injuries. HEART Risk Score    Flowsheet Row Most Recent Value   Heart Score Risk Calculator    History 0 Filed at: 08/17/2023 1348   ECG 0 Filed at: 08/17/2023 1348   Age 2 Filed at: 08/17/2023 1348   Risk Factors 1 Filed at: 08/17/2023 1348   Troponin 0 Filed at: 08/17/2023 1348   HEART Score 3 Filed at: 08/17/2023 1348                      SBIRT 22yo+    Flowsheet Row Most Recent Value   Initial Alcohol Screen: US AUDIT-C     1. How often do you have a drink containing alcohol? 0 Filed at: 08/14/2023 1903   2. How many drinks containing alcohol do you have on a typical day you are drinking? 0 Filed at: 08/14/2023 1903   3b. FEMALE Any Age, or MALE 65+: How often do you have 4 or more drinks on one occassion? 0 Filed at: 08/14/2023 1903   Audit-C Score 0 Filed at: 08/14/2023 1903   ANDREAS: How many times in the past year have you. .. Used an illegal drug or used a prescription medication for non-medical reasons? Never Filed at: 08/14/2023 1903                Medical Decision Making  26-year-old female patient presenting with 2 episodes of syncope. Patient states that she has a prodrome of feeling warm. No chest pain or shortness of breath. Patient states 2 episodes in the past week. Denies any fall or injuries or trauma. DDx includes vasovagal syncope versus cardiogenic syncope. Neuro exam within normal limits.   Troponin negative, labs otherwise within normal limits. Discussed with patient and family about admission. Patient initially refused and states would like to go home. On reevaluation, patient and family made the decision to stay for observation due to concern for cardiogenic etiology. Admitted to medicine    Syncope:     Details: Likely vasovagal but likely needs observation for arrhythmias  Amount and/or Complexity of Data Reviewed  Labs: ordered. Discussion of management or test interpretation with external provider(s): Admitted to medicine    Risk  Decision regarding hospitalization. Disposition  Final diagnoses:   Syncope     Time reflects when diagnosis was documented in both MDM as applicable and the Disposition within this note     Time User Action Codes Description Comment    8/14/2023  9:32 PM Midge Tarik Add [R55] Syncope       ED Disposition     ED Disposition   Admit    Condition   Stable    Date/Time   Mon Aug 14, 2023 10:07 PM    Comment   Case was discussed with Patrick Cooper and the patient's admission status was agreed to be Observation to the service of Dr. Nisa Mitchell .            Follow-up Information     Follow up With Specialties Details Why Contact Info    Morales Lora MD Family Medicine Schedule an appointment as soon as possible for a visit   [address] [address]  [phone]            Discharge Medication List as of 8/16/2023  3:06 PM      CONTINUE these medications which have NOT CHANGED    Details   amLODIPine (NORVASC) 5 mg tablet Take 1 tablet (5 mg total) by mouth daily, Starting Fri 7/14/2023, Normal      atorvastatin (LIPITOR) 10 mg tablet Take 1 tablet (10 mg total) by mouth daily, Starting Fri 7/14/2023, Normal      cholecalciferol (VITAMIN D3) 400 units tablet Take 1 tablet by mouth daily, Historical Med      cyanocobalamin (VITAMIN B-12) 100 mcg tablet Take by mouth, Historical Med      ELDERBERRY PO Take by mouth, Historical Med      FISH OIL-KRILL OIL PO Take by mouth, Historical Med      hydrochlorothiazide (HYDRODIURIL) 25 mg tablet Take 1 tablet (25 mg total) by mouth daily, Starting Fri 3/3/2023, Normal      metoprolol tartrate (LOPRESSOR) 100 mg tablet Take 1 tablet (100 mg total) by mouth 2 (two) times a day, Starting Fri 3/3/2023, Normal      Multiple Vitamin (Daily Value Multivitamin) TABS Take by mouth, Historical Med      rivaroxaban (XARELTO) 20 mg tablet Take 1 tablet (20 mg total) by mouth daily, Starting Fri 3/3/2023, Normal      zinc gluconate 50 mg tablet Take 50 mg by mouth daily, Historical Med           Outpatient Discharge Orders   Discharge Diet     Activity as tolerated       PDMP Review       Value Time User    PDMP Reviewed  Yes 8/14/2023 11:21 PM Scooby Bai PA-C           ED Provider  Attending physically available and evaluated [place]elli[place] Level. I managed the patient along with the ED Attending.     Electronically Signed by         Beryle General, MD  08/17/23 3028

## 2023-08-15 ENCOUNTER — APPOINTMENT (INPATIENT)
Dept: CT IMAGING | Facility: HOSPITAL | Age: 88
End: 2023-08-15
Payer: COMMERCIAL

## 2023-08-15 LAB
4HR DELTA HS TROPONIN: 2 NG/L
ANION GAP SERPL CALCULATED.3IONS-SCNC: 9 MMOL/L
ATRIAL RATE: 53 BPM
ATRIAL RATE: 66 BPM
BUN SERPL-MCNC: 11 MG/DL (ref 5–25)
CALCIUM SERPL-MCNC: 9.3 MG/DL (ref 8.4–10.2)
CARDIAC TROPONIN I PNL SERPL HS: 10 NG/L
CHLORIDE SERPL-SCNC: 99 MMOL/L (ref 96–108)
CO2 SERPL-SCNC: 26 MMOL/L (ref 21–32)
CREAT SERPL-MCNC: 0.64 MG/DL (ref 0.6–1.3)
ERYTHROCYTE [DISTWIDTH] IN BLOOD BY AUTOMATED COUNT: 12.8 % (ref 11.6–15.1)
GFR SERPL CREATININE-BSD FRML MDRD: 78 ML/MIN/1.73SQ M
GLUCOSE P FAST SERPL-MCNC: 106 MG/DL (ref 65–99)
GLUCOSE SERPL-MCNC: 104 MG/DL (ref 65–140)
GLUCOSE SERPL-MCNC: 106 MG/DL (ref 65–140)
GLUCOSE SERPL-MCNC: 108 MG/DL (ref 65–140)
GLUCOSE SERPL-MCNC: 113 MG/DL (ref 65–140)
GLUCOSE SERPL-MCNC: 120 MG/DL (ref 65–140)
HCT VFR BLD AUTO: 41.9 % (ref 34.8–46.1)
HGB BLD-MCNC: 14 G/DL (ref 11.5–15.4)
MCH RBC QN AUTO: 29.1 PG (ref 26.8–34.3)
MCHC RBC AUTO-ENTMCNC: 33.4 G/DL (ref 31.4–37.4)
MCV RBC AUTO: 87 FL (ref 82–98)
P AXIS: 67 DEGREES
P AXIS: 70 DEGREES
PLATELET # BLD AUTO: 251 THOUSANDS/UL (ref 149–390)
PMV BLD AUTO: 9.2 FL (ref 8.9–12.7)
POTASSIUM SERPL-SCNC: 3.5 MMOL/L (ref 3.5–5.3)
PR INTERVAL: 214 MS
PR INTERVAL: 222 MS
QRS AXIS: -8 DEGREES
QRS AXIS: 5 DEGREES
QRSD INTERVAL: 90 MS
QRSD INTERVAL: 92 MS
QT INTERVAL: 432 MS
QT INTERVAL: 472 MS
QTC INTERVAL: 442 MS
QTC INTERVAL: 452 MS
RBC # BLD AUTO: 4.81 MILLION/UL (ref 3.81–5.12)
SODIUM SERPL-SCNC: 134 MMOL/L (ref 135–147)
T WAVE AXIS: 43 DEGREES
T WAVE AXIS: 46 DEGREES
VENTRICULAR RATE: 53 BPM
VENTRICULAR RATE: 66 BPM
WBC # BLD AUTO: 6.58 THOUSAND/UL (ref 4.31–10.16)

## 2023-08-15 PROCEDURE — 97167 OT EVAL HIGH COMPLEX 60 MIN: CPT

## 2023-08-15 PROCEDURE — 85027 COMPLETE CBC AUTOMATED: CPT

## 2023-08-15 PROCEDURE — G1004 CDSM NDSC: HCPCS

## 2023-08-15 PROCEDURE — 93010 ELECTROCARDIOGRAM REPORT: CPT | Performed by: INTERNAL MEDICINE

## 2023-08-15 PROCEDURE — 99232 SBSQ HOSP IP/OBS MODERATE 35: CPT | Performed by: INTERNAL MEDICINE

## 2023-08-15 PROCEDURE — 82948 REAGENT STRIP/BLOOD GLUCOSE: CPT

## 2023-08-15 PROCEDURE — 70450 CT HEAD/BRAIN W/O DYE: CPT

## 2023-08-15 PROCEDURE — 80048 BASIC METABOLIC PNL TOTAL CA: CPT

## 2023-08-15 RX ADMIN — RIVAROXABAN 20 MG: 20 TABLET, FILM COATED ORAL at 08:03

## 2023-08-15 RX ADMIN — HYDROCHLOROTHIAZIDE 25 MG: 25 TABLET ORAL at 08:03

## 2023-08-15 RX ADMIN — METOPROLOL TARTRATE 100 MG: 100 TABLET, FILM COATED ORAL at 08:03

## 2023-08-15 RX ADMIN — METOPROLOL TARTRATE 100 MG: 100 TABLET, FILM COATED ORAL at 18:09

## 2023-08-15 RX ADMIN — VITAM B12 100 MCG: 100 TAB at 08:06

## 2023-08-15 RX ADMIN — LIDOCAINE 1 PATCH: 50 PATCH CUTANEOUS at 21:29

## 2023-08-15 RX ADMIN — CHOLECALCIFEROL TAB 10 MCG (400 UNIT) 400 UNITS: 10 TAB at 08:31

## 2023-08-15 RX ADMIN — AMLODIPINE BESYLATE 5 MG: 5 TABLET ORAL at 08:03

## 2023-08-15 RX ADMIN — ACETAMINOPHEN 325MG 975 MG: 325 TABLET ORAL at 12:59

## 2023-08-15 RX ADMIN — ATORVASTATIN CALCIUM 10 MG: 10 TABLET, FILM COATED ORAL at 08:03

## 2023-08-15 NOTE — ASSESSMENT & PLAN NOTE
A1c has been 6.2 since 2018, syncopal symptoms occurring in AM around breakfast     Plan:  • Monitor BG while inpatient for hypoglycemic events

## 2023-08-15 NOTE — ASSESSMENT & PLAN NOTE
Hx PAF, rate controlled on admission, NSR     Plan:  • EKG reviewed with NSR HR 50s  • Continue PTA metoprolol 100mg twice daily  • Anticoagulation: Xarelto 20mg daily

## 2023-08-15 NOTE — ASSESSMENT & PLAN NOTE
Presenting tonight with episodes of syncope x2. One episode on last Tuesday then again Sunday. Prodrome of feeling hot/flushed then brief (seconds) loss of consciousness. Both times occurring in the AM (9-10). Denies chest pain/palpitations/weakness/changes in vision/dizziness/tongue bite/incontinence. No recent changes in medications. TTE 09/23/22 reviewed with LVEF 78% grade I diastolic dysfunction, AV moderately thickened/calcified velocity is normal    Plan:  • Unclear etiology, unlikely neurologic given no focal deficits/evidence of history of seizures. Possibly cardiac arrhythmogenic, recent TTE 6mo prior noted above. Orthostasis/volume depleted or drug induced are also possible. Lastly vasovagal/hypoglycemia as well.    • Continue antihypertensives but these may need titration   • Check orthostatic BP   • Monitor AC/HS BG   • Maintain telemetry overnight, may benefit from holter/[employer] DISPLAY PLAN FREE TEXT

## 2023-08-15 NOTE — PLAN OF CARE
Problem: OCCUPATIONAL THERAPY ADULT  Goal: Performs self-care activities at highest level of function for planned discharge setting. See evaluation for individualized goals. Description: Treatment Interventions: ADL retraining, Functional transfer training, UE strengthening/ROM, Endurance training, Patient/family training, Equipment evaluation/education, Compensatory technique education, Continued evaluation, Energy conservation, Activityengagement          See flowsheet documentation for full assessment, interventions and recommendations. Note: Limitation: Decreased ADL status, Decreased UE strength, Decreased Safe judgement during ADL, Decreased endurance, Decreased self-care trans, Decreased high-level ADLs  Prognosis: Good  Assessment: Patient is a 80y.o. year old female seen for OT eval s/p admit to [facility] on 8/14/2023 with syncope, vasovagal, neck strain. Patient with active OT orders and activity orders for Up and OOB as tolerated . Personal factors affecting pt at time of IE include: difficulty performing ADLs and IADLs, difficulty with functional mobility/transfers. Upon evaluation, patient’s functional status as follows: eating: Independent, grooming: supervision , UB bathing: supervision , LB bathing: Mariana, UB dressing: supervision , LB dressing: Mariana, toileting: supervision ; functional transfers: supervision , bed mobility: Merle, functional mobility: CGA and supervision , sitting/standing tolerance: ~5 min without UE support - due to the following deficits impacting occupational performance: weakness, decreased strength , decreased balance, decreased activity tolerance, limited functional reach, increased pain, increased body habitus  and decreased cardiovascular endurance.  These impairments, as well at pt’s [place] home environment, difficulty performing ADLs, difficulty performing IADLs, difficulty performing transfers/mobility, fall risk , functional decline , increased reliance on DME , access to transportation , advanced age and multiple admissions  limit pt’s ability to safely engage in all baseline areas of occupation. Patient would benefit from continued skilled OT therapy while in acute setting to address deficits as defined above and maximize (I) with ADLs and functional mobility. Occupational performance areas to address include: grooming, bathing/shower, toilet hygiene, dressing, medication management, health maintenance, functional mobility, cleaning, meal prep and money management. Based on the aforementioned OT evaluation, functional performance deficits, and assessments, pt has been identified as a high complexity evaluation. From OT standpoint, recommend home with [facility] PT upon D/C. OT will continue to follow pt 2-3x/wk to address the following goals to  w/in 10-14 days.      OT Discharge Recommendation: Home with home health rehabilitation (vs OP PT pending progress)

## 2023-08-15 NOTE — UTILIZATION REVIEW
Date: 8/16    Day 3: Has surpassed a 2nd midnight with active treatments and services, which include work up for Syncope, HTN. Initial Clinical Review    OBSERVATION 8/14 CHANGED TO INPATIENT ON 8/15 @ 1438 - syncope work up. Admission: Date/Time/Statement:   Admission Orders (From admission, onward)     Ordered        08/15/23 1438  Inpatient Admission  Once                      Orders Placed This Encounter   Procedures   • Inpatient Admission     Standing Status:   Standing     Number of Occurrences:   1     Order Specific Question:   Level of Care     Answer:   Med Surg [16]     Order Specific Question:   Estimated length of stay     Answer:   More than 2 Midnights     Order Specific Question:   Certification     Answer:   I certify that inpatient services are medically necessary for this patient for a duration of greater than two midnights. See H&P and MD Progress Notes for additional information about the patient's course of treatment. ED Arrival Information     Expected   -    Arrival   8/14/2023 18:30    Acuity   Urgent            Means of arrival   Walk-In    Escorted by   Family Member    Service   Hospitalist    Admission type   Emergency            Arrival complaint   syncope           Chief Complaint   Patient presents with   • Syncope     Reports syncope last Tuesday and again yesterday while sitting in a chair. States her head feels funny at this time. AOx4       Initial Presentation: 80 y.o. female presents to the ED from home with c/o Syncope. Also had syncopal episode last wk both times in the AM with feeling hot then LOC. Family checked VS on Sunday 8/13 - WNL. No changes in meds, diet. PMH: HTN, prediabetes, PAF on Xarelto. In the ED HR controlled, ECG Sinus jose francisco w/ 1st degree AV block. Labs - low , no troponin spill. On exam trace BLE edema.   She is admitted to OBSERVATION status with Syncope, likely vasovagal - continue home antihypertensives, orthostatics, POC glucose testing AC/HS, tele. Neck strain w/ discomfort - multimodal analgesia, ice/heat. Date: 8/15  CHANGED TO INPATIENT TODAY  CT head ordered. Orthostatics negative. L neck muscle spasm, PRN analgesia. Will remain on Tele with poss of Holter, Zio patch. CT  Head negative for acute disease. On exam no deficits. Date: 8/16  Day 2 IP:    Intermittent HTN. NA remains low 132.      ED Triage Vitals   Temperature Pulse Respirations Blood Pressure SpO2   08/14/23 1841 08/14/23 1841 08/14/23 1841 08/14/23 1841 08/14/23 1841   98 °F (36.7 °C) 66 16 152/97 94 %      Temp Source Heart Rate Source Patient Position - Orthostatic VS BP Location FiO2 (%)   08/14/23 1841 08/14/23 1841 08/14/23 1841 08/14/23 1841 --   Oral Monitor Sitting Right arm       Pain Score       08/14/23 2332       No Pain          Wt Readings from Last 1 Encounters:   08/14/23 114 kg (251 lb 1.6 oz)     Additional Vital Signs:   08/16/23 0726 97.2 °F (36.2 °C) Abnormal  65 18 166/90 -- 96 % None (Room air) Sitting   08/16/23 0506 -- -- -- 161/80 100 -- -- --   08/16/23 0248 97.3 °F (36.3 °C) Abnormal  60 18 172/90 Abnormal  -- 94 % None (Room air) Sitting   08/15/23 2342 97.5 °F (36.4 °C) 58 18 140/58 -- 95 % None (Room air) Lying   08/15/23 1856 97.5 °F (36.4 °C) 67 18 142/60 -- 96 % None (Room air) Sitting     08/15/23 1357 -- 71 18 142/80 -- 94 % None (Room air) Standing - Orthostatic VS   08/15/23 1356 -- 68 18 140/80 -- 96 % None (Room air) Sitting - Orthostatic VS   08/15/23 1354 -- 64 18 142/78 -- 95 % None (Room air) Lying - Orthostatic VS   08/15/23 1113 97.8 °F (36.6 °C) 56 18 142/82 -- 95 % None (Room air) Sitting   08/15/23 0832 -- -- -- -- -- -- None (Room air) --   08/15/23 0724 98 °F (36.7 °C) 61 18 122/78 -- 92 % None (Room air) Lying   08/14/23 2332 98.1 °F (36.7 °C) 59 20 160/76 -- 93 % None (Room air) Sitting   08/14/23 2300 -- 55 20 179/79 Abnormal  114 94 % None (Room air) Lying   08/14/23 2200 -- 54 Abnormal  20 192/83 Abnormal  119 95 % None (Room air) Lying   08/14/23 1945 -- 54 Abnormal  20 176/74 Abnormal  115 95 % None (Room air) Lying     Pertinent Labs/Diagnostic Test Results:     8/14 ECG - Sinus rhythm with 1st degree A-V block  Septal infarct (cited on or before 14-AUG-2023)  Abnormal ECG  8/14 ECG - Sinus bradycardia with 1st degree A-V block  Septal infarct (cited on or before 14-AUG-2023)  Abnormal ECG    8/15 Echo - P    CT head wo contrast       No acute intracranial abnormality           Results from last 7 days   Lab Units 08/16/23  0503 08/15/23  0434 08/14/23  1930   WBC Thousand/uL 5.36 6.58 6.75   HEMOGLOBIN g/dL 12.8 14.0 12.5   HEMATOCRIT % 39.8 41.9 38.3   PLATELETS Thousands/uL 174 251 231   NEUTROS ABS Thousands/µL 3.43  --  4.63         Results from last 7 days   Lab Units 08/16/23  0503 08/15/23  0434 08/14/23  1930   SODIUM mmol/L 132* 134* 133*   POTASSIUM mmol/L 4.1 3.5 3.7   CHLORIDE mmol/L 97 99 97   CO2 mmol/L 28 26 28   ANION GAP mmol/L 7 9 8   BUN mg/dL 18 11 14   CREATININE mg/dL 0.67 0.64 0.78   EGFR ml/min/1.73sq m 77 78 67   CALCIUM mg/dL 9.1 9.3 9.3   MAGNESIUM mg/dL 2.1  --   --    PHOSPHORUS mg/dL 3.5  --   --      Results from last 7 days   Lab Units 08/16/23  0503 08/14/23  1930   AST U/L 19 14   ALT U/L 10 11   ALK PHOS U/L 54 57   TOTAL PROTEIN g/dL 6.5 6.7   ALBUMIN g/dL 3.8 3.9   TOTAL BILIRUBIN mg/dL 0.79 0.56     Results from last 7 days   Lab Units 08/16/23  0723 08/15/23  2113 08/15/23  1610 08/15/23  1112 08/15/23  0720   POC GLUCOSE mg/dl 113 108 120 104 113     Results from last 7 days   Lab Units 08/16/23  0503 08/15/23  0434 08/14/23  1930   GLUCOSE RANDOM mg/dL 102 106 128     Results from last 7 days   Lab Units 08/14/23  2724 08/14/23 2135 08/14/23 1930   HS TNI 0HR ng/L  --   --  8   HS TNI 2HR ng/L  --  10  --    HSTNI D2 ng/L  --  2  --    HS TNI 4HR ng/L 10  --   --    HSTNI D4 ng/L 2  --   --              Results from last 7 days   Lab Units 08/14/23 1930 TSH 3RD GENERATON uIU/mL 2.156     ED Treatment:   Medication Administration from 08/14/2023 1830 to 08/14/2023 2320     None        Past Medical History:   Diagnosis Date   • Afib [facility])    • Colon polyp 4/19/2018    Formatting of this note might be different from the original. Colonoscopy 4/19/18   • Hyperlipidemia    • Hypertension    • Pre-diabetes 10/10/2022    A1c about 6.2 since 2018   • Stroke-like symptoms: vertigo and gait imbalance 9/22/2022   • Urge incontinence of urine 5/6/2016     Present on Admission:  • HTN (hypertension)  • Paroxysmal A-fib (HCC)  • Pre-diabetes      Admitting Diagnosis: Syncope [R55]  Age/Sex: 80 y.o. female  Admission Orders:  Scheduled Medications:  amLODIPine, 5 mg, Oral, Daily  atorvastatin, 10 mg, Oral, Daily  cholecalciferol, 400 Units, Oral, Daily  cyanocobalamin, 100 mcg, Oral, Daily  hydrochlorothiazide, 25 mg, Oral, Daily  lidocaine, 1 patch, Topical, Daily  metoprolol tartrate, 100 mg, Oral, BID  rivaroxaban, 20 mg, Oral, Daily      Continuous IV Infusions:     PRN Meds:  acetaminophen, 975 mg, Oral, Q8H PRN - x 1 8/15  aluminum-magnesium hydroxide-simethicone, 30 mL, Oral, Q6H PRN  ondansetron, 4 mg, Intravenous, Q6H PRN  polyethylene glycol, 17 g, Oral, Daily PRN    Tele  POC GLUCOSE AC/HS WITH [employer] COVERAGE   Orthostatics q shift    Network Utilization Review Department  ATTENTION: Please call with any questions or concerns to [phone] and carefully listen to the prompts so that you are directed to the right person. All voicemails are confidential.  Maliha Bowman all requests for admission clinical reviews, approved or denied determinations and any other requests to dedicated fax number below belonging to the campus where the patient is receiving treatment.  List of dedicated fax numbers for the Facilities:  [place] DENIALS (Administrative/Medical Necessity) [phone]   [facility] (Maternity/NICU/Pediatrics) [phone]   [facility] Andrew Lawson [address] [phone] 150[address] [facility] [address] [address] [facility] [address] [place] [address] [address] [phone]

## 2023-08-15 NOTE — ASSESSMENT & PLAN NOTE
Reports left sided neck discomfort, non-radiating, no focal neuro deficits.  L side muscle spasm appreciated, limited ROM left side    Plan:  • Multimodal analgesia, tylenol/lidocaine patch, heat/ice PRN

## 2023-08-15 NOTE — H&P
[facility]  H&P  Name: Louie Lyles 80 y.o. female I MRN: [id number]  Unit/Bed#: E4 -01 I Date of Admission: 8/14/2023   Date of Service: 8/14/2023 I Hospital Day: 0      Assessment/Plan   * Syncope, vasovagal  Assessment & Plan  Presenting tonight with episodes of syncope x2. One episode on last Tuesday then again Sunday. Prodrome of feeling hot/flushed then brief (seconds) loss of consciousness. Both times occurring in the AM (9-10). Denies chest pain/palpitations/weakness/changes in vision/dizziness/tongue bite/incontinence. No recent changes in medications. TTE 09/23/22 reviewed with LVEF 92% grade I diastolic dysfunction, AV moderately thickened/calcified velocity is normal    Plan:  • Unclear etiology, unlikely neurologic given no focal deficits/evidence of history of seizures. Possibly cardiac arrhythmogenic, recent TTE 6mo prior noted above. Orthostasis/volume depleted or drug induced are also possible. Lastly vasovagal/hypoglycemia as well. • Continue antihypertensives but these may need titration   • Check orthostatic BP   • Monitor AC/HS BG   • Maintain telemetry overnight, may benefit from holter/Zio    Neck strain  Assessment & Plan  Reports left sided neck discomfort, non-radiating, no focal neuro deficits.  L side muscle spasm appreciated, limited ROM left side    Plan:  • Multimodal analgesia, tylenol/lidocaine patch, heat/ice PRN            Pre-diabetes  Assessment & Plan  A1c has been 6.2 since 2018, syncopal symptoms occurring in AM around breakfast     Plan:  • Monitor BG while inpatient for hypoglycemic events      Paroxysmal A-fib (HCC)  Assessment & Plan  Hx PAF, rate controlled on admission, NSR     Plan:  • EKG reviewed with NSR HR 50s  • Continue PTA metoprolol 100mg twice daily  • Anticoagulation: Xarelto 20mg daily      HTN (hypertension)  Assessment & Plan  Hypertensive on arrival, anxious    Plan:  • Trend BP once settled on floor   • Continue PTA amlodipine 5mg daily, HCTZ 25mg dialy, metoprolol tartrate 100mg twice daily with hold parameters        VTE Pharmacologic Prophylaxis: VTE Score: 4 Moderate Risk (Score 3-4) - Pharmacological DVT Prophylaxis Ordered: rivaroxaban (Xarelto). Code Status: Level 3 - DNAR and DNI   Discussion with family: Updated  (daughter) at bedside. Anticipated Length of Stay: Patient will be admitted on an observation basis with an anticipated length of stay of less than 2 midnights secondary to snycope. Total Time Spent on Date of Encounter in care of patient: 75 minutes This time was spent on one or more of the following: performing physical exam; counseling and coordination of care; obtaining or reviewing history; documenting in the medical record; reviewing/ordering tests, medications or procedures; communicating with other healthcare professionals and discussing with patient's family/caregivers. Chief Complaint: syncope    History of Present Illness:  Yelena Lanier is a 80 y.o. female with a PMH of HTN, prediabetes who presents with syncope. History obtained from patient/family, chart review and discussion with ED resident. She states that she passed out on Tues then again on Sunday, prodrome of feeling hot then briefly lost consciousness (seconds), both times occurred in AM around 9-10AM. Denies chest pain/palpitations leading up to this. She had no tongue bite/fall/traumatic injury. The episode on Sunday the family was made aware and checked her VS at home which all were within normal limits. Has been eating her usual diet/fluid intake. No recent changes in medications except for atorvastatin in-place of simvastatin. Review of Systems:  Review of Systems   Constitutional: Negative for appetite change, chills, fatigue and fever. Respiratory: Negative for shortness of breath. Cardiovascular: Negative for chest pain and palpitations.    Gastrointestinal: Negative for abdominal pain, diarrhea, nausea and vomiting. Neurological: Positive for syncope. Negative for speech difficulty, weakness and light-headedness. Past Medical and Surgical History:   Past Medical History:   Diagnosis Date   • Afib ([address])    • Colon polyp 4/19/2018    Formatting of this note might be different from the original. Colonoscopy 4/19/18   • Hyperlipidemia    • Hypertension    • Pre-diabetes 10/10/2022    A1c about 6.2 since 2018   • Stroke-like symptoms: vertigo and gait imbalance 9/22/2022   • Urge incontinence of urine 5/6/2016       Past Surgical History:   Procedure Laterality Date   • HYSTERECTOMY     • KNEE SURGERY Bilateral 2012       Meds/Allergies:  Prior to Admission medications    Medication Sig Start Date End Date Taking? Authorizing Provider   amLODIPine (NORVASC) 5 mg tablet Take 1 tablet (5 mg total) by mouth daily 7/14/23  Yes Bushra Cai MD   atorvastatin (LIPITOR) 10 mg tablet Take 1 tablet (10 mg total) by mouth daily 7/14/23  Yes Bushra Cai MD   cholecalciferol (VITAMIN D3) 400 units tablet Take 1 tablet by mouth daily   Yes Historical Provider, MD   cyanocobalamin (VITAMIN B-12) 100 mcg tablet Take by mouth   Yes Historical Provider, MD   ELDERBERRY PO Take by mouth   Yes Historical Provider, MD   FISH OIL-KRILL OIL PO Take by mouth   Yes Historical Provider, MD   hydrochlorothiazide (HYDRODIURIL) 25 mg tablet Take 1 tablet (25 mg total) by mouth daily 3/3/23  Yes Bushra Cai MD   metoprolol tartrate (LOPRESSOR) 100 mg tablet Take 1 tablet (100 mg total) by mouth 2 (two) times a day 3/3/23  Yes Bushra Cai MD   Multiple Vitamin (Daily Value Multivitamin) TABS Take by mouth   Yes Historical Provider, MD   rivaroxaban (XARELTO) 20 mg tablet Take 1 tablet (20 mg total) by mouth daily 3/3/23  Yes Bushra Cai MD   zinc gluconate 50 mg tablet Take 50 mg by mouth daily   Yes Historical Provider, MD     I have reviewed home medications with patient personally.     Allergies: Allergies   Allergen Reactions   • Lisinopril Hives     Other reaction(s): Angioedema   • Oxycodone-Acetaminophen Rash and GI Intolerance       Social History:  Marital Status: Unknown   Occupation:   Patient Pre-hospital Living Situation: Home  Patient Pre-hospital Level of Mobility: walks  Patient Pre-hospital Diet Restrictions:   Substance Use History:   Social History     Substance and Sexual Activity   Alcohol Use Never     Social History     Tobacco Use   Smoking Status Never   Smokeless Tobacco Never     Social History     Substance and Sexual Activity   Drug Use Never       Family History:  History reviewed. No pertinent family history. Physical Exam:     Vitals:   Blood Pressure: (!) 179/79 (08/14/23 2300)  Pulse: 59 (08/14/23 2332)  Temperature: 98.1 °F (36.7 °C) (08/14/23 2332)  Temp Source: Temporal (08/14/23 2332)  Respirations: 20 (08/14/23 2300)  Weight - Scale: 115 kg (252 lb 13.9 oz) (08/14/23 1837)  SpO2: 93 % (08/14/23 2332)    Physical Exam  Vitals and nursing note reviewed. Constitutional:       General: She is not in acute distress. Appearance: She is well-developed. HENT:      Head: Normocephalic and atraumatic. Eyes:      Conjunctiva/sclera: Conjunctivae normal.   Cardiovascular:      Rate and Rhythm: Normal rate and regular rhythm. Heart sounds: No murmur heard. Pulmonary:      Effort: Pulmonary effort is normal. No respiratory distress. Breath sounds: Normal breath sounds. Abdominal:      Palpations: Abdomen is soft. Tenderness: There is no abdominal tenderness. Musculoskeletal:         General: No swelling. Cervical back: Neck supple. Right lower leg: Edema (trace) present. Left lower leg: Edema (trace) present. Skin:     General: Skin is warm and dry. Capillary Refill: Capillary refill takes 2 to 3 seconds. Neurological:      Mental Status: She is alert and oriented to person, place, and time.    Psychiatric:         Mood and Affect: Mood normal.          Additional Data:     Lab Results:  Results from last 7 days   Lab Units 08/14/23  1930   WBC Thousand/uL 6.75   HEMOGLOBIN g/dL 12.5   HEMATOCRIT % 38.3   PLATELETS Thousands/uL 231   NEUTROS PCT % 68   LYMPHS PCT % 22   MONOS PCT % 8   EOS PCT % 2     Results from last 7 days   Lab Units 08/14/23  1930   SODIUM mmol/L 133*   POTASSIUM mmol/L 3.7   CHLORIDE mmol/L 97   CO2 mmol/L 28   BUN mg/dL 14   CREATININE mg/dL 0.78   ANION GAP mmol/L 8   CALCIUM mg/dL 9.3   ALBUMIN g/dL 3.9   TOTAL BILIRUBIN mg/dL 0.56   ALK PHOS U/L 57   ALT U/L 11   AST U/L 14   GLUCOSE RANDOM mg/dL 128                       Lines/Drains:  Invasive Devices     Peripheral Intravenous Line  Duration           Peripheral IV 08/14/23 Right Antecubital <1 day                    Imaging: No pertinent imaging reviewed. No orders to display       EKG and Other Studies Reviewed on Admission:   · EKG: Sinus Bradycardia. HR 55.    ** Please Note: This note has been constructed using a voice recognition system.  **

## 2023-08-15 NOTE — ED NOTES
Provider aware of pt's BP, per Neo Guillen PA-C pt ok to go to floor.       Jamaal Quan RN  08/14/23 0486

## 2023-08-15 NOTE — DISCHARGE INSTRUCTIONS
You were seen in the ED for syncope. Return to the ED for any worsening symptoms or new symptoms. Follow up with your primary care doctor as soon as possible.

## 2023-08-15 NOTE — PROGRESS NOTES
[facility]  Progress Note  Name: Naseem Patel  MRN: [id number]  Unit/Bed#: E4 -01 I Date of Admission: 8/14/2023   Date of Service: 8/15/2023 I Hospital Day: 0    Assessment/Plan   Neck strain  Assessment & Plan  Reports left sided neck discomfort, non-radiating, no focal neuro deficits. L side muscle spasm appreciated, limited ROM left side    Plan:  • Multimodal analgesia, tylenol/lidocaine patch, heat/ice PRN            Pre-diabetes  Assessment & Plan  A1c has been 6.2 since 2018, syncopal symptoms occurring in AM around breakfast     Plan:  • Monitor BG while inpatient for hypoglycemic events      Paroxysmal A-fib (HCC)  Assessment & Plan  Hx PAF, rate controlled on admission, NSR     Plan:  • EKG reviewed with NSR HR 50s  • Continue PTA metoprolol 100mg twice daily  • Anticoagulation: Xarelto 20mg daily      HTN (hypertension)  Assessment & Plan  Hypertensive on arrival, anxious    Plan:  • Trend BP once settled on floor   • Continue PTA amlodipine 5mg daily, HCTZ 25mg dialy, metoprolol tartrate 100mg twice daily with hold parameters       * Syncope, vasovagal  Assessment & Plan  Presenting tonight with episodes of syncope x2. One episode on last Tuesday then again Sunday. Prodrome of feeling hot/flushed then brief (seconds) loss of consciousness. Both times occurring in the AM (9-10). Denies chest pain/palpitations/weakness/changes in vision/dizziness/tongue bite/incontinence. No recent changes in medications. TTE 09/23/22 reviewed with LVEF 20% grade I diastolic dysfunction, AV moderately thickened/calcified velocity is normal    Plan:  • Unclear etiology, unlikely neurologic given no focal deficits/evidence of history of seizures. Possibly cardiac arrhythmogenic, recent TTE 6mo prior noted above. Orthostasis/volume depleted or drug induced are also possible. Lastly vasovagal/hypoglycemia as well.    • Continue antihypertensives but these may need titration   • Check orthostatic BP --negative  • Monitor AC/HS BG-- unremarkable  • Maintain telemetry overnight, may benefit from holter/Zio  • We will obtain CT head and echocardiogram given that no other findings thus far have elucidated underlying cause             VTE Pharmacologic Prophylaxis:   Pharmacologic: Rivaroxaban (Xarelto)  Mechanical VTE Prophylaxis in Place: Yes    Patient Centered Rounds: I have performed bedside rounds with nursing staff today. Discussions with Specialists or Other Care Team Provider:     Education and Discussions with Family / Patient: Discussed treatment plan with family and patient who agree with current plan; encouraged to ask questions and participate. Time Spent for Care: 45 minutes. More than 50% of total time spent on counseling and coordination of care as described above. Current Length of Stay: 0 day(s)    Current Patient Status: Inpatient   Certification Statement: The patient will continue to require additional inpatient hospital stay due to Work-up of syncope    Discharge Plan: To be determined    Code Status: Level 3 - DNAR and DNI      Subjective:   Patient seen and examined bedside, no acute distress or discomfort noted. All discussed with patient's daughter at bedside, will obtain echo and CT head. Objective:     Vitals:   Temp (24hrs), Av.9 °F (36.6 °C), Min:97.5 °F (36.4 °C), Max:98.1 °F (36.7 °C)    Temp:  [97.5 °F (36.4 °C)-98.1 °F (36.7 °C)] 97.5 °F (36.4 °C)  HR:  [54-71] 67  Resp:  [18-20] 18  BP: (122-192)/(60-83) 142/60  SpO2:  [92 %-96 %] 96 %  Body mass index is 44.48 kg/m². Input and Output Summary (last 24 hours):     No intake or output data in the 24 hours ending 08/15/23 1912    Physical Exam:     Physical Exam  Vitals and nursing note reviewed. Constitutional:       General: She is not in acute distress. Appearance: She is well-developed. HENT:      Head: Normocephalic and atraumatic.    Eyes:      Conjunctiva/sclera: Conjunctivae normal.   Cardiovascular:      Rate and Rhythm: Normal rate and regular rhythm. Heart sounds: No murmur heard. Pulmonary:      Effort: Pulmonary effort is normal. No respiratory distress. Breath sounds: Normal breath sounds. Abdominal:      Palpations: Abdomen is soft. Tenderness: There is no abdominal tenderness. Musculoskeletal:         General: No swelling. Cervical back: Neck supple. Skin:     General: Skin is warm and dry. Neurological:      Mental Status: She is alert and oriented to person, place, and time. Psychiatric:         Mood and Affect: Mood normal.           Additional Data:     Labs:    Results from last 7 days   Lab Units 08/15/23  0434 08/14/23  1930   WBC Thousand/uL 6.58 6.75   HEMOGLOBIN g/dL 14.0 12.5   HEMATOCRIT % 41.9 38.3   PLATELETS Thousands/uL 251 231   NEUTROS PCT %  --  68   LYMPHS PCT %  --  22   MONOS PCT %  --  8   EOS PCT %  --  2     Results from last 7 days   Lab Units 08/15/23  0434 08/14/23  1930   SODIUM mmol/L 134* 133*   POTASSIUM mmol/L 3.5 3.7   CHLORIDE mmol/L 99 97   CO2 mmol/L 26 28   BUN mg/dL 11 14   CREATININE mg/dL 0.64 0.78   ANION GAP mmol/L 9 8   CALCIUM mg/dL 9.3 9.3   ALBUMIN g/dL  --  3.9   TOTAL BILIRUBIN mg/dL  --  0.56   ALK PHOS U/L  --  57   ALT U/L  --  11   AST U/L  --  14   GLUCOSE RANDOM mg/dL 106 128         Results from last 7 days   Lab Units 08/15/23  1610 08/15/23  1112 08/15/23  0720   POC GLUCOSE mg/dl 120 104 113                   * I Have Reviewed All Lab Data Listed Above. * Additional Pertinent Lab Tests Reviewed:  All Labs Within Last 24 Hours Reviewed    Imaging:    Imaging Reports Reviewed Today Include:   Imaging Personally Reviewed by Myself Includes:      Recent Cultures (last 7 days):           Last 24 Hours Medication List:   Current Facility-Administered Medications   Medication Dose Route Frequency Provider Last Rate   • acetaminophen  975 mg Oral Q8H PRN Krishna Wayne PA-C     • aluminum-magnesium hydroxide-simethicone  30 mL Oral Q6H PRN FEI SladeC     • amLODIPine  5 mg Oral Daily Frank Mathew PA-C     • atorvastatin  10 mg Oral Daily Frank Mathew PA-C     • cholecalciferol  400 Units Oral Daily Frank Mathew, PA-C     • cyanocobalamin  100 mcg Oral Daily Frank Mathew, PA-C     • hydrochlorothiazide  25 mg Oral Daily Frank Mathew PA-C     • lidocaine  1 patch Topical Daily Frank Mathew PA-C     • metoprolol tartrate  100 mg Oral BID FEI SladeC     • ondansetron  4 mg Intravenous Q6H PRN Frank Mathew PA-C     • polyethylene glycol  17 g Oral Daily PRN Frank Mathew PA-C     • rivaroxaban  20 mg Oral Daily Frank Mathew PA-C          Today, Patient Was Seen By: Neisha William MD    ** Please Note: Dictation voice to text software may have been used in the creation of this document.  **

## 2023-08-15 NOTE — OCCUPATIONAL THERAPY NOTE
Occupational Therapy Evaluation     Patient Name: aNm Headley  LJCFV'R Date: 8/15/2023  Problem List  Principal Problem:    Syncope, vasovagal  Active Problems:    HTN (hypertension)    Paroxysmal A-fib ([address])    Pre-diabetes    Neck strain    Past Medical History  Past Medical History:   Diagnosis Date    Afib ([address])     Colon polyp 4/19/2018    Formatting of this note might be different from the original. Colonoscopy 4/19/18    Hyperlipidemia     Hypertension     Pre-diabetes 10/10/2022    A1c about 6.2 since 2018    Stroke-like symptoms: vertigo and gait imbalance 9/22/2022    Urge incontinence of urine 5/6/2016     Past Surgical History  Past Surgical History:   Procedure Laterality Date    HYSTERECTOMY      KNEE SURGERY Bilateral 2012         08/15/23 0900   OT Last Visit   OT Visit Date 08/15/23   Note Type   Note type Evaluation   Pain Assessment   Pain Assessment Tool 0-10   Pain Score 1   Pain Location/Orientation Location: [facility] Pain Intervention(s) Repositioned; Ambulation/increased activity; Emotional support   Restrictions/Precautions   Weight Bearing Precautions Per Order No   Other Precautions Telemetry; Fall Risk;Pain   Home Living   Type of Home House  (1 FÉLIX c bar)   Home Layout One level   Bathroom Shower/Tub Walk-in shower   Bathroom Toilet Standard  (w/ Riser)   Bathroom Equipment Toilet raiser;Commode   Home Equipment Walker; Other (Comment)  (Rollator)   Prior Function   Level of [place] Independent with ADLs; Independent with functional mobility; Independent with IADLS;Needs assistance with IADLS   Lives With Alone   Receives Help From Family  (1 dghtr visits daily, other dghtr visits "often", son lives 1 block away; multiple grandchildren. Friendly neighborhood support.)   IADLs Independent with meal prep; Independent with medication management; Family/Friend/Other provides transportation   Falls in the last 6 months 1 to 4   Vocational Retired   Lifestyle   Autonomy Prior to admission, was (I) with ADLs and required (A) with IADLs. Patient lives in a 1 story home, alone, with 1 FÉLIX. Walk in shower, standard toilet with riser. BSC, RW, and rollator. Local children whom visit daily/live close, as well as supportive neighbors. (-) , manages own meds, completes own cooking/cleaning. Reciprocal Relationships family   Intrinsic Gratification Watching TV   General   Additional Pertinent History Comorbidities affecting pt’s functional performance include a significant PMH of: HTN, a-fib, HLD, colon polyp, obesity, incontinence of urine. Family/Caregiver Present No   Subjective   Subjective "I would really like to get out into the chair"   ADL   Where Assessed Edge of bed   Eating Assistance [address] [facility]. 5  Supervision/Setup   LB Bathing Assistance 4  Minimal Assistance   [address] [facility] 5  Supervision/Setup   LB Dressing Assistance 4  [address]  5  Supervision/Setup   Bed Mobility   Supine to Sit 6  Modified independent   Additional items Increased time required;HOB elevated; Bedrails   Transfers   Sit to Stand 5  Supervision   Additional items Bedrails; Increased time required;Verbal cues   Stand to Sit 5  Supervision   Additional items Assist x 1; Armrests; Increased time required;Verbal cues; Other  (VCs for controlled descent)   Toilet transfer 5  Supervision   Additional items Increased time required;Verbal cues;Standard toilet; Other  (grab bar)   Functional Mobility   Functional Mobility   (SBA-CGA)   Additional Comments no AD   Balance   Static Sitting Good   Dynamic Sitting Fair   Static Standing Fair -   Dynamic Standing Fair -   Ambulatory Fair -   Activity Tolerance   Activity Tolerance Patient tolerated treatment well;Patient limited by fatigue   Medical Staff Made Aware RN, PT   Nurse Made Aware Yes   RUE Assessment   RUE Assessment WFL   LUE Assessment   LUE Assessment WFL   Hand Function   Gross Motor Coordination Functional   Fine Motor Coordination Functional   Sensation   Light Touch No apparent deficits   Proprioception   Proprioception No apparent deficits   Vision-Basic Assessment   Current Vision No visual deficits   Vision - Complex Assessment   Ocular Range of Motion Intact   Acuity Able to read clock/calendar on wall without difficulty   Perception   Inattention/Neglect Appears intact   Cognition   Overall Cognitive Status [facility]   Arousal/Participation Alert; Responsive; Cooperative   Attention Within functional limits   Orientation Level Oriented X4   Memory Within functional limits   Following Commands Follows all commands and directions without difficulty   Assessment   Limitation Decreased ADL status; Decreased UE strength;Decreased Safe judgement during ADL;Decreased endurance;Decreased self-care trans;Decreased high-level ADLs   Prognosis Good   Assessment Patient is a 80y.o. year old female seen for OT eval s/p admit to [facility] on 8/14/2023 with syncope, vasovagal, neck strain. Patient with active OT orders and activity orders for Up and OOB as tolerated . Personal factors affecting pt at time of IE include: difficulty performing ADLs and IADLs, difficulty with functional mobility/transfers. Upon evaluation, patient’s functional status as follows: eating: Independent, grooming: supervision , UB bathing: supervision , LB bathing: Mariana, UB dressing: supervision , LB dressing: Mariana, toileting: supervision ; functional transfers: supervision , bed mobility: Merle, functional mobility: CGA and supervision , sitting/standing tolerance: ~5 min without UE support - due to the following deficits impacting occupational performance: weakness, decreased strength , decreased balance, decreased activity tolerance, limited functional reach, increased pain, increased body habitus  and decreased cardiovascular endurance.  These impairments, as well at pt’s FÉLIX home environment, difficulty performing ADLs, difficulty performing IADLs, difficulty performing transfers/mobility, fall risk , functional decline , increased reliance on DME , access to transportation , advanced age and multiple admissions  limit pt’s ability to safely engage in all baseline areas of occupation. Patient would benefit from continued skilled OT therapy while in acute setting to address deficits as defined above and maximize (I) with ADLs and functional mobility. Occupational performance areas to address include: grooming, bathing/shower, toilet hygiene, dressing, medication management, health maintenance, functional mobility, cleaning, meal prep and money management. Based on the aforementioned OT evaluation, functional performance deficits, and assessments, pt has been identified as a high complexity evaluation. From OT standpoint, recommend home with [facility] PT upon D/C. OT will continue to follow pt 2-3x/wk to address the following goals to  w/in 10-14 days. Goals   Patient Goals to return home   LTG Time Frame 10-14   Plan   Treatment Interventions ADL retraining;Functional transfer training;UE strengthening/ROM; Endurance training;Patient/family training;Equipment evaluation/education; Compensatory technique education;Continued evaluation; Energy conservation; Activityengagement   Goal Expiration Date 23   OT Treatment Day 0   OT Frequency 2-3x/wk   Recommendation   OT Discharge Recommendation Home with home health rehabilitation  (vs OP PT pending progress)   Additional Comments  Rec pt utilize RW unless progresses w/ mobility prior to discharge - does own one at home.    AM-PAC Daily Activity Inpatient   Lower Body Dressing 3   Bathing 3   Toileting 3   Upper Body Dressing 4   Grooming 4   Eating 4   Daily Activity Raw Score 21   Daily Activity Standardized Score (Calc for Raw Score >=11) 44.27   AM-PAC Applied Cognition Inpatient   Following a Speech/Presentation 4   Understanding Ordinary Conversation 4   Taking Medications 4   Remembering Where Things Are Placed or Put Away 4   Remembering List of 4-5 Errands 4   Taking Care of Complicated Tasks 4   Applied Cognition Raw Score 24   Applied Cognition Standardized Score 62.21     Occupational Therapy goals: In 7-14 days:     1- Patient will verbalize and demonstrate use of energy conservation/deep breathing technique and work simplification skills during functional activity with no verbal cues. 2- Patient will verbalize and demonstrate good body mechanics and joint protection techniques during ADLs/IADLs with no verbal cues   3- Pt will complete bed mobility at a Mod I level w/ G balance/safety demonstrated to decrease caregiver assistance required   4- Patient will increase OOB/ sitting tolerance to 2-4 hours per day for increased participation in self care and leisure tasks with no s/s of exertion.    5-Patient will increase standing tolerance time to 5 minutes with unilateral UE support to complete sink level ADLs@ mod I level    6- Pt will improve functional transfers to Mod I on/off all surfaces using DME as needed w/ G balance/safety   7- Patient will complete UB ADLs with Merle utilizing appropriate DME/AE PRN   8- Patient will complete LB ADLs with Merle utilizing appropriate DME/AE PRN   9- Patient will complete toileting tasks with Merle with G hygiene/thoroughness utilizing appropriate DME/AE PRN   10- Pt will improve functional mobility during ADL/IADL/leisure tasks to Mod I using DME as needed w/ G balance/safety    11- Pt will be attentive 100% of the time during ongoing cognitive assessment w/ G participation to assist w/ safe d/c planning/recommendations   12- Pt will participate in simulated IADL management task to increase independence to Mod I w/ G safety and endurance   13- Pt will increase BUE strength by 1MM grade via AROM/AAROM/PROM exercises to increase independence in ADLs and transfers         Russell Whitney OTR/L

## 2023-08-15 NOTE — ASSESSMENT & PLAN NOTE
Hypertensive on arrival, anxious    Plan:  • Trend BP once settled on floor   • Continue PTA amlodipine 5mg daily, HCTZ 25mg dialy, metoprolol tartrate 100mg twice daily with hold parameters

## 2023-08-15 NOTE — PLAN OF CARE
Problem: MOBILITY - ADULT  Goal: Maintains/Returns to pre admission functional level  Description: INTERVENTIONS:  - Perform BMAT or MOVE assessment daily.   - Set and communicate daily mobility goal to care team and patient/family/caregiver. - Collaborate with rehabilitation services on mobility goals if consulted  - Perform Range of Motion 3 times a day. - Reposition patient every 2 hours.   - Dangle patient 3 times a day  - Stand patient 3 times a day  - Ambulate patient 3 times a day  - Out of bed to chair 3 times a day   - Out of bed for meals 3 times a day  - Out of bed for toileting  - Record patient progress and toleration of activity level   Outcome: Progressing     Problem: Prexisting or High Potential for Compromised Skin Integrity  Goal: Skin integrity is maintained or improved  Description: INTERVENTIONS:  - Identify patients at risk for skin breakdown  - Assess and monitor skin integrity  - Assess and monitor nutrition and hydration status  - Monitor labs   - Assess for incontinence   - Turn and reposition patient  - Assist with mobility/ambulation  - Relieve pressure over bony prominences  - Avoid friction and shearing  - Provide appropriate hygiene as needed including keeping skin clean and dry  - Evaluate need for skin moisturizer/barrier cream  - Collaborate with interdisciplinary team   - Patient/family teaching  - Consider wound care consult   Outcome: Progressing

## 2023-08-16 ENCOUNTER — TRANSITIONAL CARE MANAGEMENT (OUTPATIENT)
Dept: FAMILY MEDICINE CLINIC | Facility: CLINIC | Age: 88
End: 2023-08-16

## 2023-08-16 ENCOUNTER — APPOINTMENT (INPATIENT)
Dept: NON INVASIVE DIAGNOSTICS | Facility: HOSPITAL | Age: 88
End: 2023-08-16
Payer: COMMERCIAL

## 2023-08-16 VITALS
SYSTOLIC BLOOD PRESSURE: 151 MMHG | WEIGHT: 251 LBS | HEIGHT: 63 IN | RESPIRATION RATE: 18 BRPM | HEART RATE: 55 BPM | TEMPERATURE: 97.2 F | DIASTOLIC BLOOD PRESSURE: 84 MMHG | BODY MASS INDEX: 44.47 KG/M2 | OXYGEN SATURATION: 97 %

## 2023-08-16 LAB
ALBUMIN SERPL BCP-MCNC: 3.8 G/DL (ref 3.5–5)
ALP SERPL-CCNC: 54 U/L (ref 34–104)
ALT SERPL W P-5'-P-CCNC: 10 U/L (ref 7–52)
ANION GAP SERPL CALCULATED.3IONS-SCNC: 7 MMOL/L
AORTIC ROOT: 3.1 CM
APICAL FOUR CHAMBER EJECTION FRACTION: 56 %
ASCENDING AORTA: 4 CM
AST SERPL W P-5'-P-CCNC: 19 U/L (ref 13–39)
BASOPHILS # BLD AUTO: 0.01 THOUSANDS/ÂΜL (ref 0–0.1)
BASOPHILS NFR BLD AUTO: 0 % (ref 0–1)
BILIRUB SERPL-MCNC: 0.79 MG/DL (ref 0.2–1)
BUN SERPL-MCNC: 18 MG/DL (ref 5–25)
CALCIUM SERPL-MCNC: 9.1 MG/DL (ref 8.4–10.2)
CHLORIDE SERPL-SCNC: 97 MMOL/L (ref 96–108)
CO2 SERPL-SCNC: 28 MMOL/L (ref 21–32)
CREAT SERPL-MCNC: 0.67 MG/DL (ref 0.6–1.3)
E WAVE DECELERATION TIME: 214 MS
EOSINOPHIL # BLD AUTO: 0.12 THOUSAND/ÂΜL (ref 0–0.61)
EOSINOPHIL NFR BLD AUTO: 2 % (ref 0–6)
ERYTHROCYTE [DISTWIDTH] IN BLOOD BY AUTOMATED COUNT: 12.9 % (ref 11.6–15.1)
FRACTIONAL SHORTENING: 39 % (ref 28–44)
GFR SERPL CREATININE-BSD FRML MDRD: 77 ML/MIN/1.73SQ M
GLUCOSE SERPL-MCNC: 102 MG/DL (ref 65–140)
GLUCOSE SERPL-MCNC: 111 MG/DL (ref 65–140)
GLUCOSE SERPL-MCNC: 113 MG/DL (ref 65–140)
HCT VFR BLD AUTO: 39.8 % (ref 34.8–46.1)
HGB BLD-MCNC: 12.8 G/DL (ref 11.5–15.4)
IMM GRANULOCYTES # BLD AUTO: 0.02 THOUSAND/UL (ref 0–0.2)
IMM GRANULOCYTES NFR BLD AUTO: 0 % (ref 0–2)
INTERVENTRICULAR SEPTUM IN DIASTOLE (PARASTERNAL SHORT AXIS VIEW): 1.6 CM
INTERVENTRICULAR SEPTUM: 2 CM (ref 0.6–1.1)
LAAS-AP2: 9.7 CM2
LAAS-AP4: 21.6 CM2
LEFT ATRIUM AREA SYSTOLE SINGLE PLANE A4C: 17.7 CM2
LEFT ATRIUM SIZE: 3.3 CM
LEFT ATRIUM VOLUME (MOD BIPLANE): 47 ML
LEFT INTERNAL DIMENSION IN SYSTOLE: 1.9 CM (ref 2.1–4)
LEFT VENTRICULAR INTERNAL DIMENSION IN DIASTOLE: 3.1 CM (ref 3.5–6)
LEFT VENTRICULAR POSTERIOR WALL IN END DIASTOLE: 1.6 CM
LEFT VENTRICULAR STROKE VOLUME: 14 ML
LVSV (TEICH): 14 ML
LYMPHOCYTES # BLD AUTO: 1.3 THOUSANDS/ÂΜL (ref 0.6–4.47)
LYMPHOCYTES NFR BLD AUTO: 24 % (ref 14–44)
MAGNESIUM SERPL-MCNC: 2.1 MG/DL (ref 1.9–2.7)
MCH RBC QN AUTO: 28.4 PG (ref 26.8–34.3)
MCHC RBC AUTO-ENTMCNC: 32.2 G/DL (ref 31.4–37.4)
MCV RBC AUTO: 88 FL (ref 82–98)
MONOCYTES # BLD AUTO: 0.48 THOUSAND/ÂΜL (ref 0.17–1.22)
MONOCYTES NFR BLD AUTO: 9 % (ref 4–12)
MV E'TISSUE VEL-SEP: 5 CM/S
MV PEAK A VEL: 0.52 M/S
MV PEAK E VEL: 44 CM/S
MV STENOSIS PRESSURE HALF TIME: 62 MS
MV VALVE AREA P 1/2 METHOD: 3.55 CM2
NEUTROPHILS # BLD AUTO: 3.43 THOUSANDS/ÂΜL (ref 1.85–7.62)
NEUTS SEG NFR BLD AUTO: 65 % (ref 43–75)
NRBC BLD AUTO-RTO: 0 /100 WBCS
PHOSPHATE SERPL-MCNC: 3.5 MG/DL (ref 2.3–4.1)
PLATELET # BLD AUTO: 174 THOUSANDS/UL (ref 149–390)
PMV BLD AUTO: 10.7 FL (ref 8.9–12.7)
POTASSIUM SERPL-SCNC: 4.1 MMOL/L (ref 3.5–5.3)
PROT SERPL-MCNC: 6.5 G/DL (ref 6.4–8.4)
RBC # BLD AUTO: 4.51 MILLION/UL (ref 3.81–5.12)
RIGHT ATRIUM AREA SYSTOLE A4C: 12.5 CM2
RIGHT VENTRICLE ID DIMENSION: 4 CM
SL CV LEFT ATRIUM LENGTH A2C: 3.5 CM
SL CV LV EF: 55
SL CV PED ECHO LEFT VENTRICLE DIASTOLIC VOLUME (MOD BIPLANE) 2D: 26 ML
SL CV PED ECHO LEFT VENTRICLE SYSTOLIC VOLUME (MOD BIPLANE) 2D: 12 ML
SODIUM SERPL-SCNC: 132 MMOL/L (ref 135–147)
TRICUSPID ANNULAR PLANE SYSTOLIC EXCURSION: 1.7 CM
WBC # BLD AUTO: 5.36 THOUSAND/UL (ref 4.31–10.16)

## 2023-08-16 PROCEDURE — 85025 COMPLETE CBC W/AUTO DIFF WBC: CPT | Performed by: INTERNAL MEDICINE

## 2023-08-16 PROCEDURE — 83735 ASSAY OF MAGNESIUM: CPT | Performed by: INTERNAL MEDICINE

## 2023-08-16 PROCEDURE — 93306 TTE W/DOPPLER COMPLETE: CPT

## 2023-08-16 PROCEDURE — 84100 ASSAY OF PHOSPHORUS: CPT | Performed by: INTERNAL MEDICINE

## 2023-08-16 PROCEDURE — 80053 COMPREHEN METABOLIC PANEL: CPT | Performed by: INTERNAL MEDICINE

## 2023-08-16 PROCEDURE — 82948 REAGENT STRIP/BLOOD GLUCOSE: CPT

## 2023-08-16 PROCEDURE — 97163 PT EVAL HIGH COMPLEX 45 MIN: CPT

## 2023-08-16 PROCEDURE — 99239 HOSP IP/OBS DSCHRG MGMT >30: CPT | Performed by: INTERNAL MEDICINE

## 2023-08-16 PROCEDURE — 93306 TTE W/DOPPLER COMPLETE: CPT | Performed by: STUDENT IN AN ORGANIZED HEALTH CARE EDUCATION/TRAINING PROGRAM

## 2023-08-16 RX ADMIN — ATORVASTATIN CALCIUM 10 MG: 10 TABLET, FILM COATED ORAL at 09:36

## 2023-08-16 RX ADMIN — AMLODIPINE BESYLATE 5 MG: 5 TABLET ORAL at 09:36

## 2023-08-16 RX ADMIN — METOPROLOL TARTRATE 100 MG: 100 TABLET, FILM COATED ORAL at 09:36

## 2023-08-16 RX ADMIN — CHOLECALCIFEROL TAB 10 MCG (400 UNIT) 400 UNITS: 10 TAB at 09:37

## 2023-08-16 RX ADMIN — VITAM B12 100 MCG: 100 TAB at 09:41

## 2023-08-16 RX ADMIN — RIVAROXABAN 20 MG: 20 TABLET, FILM COATED ORAL at 09:36

## 2023-08-16 RX ADMIN — HYDROCHLOROTHIAZIDE 25 MG: 25 TABLET ORAL at 09:37

## 2023-08-16 NOTE — ASSESSMENT & PLAN NOTE
Presenting tonight with episodes of syncope x2. One episode on last Tuesday then again Sunday. Prodrome of feeling hot/flushed then brief (seconds) loss of consciousness. Both times occurring in the AM (9-10). Denies chest pain/palpitations/weakness/changes in vision/dizziness/tongue bite/incontinence. No recent changes in medications. TTE 09/23/22 reviewed with LVEF 63% grade I diastolic dysfunction, AV moderately thickened/calcified velocity is normal    Plan:  • Unclear etiology, unlikely neurologic given no focal deficits/evidence of history of seizures. Possibly cardiac arrhythmogenic, recent TTE 6mo prior noted above. Orthostasis/volume depleted or drug induced are also possible. Lastly vasovagal/hypoglycemia as well.    • Continue antihypertensives but these may need titration   • Check orthostatic BP --negative  • Monitor AC/HS BG-- unremarkable  • Maintain telemetry overnight, may benefit from holter/Zio  • We will obtain CT head and echocardiogram given that no other findings thus far have elucidated underlying cause

## 2023-08-16 NOTE — PHYSICAL THERAPY NOTE
PT EVALUATION 13:50-14:10    80 y.o.    [id number]    Syncope [R55]    Past Medical History:   Diagnosis Date    Afib ([address])     Colon polyp 4/19/2018    Formatting of this note might be different from the original. Colonoscopy 4/19/18    Hyperlipidemia     Hypertension     Pre-diabetes 10/10/2022    A1c about 6.2 since 2018    Stroke-like symptoms: vertigo and gait imbalance 9/22/2022    Urge incontinence of urine 5/6/2016         Past Surgical History:   Procedure Laterality Date    HYSTERECTOMY      KNEE SURGERY Bilateral 2012 08/16/23 1350   PT Last Visit   PT Visit Date 08/16/23   Note Type   Note type Evaluation   Pain Assessment   Pain Score No Pain   Restrictions/Precautions   Weight Bearing Precautions Per Order No   Other Precautions Telemetry; Fall Risk   Home Living   Type of [address] [place] [address]  One level   Bathroom Shower/Tub Walk-in shower   Bathroom Toilet Standard   Bathroom Equipment Toilet raiser;Commode   [address]; Other (Comment)   Additional Comments resides alone wiht local family support. 1 FÉLIX. Prior Function   Level of [place] Independent with ADLs; Independent with functional mobility; Needs assistance with IADLS   Lives With Alone   Receives Help From Family   IADLs Independent with meal prep; Independent with medication management; Family/Friend/Other provides transportation   Falls in the last 6 months 1 to 4   Vocational Retired   Comments independent without AD use. Does have RW and rollator, but not using. Notes need for shopping cart when going out with family. General   Additional Pertinent History Pt is 81 y/o female admitted with syncope. PT consulted. Activity as tolerated orders.    Family/Caregiver Present Yes   Cognition   Overall Cognitive Status WFL   Orientation Level Oriented X4   Memory Within functional limits   Following Commands Follows all commands and directions without difficulty   Comments Pleasant and cooperative. Subjective   Subjective Doing ok. Neck pain improved. Does admit RW helps with balance. RUE Assessment   RUE Assessment WFL   LUE Assessment   LUE Assessment WFL   RLE Assessment   RLE Assessment WFL   Strength RLE   RLE Overall Strength 4-/5   LLE Assessment   LLE Assessment WFL   Strength LLE   LLE Overall Strength 4-/5   Light Touch   RLE Light Touch Grossly intact   LLE Light Touch Grossly intact   Bed Mobility   Additional Comments seated OOB in chair   Transfers   Sit to Stand 5  Supervision   Additional items Increased time required;Armrests   Stand to Sit 5  Supervision   Additional items Increased time required;Verbal cues;Armrests   Additional Comments Increased time to complete transitions and steady prior to ambulation. Ambulation/Elevation   Gait pattern Improper Weight shift;Decreased foot clearance; Inconsistent mekhi; Short stride; Foward flexed   Gait Assistance 5  Supervision   Additional items Verbal cues   Assistive Device Rolling walker;None  (none-->RW)   Distance Amb without AD 50'x1, then additional 150 ft with RW support and S. Improved fluency, pace with use of RW support. Balance   Static Sitting Good   Dynamic Sitting Fair +   Static Standing Fair   Dynamic Standing Fair -   Ambulatory Fair -   Endurance Deficit   Endurance Deficit Yes   Endurance Deficit Description fatigue   Activity Tolerance   Activity Tolerance Patient tolerated treatment well   Medical Staff Made Aware yes  (nurse, Prince Cheema)   Nurse Made Aware yes   Assessment   Prognosis Good   Problem List Decreased strength;Decreased endurance; Impaired balance;Decreased mobility;Obesity   Assessment Bonilla Carlin is a 80 y.o. female with a PMH of HTN, prediabetes who presents with syncope. PT consulted. Activity as tolerated orders. Prior to admission resides alone in one story home. 1 FÉLIX. Ambulates without AD at baseline. Hx of one fall reported.   Currently presents with mild functional limitations related to decreased activity tolerance compared to baseline, decreased balance, impaired posture and locomotion with gait deviations. Without AD, gait slowed, flexed posture, decreased foot clearance and step length. Addition of RW improves fluency, pace, gait stability and upright posture. RW recommended for ambulation to optimize gait stability. May benefit from OPPT for higher level balance and conditioning. The patient's AM-PAC Basic Mobility Inpatient Short Form Raw Score is 23. A Raw score of greater than 16 suggests the patient may benefit from discharge to home. Please also refer to the recommendation of the Physical Therapist for safe discharge planning. Anticipate safe d/c to home with family support when medically stable. Will d/c PT and recommend restorative ambulation given pt functioning close to or at baseline. D/C PT. Goals   Patient Goals go home   Plan   Treatment/Interventions Functional transfer training; Endurance training;Patient/family training;Equipment eval/education; Bed mobility;Gait training; Compensatory technique education;Spoke to nursing   PT Frequency Other (Comment)  (PT eval and d/c to restorative program.)   Recommendation   PT Discharge Recommendation Home with outpatient rehabilitation   Equipment Recommended Walker  (RW-pt has)   AM-PAC Basic Mobility Inpatient   Turning in Flat Bed Without Bedrails 4   Lying on Back to Sitting on Edge of Flat Bed Without Bedrails 4   Moving Bed to Chair 4   Standing Up From Chair Using Arms 4   Walk in Room 4   Climb 3-5 Stairs With Railing 3   Basic Mobility Inpatient Raw Score 23   Basic Mobility Standardized Score 50.88   Highest Level Of Mobility   JH-HLM Goal 7: Walk 25 feet or more   JH-HLM Achieved 7: Walk 25 feet or more   End of Consult   Patient Position at End of Consult Bedside chair; All needs within reach     Hx/personal factors: FÉLIX home environment, difficulty performing IADLs, fall risk , and advanced age, comorbidities    Examination:decreased strength , decreased balance, gait deviations, increased body habitus , and impaired posture. Clinical: unpredictable Ongoing medical status, Decreased activity tolerance compared to baseline, More restrictive AD use than baseline, and Decline from PLOF. Ruth Howell      Complexity: high             Tee Faizan, PT

## 2023-08-16 NOTE — DISCHARGE SUMMARY
[facility]  Discharge- Louie Lyles 4/12/1933, 80 y.o. female MRN: [id number]  Unit/Bed#: E4 -01 Encounter: [id number]  Primary Care Provider: Roberto Montemayor MD   Date and time admitted to hospital: 8/14/2023  6:42 PM    Neck strain  Assessment & Plan  Reports left sided neck discomfort, non-radiating, no focal neuro deficits. L side muscle spasm appreciated, limited ROM left side    Plan:  • Multimodal analgesia, tylenol/lidocaine patch, heat/ice PRN            Pre-diabetes  Assessment & Plan  A1c has been 6.2 since 2018, syncopal symptoms occurring in AM around breakfast     Plan:  • Monitor BG while inpatient for hypoglycemic events      Paroxysmal A-fib (HCC)  Assessment & Plan  Hx PAF, rate controlled on admission, NSR     Plan:  • EKG reviewed with NSR HR 50s  • Continue PTA metoprolol 100mg twice daily  • Anticoagulation: Xarelto 20mg daily      HTN (hypertension)  Assessment & Plan  Hypertensive on arrival, anxious    Plan:  • Trend BP once settled on floor   • Continue PTA amlodipine 5mg daily, HCTZ 25mg dialy, metoprolol tartrate 100mg twice daily with hold parameters       * Syncope, vasovagal  Assessment & Plan  Presenting tonight with episodes of syncope x2. One episode on last Tuesday then again Sunday. Prodrome of feeling hot/flushed then brief (seconds) loss of consciousness. Both times occurring in the AM (9-10). Denies chest pain/palpitations/weakness/changes in vision/dizziness/tongue bite/incontinence. No recent changes in medications. TTE 09/23/22 reviewed with LVEF 24% grade I diastolic dysfunction, AV moderately thickened/calcified velocity is normal    Plan:  • Unclear etiology, unlikely neurologic given no focal deficits/evidence of history of seizures. Possibly cardiac arrhythmogenic, recent TTE 6mo prior noted above. Orthostasis/volume depleted or drug induced are also possible. Lastly vasovagal/hypoglycemia as well.    • Continue antihypertensives but these may need titration   • Check orthostatic BP --negative  • Monitor AC/HS BG-- unremarkable  • Maintain telemetry overnight, may benefit from holter/Zio  • We will obtain CT head and echocardiogram given that no other findings thus far have elucidated underlying cause      Morbid Obesity - a/e/b a BMI of 44.48 - requiring a cardiovascular diet - in the setting of a 79 yo with HTN, HLD, and syncope. Hyponatremia, POA - a/e/b low sodium levels (382>224>890) - in the setting of a 79 yo F admitted with syncope - requiring repeat BMP monitoring, I/Os, and placement of an IV in the right [facility].       Discharging Physician / Practitioner: Som Amador MD  PCP: Sita Turner MD  Admission Date:   Admission Orders (From admission, onward)     Ordered        08/15/23 1438  Inpatient Admission  Once            08/14/23 2208  Place in Observation  Once                      Discharge Date: 08/16/23    Medical Problems     Resolved Problems  Date Reviewed: 8/15/2023   None         Consultations During Hospital Stay:  · None    Procedures Performed:   · CT head  · Orthostatic vitals  · Echocardiogram    Significant Findings / Test Results:   · CT head-no acute findings  · Echocardiogram-EF 55% with grade 1 diastolic dysfunction, patient does not appear to be in acute decompensated heart failure    Incidental Findings:   · Hyponatremia    Test Results Pending at Discharge (will require follow up): · None     Outpatient Tests Requested:  · CBC/CMP  · Patient may benefit from Zio patch versus Holter monitor    Complications: None    Reason for Admission: Syncope    Hospital Course: As per HPI:    "Monica Mccoy is a 80 y.o. female with a PMH of HTN, prediabetes who presents with syncope. History obtained from patient/family, chart review and discussion with ED resident.  She states that she passed out on Tues then again on Sunday, prodrome of feeling hot then briefly lost consciousness (seconds), both times occurred in AM around 9-10AM. Denies chest pain/palpitations leading up to this. She had no tongue bite/fall/traumatic injury. The episode on Sunday the family was made aware and checked her VS at home which all were within normal limits. Has been eating her usual diet/fluid intake. No recent changes in medications except for atorvastatin in-place of simvastatin. "    Condition at Discharge: stable     Discharge Day Visit / Exam:     Subjective: Patient reports 2 episodes of syncope and was encouraged to seek medical attention by family. Work-up thus far has been negative with normal CT head, unrevealing echocardiogram, labs and vitals largely stable. Patient does report urinary frequency prior to admission as well as backache, denies similar symptoms at this time as well as any other urinary symptoms such as dysuria, frequency, hematuria, etc.  Consider intervascular volume depletion from possible UTI prior to admission contributing to symptoms. Patient instructed follow-up with primary care and drink plenty of fluids. Vitals: Blood Pressure: 151/84 (08/16/23 1200)  Pulse: 55 (08/16/23 1200)  Temperature: (!) 97.2 °F (36.2 °C) (08/16/23 0726)  Temp Source: Temporal (08/16/23 0726)  Respirations: 18 (08/16/23 0726)  Height: 5' 3" (160 cm) (08/16/23 1200)  Weight - Scale: 114 kg (251 lb) (08/16/23 1200)  SpO2: 97 % (08/16/23 1100)  Exam:   Physical Exam  Vitals and nursing note reviewed. Constitutional:       General: She is not in acute distress. Appearance: She is well-developed. HENT:      Head: Normocephalic and atraumatic. Eyes:      Conjunctiva/sclera: Conjunctivae normal.   Cardiovascular:      Rate and Rhythm: Normal rate and regular rhythm. Heart sounds: No murmur heard. Pulmonary:      Effort: Pulmonary effort is normal. No respiratory distress. Breath sounds: Normal breath sounds. Abdominal:      Palpations: Abdomen is soft. Tenderness:  There is no abdominal tenderness. Musculoskeletal:         General: No swelling. Cervical back: Neck supple. Skin:     General: Skin is warm and dry. Neurological:      Mental Status: She is alert and oriented to person, place, and time. Psychiatric:         Mood and Affect: Mood normal.         Discussion with Family: Discussed treatment plan with family and patient who agree with current plan; encouraged to ask questions and participate. Discharge instructions/Information to patient and family:   See after visit summary for information provided to patient and family. Provisions for Follow-Up Care:  See after visit summary for information related to follow-up care and any pertinent home health orders. Disposition:     Home    For Discharges to [facility] SNF:   · Not Applicable to this Patient - Not Applicable to this Patient    Planned Readmission: None     Discharge Statement:  I spent 45 minutes discharging the patient. This time was spent on the day of discharge. I had direct contact with the patient on the day of discharge. Greater than 50% of the total time was spent examining patient, answering all patient questions, arranging and discussing plan of care with patient as well as directly providing post-discharge instructions. Additional time then spent on discharge activities. Discharge Medications:  See after visit summary for reconciled discharge medications provided to patient and family.       ** Please Note: This note has been constructed using a voice recognition system **

## 2023-08-16 NOTE — NURSING NOTE
Pt and family verbalize understanding to discharge instructions and medications. All questions answered. Pt left with all belongings accompany by PCA and family to private vehicle.

## 2023-08-16 NOTE — PLAN OF CARE
Problem: MOBILITY - ADULT  Goal: Maintain or return to baseline ADL function  Description: INTERVENTIONS:  -  Assess patient's ability to carry out ADLs; assess patient's baseline for ADL function and identify physical deficits which impact ability to perform ADLs (bathing, care of mouth/teeth, toileting, grooming, dressing, etc.)  - Assess/evaluate cause of self-care deficits   - Assess range of motion  - Assess patient's mobility; develop plan if impaired  - Assess patient's need for assistive devices and provide as appropriate  - Encourage maximum independence but intervene and supervise when necessary  - Involve family in performance of ADLs  - Assess for home care needs following discharge   - Consider OT consult to assist with ADL evaluation and planning for discharge  - Provide patient education as appropriate  8/16/2023 1039 by Lin Staton RN  Outcome: Progressing  8/16/2023 1039 by Lin Staton RN  Outcome: Progressing  Goal: Maintains/Returns to pre admission functional level  Description: INTERVENTIONS:  - Perform BMAT or MOVE assessment daily.   - Set and communicate daily mobility goal to care team and patient/family/caregiver. - Collaborate with rehabilitation services on mobility goals if consulted  - Perform Range of Motion 3 times a day. - Reposition patient every 2 hours.   - Dangle patient 3 times a day  - Stand patient 3 times a day  - Ambulate patient 3 times a day  - Out of bed to chair 3 times a day   - Out of bed for meals 3 times a day  - Out of bed for toileting  - Record patient progress and toleration of activity level   8/16/2023 1039 by Lin Staton RN  Outcome: Progressing  8/16/2023 1039 by Lin Staton RN  Outcome: Progressing     Problem: Prexisting or High Potential for Compromised Skin Integrity  Goal: Skin integrity is maintained or improved  Description: INTERVENTIONS:  - Identify patients at risk for skin breakdown  - Assess and monitor skin integrity  - Assess and monitor nutrition and hydration status  - Monitor labs   - Assess for incontinence   - Turn and reposition patient  - Assist with mobility/ambulation  - Relieve pressure over bony prominences  - Avoid friction and shearing  - Provide appropriate hygiene as needed including keeping skin clean and dry  - Evaluate need for skin moisturizer/barrier cream  - Collaborate with interdisciplinary team   - Patient/family teaching  - Consider wound care consult   8/16/2023 1039 by Shannan Triana, RN  Outcome: Progressing  8/16/2023 1039 by Shannan Triana RN  Outcome: Progressing     Problem: DISCHARGE PLANNING  Goal: Discharge to home or other facility with appropriate resources  Description: INTERVENTIONS:  - Identify barriers to discharge w/patient and caregiver  - Arrange for needed discharge resources and transportation as appropriate  - Identify discharge learning needs (meds, wound care, etc.)  - Arrange for interpretive services to assist at discharge as needed  - Refer to Case Management Department for coordinating discharge planning if the patient needs post-hospital services based on physician/advanced practitioner order or complex needs related to functional status, cognitive ability, or social support system  Outcome: Progressing     Problem: Knowledge Deficit  Goal: Patient/family/caregiver demonstrates understanding of disease process, treatment plan, medications, and discharge instructions  Description: Complete learning assessment and assess knowledge base.   Interventions:  - Provide teaching at level of understanding  - Provide teaching via preferred learning methods  Outcome: Progressing

## 2023-08-17 NOTE — UTILIZATION REVIEW
NOTIFICATION OF ADMISSION DISCHARGE   This is a Notification of Discharge from [address] [facility]. Please be advised that this patient has been discharge from our facility. Below you will find the admission and discharge date and time including the patient’s disposition. UTILIZATION REVIEW CONTACT:  Nimo Cesar  Utilization   Network Utilization Review Department  Phone: [phone] x carefully listen to the prompts. All voicemails are confidential.  Email: [email or web address]     ADMISSION INFORMATION  PRESENTATION DATE: 8/14/2023  6:42 PM  OBERVATION ADMISSION DATE:   INPATIENT ADMISSION DATE: 8/15/23  2:38 PM   DISCHARGE DATE: 8/16/2023  4:32 PM   DISPOSITION:Home/Self Care    IMPORTANT INFORMATION:  Send all requests for admission clinical reviews, approved or denied determinations and any other requests to dedicated fax number below belonging to the campus where the patient is receiving treatment.  List of dedicated fax numbers:  [place] DENIALS (Administrative/Medical Necessity) [phone] [address] [facility] (Maternity/NICU/Pediatrics) [phone]   [facility] [phone]   [facility] [phone] [address] [facility] [phone]   [address] [place] [address] [phone]   [facility] [phone]   [address] [place] [address] [address] [place] [address] [phone]   [address] [place] [address] [phone] [address] [facility] [phone] [address] [facility] [address] [facility] [phone]

## 2023-08-18 ENCOUNTER — TELEPHONE (OUTPATIENT)
Dept: FAMILY MEDICINE CLINIC | Facility: CLINIC | Age: 88
End: 2023-08-18

## 2023-08-18 NOTE — TELEPHONE ENCOUNTER
I called and left a message for the patient to call our office and schedule a follow up from the hospital.

## 2023-08-23 NOTE — ED ATTENDING ATTESTATION
8/14/2023  IWendi MD, saw and evaluated the patient. I have discussed the patient with the resident/non-physician practitioner and agree with the resident's/non-physician practitioner's findings, Plan of Care, and MDM as documented in the resident's/non-physician practitioner's note, except where noted. All available labs and Radiology studies were reviewed. I was present for key portions of any procedure(s) performed by the resident/non-physician practitioner and I was immediately available to provide assistance. At this point I agree with the current assessment done in the Emergency Department. I have conducted an independent evaluation of this patient a history and physical is as follows:    ED Course     5year-old female here for evaluation of syncope. She states that over the past week or so she has had 2 episodes of syncope. Both times she was seated and had a preceding prodrome of warmth and lightheadedness. Yesterday she had an episode that occurred after she had had a meal.  She was about to get up from the table she felt warm/flushed and lost consciousness for a few seconds. She woke spontaneously and denies any symptoms that could be described as postictal.  Currently she is asymptomatic denying chest pain/shortness of breath, nausea/vomiting, changes in vision, or unilateral/focal neurologic deficits. Exam: Elderly female in no acute distress, GCS 15 nonfocal, sclera nonicteric conjunctive are normal, moist mucous membranes, regular rate and rhythm, clear to auscultation bilaterally, abdomen soft nondistended nontender, extremities nontender without significant edema, normal distal sensation and cap refill. Impression and plan: 66-year-old female with 2 episodes of syncope over the past 2 days. Episodes were apparently brief and self-limiting, patient did have preceding prodrome. Plan for typical cardiac work-up including EKG, troponin, cardiac monitoring.   Will check electrolytes and blood counts. Will reassess prior to disposition. Will likely recommend admission for monitoring and further evaluation and treatment.     Critical Care Time  Procedures

## 2023-10-10 ENCOUNTER — TELEPHONE (OUTPATIENT)
Dept: FAMILY MEDICINE CLINIC | Facility: CLINIC | Age: 88
End: 2023-10-10

## 2023-10-10 NOTE — TELEPHONE ENCOUNTER
Patient daughter called asking if you could refer her to cardiology as the patient's heart rate has been elevated around 118 along with fatigue

## 2023-10-18 ENCOUNTER — TELEPHONE (OUTPATIENT)
Dept: FAMILY MEDICINE CLINIC | Facility: CLINIC | Age: 88
End: 2023-10-18

## 2023-10-18 NOTE — TELEPHONE ENCOUNTER
I called patients daughter who left a message asking for an apt for her mother with Dr Dhiraj Jovel. I left a message that we have a couple of apts yet with Dr Dhiraj Jovel for 10/30.

## 2023-10-30 ENCOUNTER — OFFICE VISIT (OUTPATIENT)
Dept: FAMILY MEDICINE CLINIC | Facility: CLINIC | Age: 88
End: 2023-10-30
Payer: COMMERCIAL

## 2023-10-30 VITALS
SYSTOLIC BLOOD PRESSURE: 142 MMHG | TEMPERATURE: 97.5 F | WEIGHT: 253.4 LBS | HEIGHT: 63 IN | OXYGEN SATURATION: 97 % | DIASTOLIC BLOOD PRESSURE: 82 MMHG | BODY MASS INDEX: 44.9 KG/M2 | HEART RATE: 106 BPM

## 2023-10-30 DIAGNOSIS — I48.0 PAROXYSMAL A-FIB (HCC): ICD-10-CM

## 2023-10-30 DIAGNOSIS — R55 SYNCOPE, VASOVAGAL: ICD-10-CM

## 2023-10-30 DIAGNOSIS — E78.2 MIXED HYPERLIPIDEMIA: ICD-10-CM

## 2023-10-30 DIAGNOSIS — I10 PRIMARY HYPERTENSION: ICD-10-CM

## 2023-10-30 DIAGNOSIS — Z00.00 MEDICARE ANNUAL WELLNESS VISIT, SUBSEQUENT: Primary | ICD-10-CM

## 2023-10-30 DIAGNOSIS — E66.01 MORBID OBESITY (HCC): ICD-10-CM

## 2023-10-30 PROCEDURE — 1090F PRES/ABSN URINE INCON ASSESS: CPT | Performed by: FAMILY MEDICINE

## 2023-10-30 PROCEDURE — G0439 PPPS, SUBSEQ VISIT: HCPCS | Performed by: FAMILY MEDICINE

## 2023-10-30 PROCEDURE — 1160F RVW MEDS BY RX/DR IN RCRD: CPT | Performed by: FAMILY MEDICINE

## 2023-10-30 PROCEDURE — 1125F AMNT PAIN NOTED PAIN PRSNT: CPT | Performed by: FAMILY MEDICINE

## 2023-10-30 PROCEDURE — 1170F FXNL STATUS ASSESSED: CPT | Performed by: FAMILY MEDICINE

## 2023-10-30 PROCEDURE — 3725F SCREEN DEPRESSION PERFORMED: CPT | Performed by: FAMILY MEDICINE

## 2023-10-30 PROCEDURE — 3288F FALL RISK ASSESSMENT DOCD: CPT | Performed by: FAMILY MEDICINE

## 2023-10-30 PROCEDURE — 1159F MED LIST DOCD IN RCRD: CPT | Performed by: FAMILY MEDICINE

## 2023-10-30 PROCEDURE — 99214 OFFICE O/P EST MOD 30 MIN: CPT | Performed by: FAMILY MEDICINE

## 2023-10-30 NOTE — PROGRESS NOTES
Assessment and Plan:     Problem List Items Addressed This Visit    None  Visit Diagnoses       Medicare annual wellness visit, subsequent    -  Primary              Depression Screening and Follow-up Plan: Patient was screened for depression during today's encounter. They screened negative with a PHQ-2 score of 0. Preventive health issues were discussed with patient, and age appropriate screening tests were ordered as noted in patient's After Visit Summary. Personalized health advice and appropriate referrals for health education or preventive services given if needed, as noted in patient's After Visit Summary. History of Present Illness:     Patient presents for a Medicare Wellness Visit    HPI   Patient Care Team:  Max Kim MD as PCP - General (Family Medicine)  MD Rio Singh MD     Review of Systems:     Review of Systems     Problem List:     Patient Active Problem List   Diagnosis    HTN (hypertension)    Paroxysmal A-fib ([address])    Mixed hyperlipidemia    Colon polyp    Morbid obesity ([address])    Urge incontinence of urine    Pre-diabetes    Syncope, vasovagal    Neck strain      Past Medical and Surgical History:     Past Medical History:   Diagnosis Date    Afib ([address])     Colon polyp 4/19/2018    Formatting of this note might be different from the original. Colonoscopy 4/19/18    Hyperlipidemia     Hypertension     Pre-diabetes 10/10/2022    A1c about 6.2 since 2018    Stroke-like symptoms: vertigo and gait imbalance 9/22/2022    Urge incontinence of urine 5/6/2016     Past Surgical History:   Procedure Laterality Date    HYSTERECTOMY      KNEE SURGERY Bilateral 2012      Family History:     No family history on file.    Social History:     Social History     Socioeconomic History    Marital status: Unknown     Spouse name: None    Number of children: None    Years of education: None    Highest education level: None   Occupational History    None   Tobacco Use    Smoking status: Never    Smokeless tobacco: Never   Vaping Use    Vaping Use: Never used   Substance and Sexual Activity    Alcohol use: Never    Drug use: Never    Sexual activity: None   Other Topics Concern    None   Social History Narrative     since 2005 after 47 year marriage. Three children. Six grandchildren. Seven great grandchildren. Lives alone. [place] her own [place]. Drives. -has a 77-year-old sister, 80-year-old brother and a 77-year-old sister. Lost 1 sibling. Who  at 80. Social Determinants of Health     Financial Resource Strain: Unknown (10/10/2022)    Overall Financial Resource Strain (CARDIA)     Difficulty of Paying Living Expenses: Patient refused   Food Insecurity: No Food Insecurity (8/15/2023)    Hunger Vital Sign     Worried About Running Out of Food in the Last Year: Never true     Ran Out of Food in the Last Year: Never true   Transportation Needs: No Transportation Needs (8/15/2023)    PRAPARE - Transportation     Lack of Transportation (Medical): No     Lack of Transportation (Non-Medical):  No   Physical Activity: Not on file   Stress: Not on file   Social Connections: Not on file   Intimate Partner Violence: Not on file   Housing Stability: Low Risk  (8/15/2023)    Housing Stability Vital Sign     Unable to Pay for Housing in the Last Year: No     Number of Places Lived in the Last Year: 1     Unstable Housing in the Last Year: No      Medications and Allergies:     Current Outpatient Medications   Medication Sig Dispense Refill    amLODIPine (NORVASC) 5 mg tablet Take 1 tablet (5 mg total) by mouth daily 90 tablet 3    atorvastatin (LIPITOR) 10 mg tablet Take 1 tablet (10 mg total) by mouth daily 90 tablet 3    cholecalciferol (VITAMIN D3) 400 units tablet Take 1 tablet by mouth daily      cyanocobalamin (VITAMIN B-12) 100 mcg tablet Take by mouth      ELDERBERRY PO Take by mouth      FISH OIL-KRILL OIL PO Take by mouth hydrochlorothiazide (HYDRODIURIL) 25 mg tablet Take 1 tablet (25 mg total) by mouth daily 90 tablet 3    metoprolol tartrate (LOPRESSOR) 100 mg tablet Take 1 tablet (100 mg total) by mouth 2 (two) times a day 180 tablet 3    Multiple Vitamin (Daily Value Multivitamin) TABS Take by mouth      rivaroxaban (XARELTO) 20 mg tablet Take 1 tablet (20 mg total) by mouth daily 90 tablet 3    zinc gluconate 50 mg tablet Take 50 mg by mouth daily       No current facility-administered medications for this visit. Allergies   Allergen Reactions    Lisinopril Hives     Other reaction(s): Angioedema    Oxycodone-Acetaminophen Rash and GI Intolerance      Immunizations:     Immunization History   Administered Date(s) Administered    COVID-19 J&J (Bala) vaccine 0.5 mL 04/10/2021      Health Maintenance: There are no preventive care reminders to display for this patient. Topic Date Due    Pneumococcal Vaccine: 65+ Years (1 - PCV) Never done    COVID-19 Vaccine (2 - Booster for Bala series) 06/05/2021    Influenza Vaccine (1) Never done      Medicare Screening Tests and Risk Assessments:     Mi Hahn is here for her Subsequent Wellness visit. Health Risk Assessment:   Patient rates overall health as good. Patient feels that their physical health rating is same. Patient is satisfied with their life. Eyesight was rated as same. Hearing was rated as same. Patient feels that their emotional and mental health rating is much better. Patients states they are never, rarely angry. Patient states they are sometimes unusually tired/fatigued. Pain experienced in the last 7 days has been none. Patient states that she has experienced no weight loss or gain in last 6 months. Depression Screening:   PHQ-2 Score: 0      Fall Risk Screening:    In the past year, patient has experienced: history of falling in past year    Number of falls: 1  Injured during fall?: No    Feels unsteady when standing or walking?: No    Worried about falling?: No      Urinary Incontinence Screening:   Patient has leaked urine accidently in the last six months. Home Safety:  Patient does not have trouble with stairs inside or outside of their home. Patient has working smoke alarms and has working carbon monoxide detector. Home safety hazards include: none. Nutrition:   Current diet is Regular and Low Saturated Fat. Medications:   Patient is currently taking over-the-counter supplements. OTC medications include: see medication list. Patient is able to manage medications. Activities of Daily Living (ADLs)/Instrumental Activities of Daily Living (IADLs):   Walk and transfer into and out of bed and chair?: Yes  Dress and groom yourself?: Yes    Bathe or shower yourself?: Yes    Feed yourself? Yes  Do your laundry/housekeeping?: Yes  Manage your money, pay your bills and track your expenses?: Yes  Make your own meals?: Yes    Do your own shopping?: Yes    Previous Hospitalizations:   Any hospitalizations or ED visits within the last 12 months?: Yes    How many hospitalizations have you had in the last year?: 1-2    Advance Care Planning:   Living will: Yes    Durable POA for healthcare: Yes    Advanced directive: Yes      PREVENTIVE SCREENINGS      Cardiovascular Screening:    General: Screening Not Indicated and History Lipid Disorder      Diabetes Screening:     General: Screening Current      Colorectal Cancer Screening:     General: Screening Not Indicated      Cervical Cancer Screening:    General: Screening Not Indicated      Lung Cancer Screening:     General: Screening Not Indicated    Screening, Brief Intervention, and Referral to Treatment (SBIRT)    Screening  Typical number of drinks in a day: 0  Typical number of drinks in a week: 0  Interpretation: Low risk drinking behavior.     Single Item Drug Screening:  How often have you used an illegal drug (including marijuana) or a prescription medication for non-medical reasons in the past year? never    Single Item Drug Screen Score: 0  Interpretation: Negative screen for possible drug use disorder    No results found.      Physical Exam:     /82 (BP Location: Left arm, Patient Position: Sitting, Cuff Size: Standard)   Pulse (!) 106   Temp 97.5 °F (36.4 °C) (Temporal)   Ht 5' 3" (1.6 m)   Wt 115 kg (253 lb 6.4 oz)   SpO2 97%   BMI 44.89 kg/m²     Physical Exam     Endy Beltre MD

## 2023-10-30 NOTE — PATIENT INSTRUCTIONS
Medicare wellness exam is completed. Review of the events in August where she almost passed out a couple times. Work-up was negative. Blood pressure is well controlled. Continues on Xarelto to prevent stroke in case she would go into atrial fibrillation. Medications stay the same. Suggest flu shot and COVID booster. Medicare Preventive Visit Patient Instructions  Thank you for completing your Welcome to Medicare Visit or Medicare Annual Wellness Visit today. Your next wellness visit will be due in one year (10/30/2024). The screening/preventive services that you may require over the next 5-10 years are detailed below. Some tests may not apply to you based off risk factors and/or age. Screening tests ordered at today's visit but not completed yet may show as past due. Also, please note that scanned in results may not display below. Preventive Screenings:  Service Recommendations Previous Testing/Comments   Colorectal Cancer Screening  * Colonoscopy    * Fecal Occult Blood Test (FOBT)/Fecal Immunochemical Test (FIT)  * Fecal DNA/Cologuard Test  * Flexible Sigmoidoscopy Age: 43-73 years old   Colonoscopy: every 10 years (may be performed more frequently if at higher risk)  OR  FOBT/FIT: every 1 year  OR  Cologuard: every 3 years  OR  Sigmoidoscopy: every 5 years  Screening may be recommended earlier than age 39 if at higher risk for colorectal cancer. Also, an individualized decision between you and your healthcare provider will decide whether screening between the ages of 77-80 would be appropriate. Colonoscopy: Not on file  FOBT/FIT: Not on file  Cologuard: Not on file  Sigmoidoscopy: Not on file          Breast Cancer Screening Age: 36 years old  Frequency: every 1-2 years  Not required if history of left and right mastectomy Mammogram: Not on file        Cervical Cancer Screening Between the ages of 21-29, pap smear recommended once every 3 years.    Between the ages of 32-69, can perform pap smear with HPV co-testing every 5 years. Recommendations may differ for women with a history of total hysterectomy, cervical cancer, or abnormal pap smears in past. Pap Smear: Not on file        Hepatitis C Screening Once for adults born between 1945 and 1965  More frequently in patients at high risk for Hepatitis C Hep C Antibody: Not on file        Diabetes Screening 1-2 times per year if you're at risk for diabetes or have pre-diabetes Fasting glucose: 106 mg/dL (8/15/2023)  A1C: 6.2 % (9/23/2022)      Cholesterol Screening Once every 5 years if you don't have a lipid disorder. May order more often based on risk factors. Lipid panel: 09/23/2022          Other Preventive Screenings Covered by Medicare:  Abdominal Aortic Aneurysm (AAA) Screening: covered once if your at risk. You're considered to be at risk if you have a family history of AAA. Lung Cancer Screening: covers low dose CT scan once per year if you meet all of the following conditions: (1) Age 48-67; (2) No signs or symptoms of lung cancer; (3) Current smoker or have quit smoking within the last 15 years; (4) You have a tobacco smoking history of at least 20 pack years (packs per day multiplied by number of years you smoked); (5) You get a written order from a healthcare provider. Glaucoma Screening: covered annually if you're considered high risk: (1) You have diabetes OR (2) Family history of glaucoma OR (3)  aged 48 and older OR (3)  American aged 72 and older  Osteoporosis Screening: covered every 2 years if you meet one of the following conditions: (1) You're estrogen deficient and at risk for osteoporosis based off medical history and other findings; (2) Have a vertebral abnormality; (3) On glucocorticoid therapy for more than 3 months; (4) Have primary hyperparathyroidism; (5) On osteoporosis medications and need to assess response to drug therapy. Last bone density test (DXA Scan): Not on file.   HIV Screening: covered annually if you're between the age of 14-79. Also covered annually if you are younger than 13 and older than 72 with risk factors for HIV infection. For pregnant patients, it is covered up to 3 times per pregnancy. Immunizations:  Immunization Recommendations   Influenza Vaccine Annual influenza vaccination during flu season is recommended for all persons aged >= 6 months who do not have contraindications   Pneumococcal Vaccine   * Pneumococcal conjugate vaccine = PCV13 (Prevnar 13), PCV15 (Vaxneuvance), PCV20 (Prevnar 20)  * Pneumococcal polysaccharide vaccine = PPSV23 (Pneumovax) Adults 45-41 yo with certain risk factors or if 69+ yo  If never received any pneumonia vaccine: recommend Prevnar 20 (PCV20)  Give PCV20 if previously received 1 dose of PCV13 or PPSV23   Hepatitis B Vaccine 3 dose series if at intermediate or high risk (ex: diabetes, end stage renal disease, liver disease)   Respiratory syncytial virus (RSV) Vaccine - COVERED BY MEDICARE PART D  * RSVPreF3 (Arexvy) CDC recommends that adults 61years of age and older may receive a single dose of RSV vaccine using shared clinical decision-making (SCDM)   Tetanus (Td) Vaccine - COST NOT COVERED BY MEDICARE PART B Following completion of primary series, a booster dose should be given every 10 years to maintain immunity against tetanus. Td may also be given as tetanus wound prophylaxis. Tdap Vaccine - COST NOT COVERED BY MEDICARE PART B Recommended at least once for all adults. For pregnant patients, recommended with each pregnancy. Shingles Vaccine (Shingrix) - COST NOT COVERED BY MEDICARE PART B  2 shot series recommended in those 19 years and older who have or will have weakened immune systems or those 50 years and older     Health Maintenance Due:  There are no preventive care reminders to display for this patient.   Immunizations Due:      Topic Date Due    Pneumococcal Vaccine: 65+ Years (1 - PCV) Never done    COVID-19 Vaccine (2 - Booster for Bala series) 06/05/2021    Influenza Vaccine (1) Never done     Advance Directives   What are advance directives? Advance directives are legal documents that state your wishes and plans for medical care. These plans are made ahead of time in case you lose your ability to make decisions for yourself. Advance directives can apply to any medical decision, such as the treatments you want, and if you want to donate organs. What are the types of advance directives? There are many types of advance directives, and each state has rules about how to use them. You may choose a combination of any of the following:  Living will: This is a written record of the treatment you want. You can also choose which treatments you do not want, which to limit, and which to stop at a certain time. This includes surgery, medicine, IV fluid, and tube feedings. Durable power of  for healthcare [facility]): This is a written record that states who you want to make healthcare choices for you when you are unable to make them for yourself. This person, called a proxy, is usually a family member or a friend. You may choose more than 1 proxy. Do not resuscitate (DNR) order:  A DNR order is used in case your heart stops beating or you stop breathing. It is a request not to have certain forms of treatment, such as CPR. A DNR order may be included in other types of advance directives. Medical directive: This covers the care that you want if you are in a coma, near death, or unable to make decisions for yourself. You can list the treatments you want for each condition. Treatment may include pain medicine, surgery, blood transfusions, dialysis, IV or tube feedings, and a ventilator (breathing machine). Values history: This document has questions about your views, beliefs, and how you feel and think about life. This information can help others choose the care that you would choose. Why are advance directives important?   An advance directive helps you control your care. Although spoken wishes may be used, it is better to have your wishes written down. Spoken wishes can be misunderstood, or not followed. Treatments may be given even if you do not want them. An advance directive may make it easier for your family to make difficult choices about your care. Weight Management   Why it is important to manage your weight:  Being overweight increases your risk of health conditions such as heart disease, high blood pressure, type 2 diabetes, and certain types of cancer. It can also increase your risk for osteoarthritis, sleep apnea, and other respiratory problems. Aim for a slow, steady weight loss. Even a small amount of weight loss can lower your risk of health problems. How to lose weight safely:  A safe and healthy way to lose weight is to eat fewer calories and get regular exercise. You can lose up about 1 pound a week by decreasing the number of calories you eat by 500 calories each day. Healthy meal plan for weight management:  A healthy meal plan includes a variety of foods, contains fewer calories, and helps you stay healthy. A healthy meal plan includes the following:  Eat whole-grain foods more often. A healthy meal plan should contain fiber. Fiber is the part of grains, fruits, and vegetables that is not broken down by your body. Whole-grain foods are healthy and provide extra fiber in your diet. Some examples of whole-grain foods are whole-wheat breads and pastas, oatmeal, brown rice, and bulgur. Eat a variety of vegetables every day. Include dark, leafy greens such as spinach, kale, nayla greens, and mustard greens. Eat yellow and orange vegetables such as carrots, sweet potatoes, and winter squash. Eat a variety of fruits every day. Choose fresh or canned fruit (canned in its own juice or light syrup) instead of juice. Fruit juice has very little or no fiber. Eat low-fat dairy foods. Drink fat-free (skim) milk or 1% milk. Eat fat-free yogurt and low-fat cottage cheese. Try low-fat cheeses such as mozzarella and other reduced-fat cheeses. Choose meat and other protein foods that are low in fat. Choose beans or other legumes such as split peas or lentils. Choose fish, skinless poultry (chicken or turkey), or lean cuts of red meat (beef or pork). Before you cook meat or poultry, cut off any visible fat. Use less fat and oil. Try baking foods instead of frying them. Add less fat, such as margarine, sour cream, regular salad dressing and mayonnaise to foods. Eat fewer high-fat foods. Some examples of high-fat foods include french fries, doughnuts, ice cream, and cakes. Eat fewer sweets. Limit foods and drinks that are high in sugar. This includes candy, cookies, regular soda, and sweetened drinks. Exercise:  Exercise at least 30 minutes per day on most days of the week. Some examples of exercise include walking, biking, dancing, and swimming. You can also fit in more physical activity by taking the stairs instead of the elevator or parking farther away from stores. Ask your healthcare provider about the best exercise plan for you. © Copyright [employer] 2018 Information is for End User's use only and may not be sold, redistributed or otherwise used for commercial purposes.  All illustrations and images included in CareNotes® are the copyrighted property of [employer] or [address] [place] [address]

## 2023-10-30 NOTE — PROGRESS NOTES
Chief Complaint   Patient presents with    Medicare Wellness Visit    Follow-up     F/u from hospital        Hospitals in [place]   Here for Medicare wellness exam and follow-up of hypertension, atrial fibrillation, prediabetes and hyperlipidemia. Was hospitalized in mid August with 2 episodes of syncope. Work-up was unrevealing including CAT scan of head, blood work, vital signs, and echocardiogram.  Echo revealed 55% ejection fraction with normal wall motion. Review of particular shows of near syncope. She was talking to her granddaughter on the phone where all of a sudden her head dropped but it was only momentary. A couple days later, felt herself getting a hot and briefly dropped her head and quickly recovered. After that, she was taken to the hospital and admitted for the work-up. In the beginning of October, she was feeling tired and on an Apple Watch EKG, her heart rate was about 120 but in the sinus rhythm. Past Medical History:   Diagnosis Date    Afib [facility])     Colon polyp 2018    Formatting of this note might be different from the original. Colonoscopy 18    Hyperlipidemia     Hypertension     Pre-diabetes 10/10/2022    A1c about 6.2 since 2018    Stroke-like symptoms: vertigo and gait imbalance 2022    Urge incontinence of urine 2016        Past Surgical History:   Procedure Laterality Date    HYSTERECTOMY      KNEE SURGERY Bilateral        Social History     Tobacco Use    Smoking status: Never    Smokeless tobacco: Never   Substance Use Topics    Alcohol use: Never       Social History     Social History Narrative     since  after 47 year marriage. Three children. Six grandchildren. Seven great grandchildren. Lives alone. [place] her own [place]. Drives. -has a 20-year-old sister, 45-year-old brother and a 77-year-old sister. Lost 1 sibling. Who  at 80.         The following portions of the patient's history were reviewed and updated as appropriate: allergies, current medications, past family history, past medical history, past social history, past surgical history, and problem list.      Review of Systems       /82 (BP Location: Left arm, Patient Position: Sitting, Cuff Size: Standard)   Pulse (!) 106   Temp 97.5 °F (36.4 °C) (Temporal)   Ht 5' 3" (1.6 m)   Wt 115 kg (253 lb 6.4 oz)   SpO2 97%   BMI 44.89 kg/m²      Physical Exam   Appears well. No neck nodes. Lungs are clear. Heart reveals a rate of about 72 with some extra beats. When the Apple watch was put on her wrist, it revealed a sinus rhythm with a rate of about 80. No edema. Mood is upbeat. Affect appropriate. Depression Screening and Follow-up Plan: Patient was screened for depression during today's encounter. They screened negative with a PHQ-2 score of 0.              Current Outpatient Medications:     amLODIPine (NORVASC) 5 mg tablet, Take 1 tablet (5 mg total) by mouth daily, Disp: 90 tablet, Rfl: 3    atorvastatin (LIPITOR) 10 mg tablet, Take 1 tablet (10 mg total) by mouth daily, Disp: 90 tablet, Rfl: 3    cholecalciferol (VITAMIN D3) 400 units tablet, Take 1 tablet by mouth daily, Disp: , Rfl:     cyanocobalamin (VITAMIN B-12) 100 mcg tablet, Take by mouth, Disp: , Rfl:     ELDERBERRY PO, Take by mouth, Disp: , Rfl:     FISH OIL-KRILL OIL PO, Take by mouth, Disp: , Rfl:     hydrochlorothiazide (HYDRODIURIL) 25 mg tablet, Take 1 tablet (25 mg total) by mouth daily, Disp: 90 tablet, Rfl: 3    metoprolol tartrate (LOPRESSOR) 100 mg tablet, Take 1 tablet (100 mg total) by mouth 2 (two) times a day, Disp: 180 tablet, Rfl: 3    Multiple Vitamin (Daily Value Multivitamin) TABS, Take by mouth, Disp: , Rfl:     rivaroxaban (XARELTO) 20 mg tablet, Take 1 tablet (20 mg total) by mouth daily, Disp: 90 tablet, Rfl: 3    zinc gluconate 50 mg tablet, Take 50 mg by mouth daily, Disp: , Rfl:      No problem-specific Assessment & Plan notes found for this encounter. Diagnoses and all orders for this visit:    Medicare annual wellness visit, subsequent    Primary hypertension    Paroxysmal A-fib ([address])    Mixed hyperlipidemia    Syncope, vasovagal    Morbid obesity [facility])        Patient Instructions   Medicare wellness exam is completed. Review of the events in August where she almost passed out a couple times. Work-up was negative. Blood pressure is well controlled. Continues on Xarelto to prevent stroke in case she would go into atrial fibrillation. Medications stay the same. Suggest flu shot and COVID booster. Medicare Preventive Visit Patient Instructions  Thank you for completing your Welcome to Medicare Visit or Medicare Annual Wellness Visit today. Your next wellness visit will be due in one year (10/30/2024). The screening/preventive services that you may require over the next 5-10 years are detailed below. Some tests may not apply to you based off risk factors and/or age. Screening tests ordered at today's visit but not completed yet may show as past due. Also, please note that scanned in results may not display below. Preventive Screenings:  Service Recommendations Previous Testing/Comments   Colorectal Cancer Screening  * Colonoscopy    * Fecal Occult Blood Test (FOBT)/Fecal Immunochemical Test (FIT)  * Fecal DNA/Cologuard Test  * Flexible Sigmoidoscopy Age: 43-73 years old   Colonoscopy: every 10 years (may be performed more frequently if at higher risk)  OR  FOBT/FIT: every 1 year  OR  Cologuard: every 3 years  OR  Sigmoidoscopy: every 5 years  Screening may be recommended earlier than age 39 if at higher risk for colorectal cancer. Also, an individualized decision between you and your healthcare provider will decide whether screening between the ages of 77-80 would be appropriate.  Colonoscopy: Not on file  FOBT/FIT: Not on file  Cologuard: Not on file  Sigmoidoscopy: Not on file          Breast Cancer Screening Age: 40+ years old  Frequency: every 1-2 years  Not required if history of left and right mastectomy Mammogram: Not on file        Cervical Cancer Screening Between the ages of 21-29, pap smear recommended once every 3 years. Between the ages of 32-69, can perform pap smear with HPV co-testing every 5 years. Recommendations may differ for women with a history of total hysterectomy, cervical cancer, or abnormal pap smears in past. Pap Smear: Not on file        Hepatitis C Screening Once for adults born between 1945 and 1965  More frequently in patients at high risk for Hepatitis C Hep C Antibody: Not on file        Diabetes Screening 1-2 times per year if you're at risk for diabetes or have pre-diabetes Fasting glucose: 106 mg/dL (8/15/2023)  A1C: 6.2 % (9/23/2022)      Cholesterol Screening Once every 5 years if you don't have a lipid disorder. May order more often based on risk factors. Lipid panel: 09/23/2022          Other Preventive Screenings Covered by Medicare:  Abdominal Aortic Aneurysm (AAA) Screening: covered once if your at risk. You're considered to be at risk if you have a family history of AAA. Lung Cancer Screening: covers low dose CT scan once per year if you meet all of the following conditions: (1) Age 48-67; (2) No signs or symptoms of lung cancer; (3) Current smoker or have quit smoking within the last 15 years; (4) You have a tobacco smoking history of at least 20 pack years (packs per day multiplied by number of years you smoked); (5) You get a written order from a healthcare provider.   Glaucoma Screening: covered annually if you're considered high risk: (1) You have diabetes OR (2) Family history of glaucoma OR (3)  aged 48 and older OR (3)  American aged 72 and older  Osteoporosis Screening: covered every 2 years if you meet one of the following conditions: (1) You're estrogen deficient and at risk for osteoporosis based off medical history and other findings; (2) Have a vertebral abnormality; (3) On glucocorticoid therapy for more than 3 months; (4) Have primary hyperparathyroidism; (5) On osteoporosis medications and need to assess response to drug therapy. Last bone density test (DXA Scan): Not on file. HIV Screening: covered annually if you're between the age of 14-79. Also covered annually if you are younger than 13 and older than 72 with risk factors for HIV infection. For pregnant patients, it is covered up to 3 times per pregnancy. Immunizations:  Immunization Recommendations   Influenza Vaccine Annual influenza vaccination during flu season is recommended for all persons aged >= 6 months who do not have contraindications   Pneumococcal Vaccine   * Pneumococcal conjugate vaccine = PCV13 (Prevnar 13), PCV15 (Vaxneuvance), PCV20 (Prevnar 20)  * Pneumococcal polysaccharide vaccine = PPSV23 (Pneumovax) Adults 55-23 yo with certain risk factors or if 69+ yo  If never received any pneumonia vaccine: recommend Prevnar 20 (PCV20)  Give PCV20 if previously received 1 dose of PCV13 or PPSV23   Hepatitis B Vaccine 3 dose series if at intermediate or high risk (ex: diabetes, end stage renal disease, liver disease)   Respiratory syncytial virus (RSV) Vaccine - COVERED BY MEDICARE PART D  * RSVPreF3 (Arexvy) CDC recommends that adults 61years of age and older may receive a single dose of RSV vaccine using shared clinical decision-making (SCDM)   Tetanus (Td) Vaccine - COST NOT COVERED BY MEDICARE PART B Following completion of primary series, a booster dose should be given every 10 years to maintain immunity against tetanus. Td may also be given as tetanus wound prophylaxis. Tdap Vaccine - COST NOT COVERED BY MEDICARE PART B Recommended at least once for all adults. For pregnant patients, recommended with each pregnancy.    Shingles Vaccine (Shingrix) - COST NOT COVERED BY MEDICARE PART B  2 shot series recommended in those 19 years and older who have or will have weakened immune systems or those 50 years and older     Health Maintenance Due:  There are no preventive care reminders to display for this patient. Immunizations Due:      Topic Date Due    Pneumococcal Vaccine: 65+ Years (1 - PCV) Never done    COVID-19 Vaccine (2 - Booster for Bala series) 06/05/2021    Influenza Vaccine (1) Never done     Advance Directives   What are advance directives? Advance directives are legal documents that state your wishes and plans for medical care. These plans are made ahead of time in case you lose your ability to make decisions for yourself. Advance directives can apply to any medical decision, such as the treatments you want, and if you want to donate organs. What are the types of advance directives? There are many types of advance directives, and each state has rules about how to use them. You may choose a combination of any of the following:  Living will: This is a written record of the treatment you want. You can also choose which treatments you do not want, which to limit, and which to stop at a certain time. This includes surgery, medicine, IV fluid, and tube feedings. Durable power of  for healthcare [facility]): This is a written record that states who you want to make healthcare choices for you when you are unable to make them for yourself. This person, called a proxy, is usually a family member or a friend. You may choose more than 1 proxy. Do not resuscitate (DNR) order:  A DNR order is used in case your heart stops beating or you stop breathing. It is a request not to have certain forms of treatment, such as CPR. A DNR order may be included in other types of advance directives. Medical directive: This covers the care that you want if you are in a coma, near death, or unable to make decisions for yourself. You can list the treatments you want for each condition.  Treatment may include pain medicine, surgery, blood transfusions, dialysis, IV or tube feedings, and a ventilator (breathing machine). Values history: This document has questions about your views, beliefs, and how you feel and think about life. This information can help others choose the care that you would choose. Why are advance directives important? An advance directive helps you control your care. Although spoken wishes may be used, it is better to have your wishes written down. Spoken wishes can be misunderstood, or not followed. Treatments may be given even if you do not want them. An advance directive may make it easier for your family to make difficult choices about your care. Weight Management   Why it is important to manage your weight:  Being overweight increases your risk of health conditions such as heart disease, high blood pressure, type 2 diabetes, and certain types of cancer. It can also increase your risk for osteoarthritis, sleep apnea, and other respiratory problems. Aim for a slow, steady weight loss. Even a small amount of weight loss can lower your risk of health problems. How to lose weight safely:  A safe and healthy way to lose weight is to eat fewer calories and get regular exercise. You can lose up about 1 pound a week by decreasing the number of calories you eat by 500 calories each day. Healthy meal plan for weight management:  A healthy meal plan includes a variety of foods, contains fewer calories, and helps you stay healthy. A healthy meal plan includes the following:  Eat whole-grain foods more often. A healthy meal plan should contain fiber. Fiber is the part of grains, fruits, and vegetables that is not broken down by your body. Whole-grain foods are healthy and provide extra fiber in your diet. Some examples of whole-grain foods are whole-wheat breads and pastas, oatmeal, brown rice, and bulgur. Eat a variety of vegetables every day. Include dark, leafy greens such as spinach, kale, nayla greens, and mustard greens.  Eat yellow and orange vegetables such as carrots, sweet potatoes, and winter squash. Eat a variety of fruits every day. Choose fresh or canned fruit (canned in its own juice or light syrup) instead of juice. Fruit juice has very little or no fiber. Eat low-fat dairy foods. Drink fat-free (skim) milk or 1% milk. Eat fat-free yogurt and low-fat cottage cheese. Try low-fat cheeses such as mozzarella and other reduced-fat cheeses. Choose meat and other protein foods that are low in fat. Choose beans or other legumes such as split peas or lentils. Choose fish, skinless poultry (chicken or turkey), or lean cuts of red meat (beef or pork). Before you cook meat or poultry, cut off any visible fat. Use less fat and oil. Try baking foods instead of frying them. Add less fat, such as margarine, sour cream, regular salad dressing and mayonnaise to foods. Eat fewer high-fat foods. Some examples of high-fat foods include french fries, doughnuts, ice cream, and cakes. Eat fewer sweets. Limit foods and drinks that are high in sugar. This includes candy, cookies, regular soda, and sweetened drinks. Exercise:  Exercise at least 30 minutes per day on most days of the week. Some examples of exercise include walking, biking, dancing, and swimming. You can also fit in more physical activity by taking the stairs instead of the elevator or parking farther away from stores. Ask your healthcare provider about the best exercise plan for you. © Copyright [address] 2018 Information is for End User's use only and may not be sold, redistributed or otherwise used for commercial purposes.  All illustrations and images included in CareNotes® are the copyrighted property of [employer] or  [employer]

## 2023-10-31 NOTE — ED PROVIDER NOTES
History  Chief Complaint   Patient presents with    Dizziness     Pt reports woke up around 2am to go to bathroom and felt dizzy  Pt reports /83  Denies CP      81 YO female presents with dizziness overnight  Pt states she was up to use the bathroom at 0200, 7 hours PTA  [place] room spinning at that time and nearly fell, having to hold on to bars in the bathroom  Pt states this did continue, until 0700  Pt denies any weakness or numbness  She had no headache while dizzy but states she has a mild aching in the Left frontal head at time of physician evaluation  She has an associated upset stomach  Pt denies similar in the past, family states she does seem to have a mildly hoarse voice but no slurring of speech  Pt denies dizziness on physician evaluation  She was noted to have an elevated blood pressure this morning  Pt denies CP/SOB/F/C/D/C, no dysuria, burning on urination or blood in urine  History provided by:  Patient   used: No    Dizziness  Quality:  Room spinning  Severity:  Moderate  Onset quality:  Sudden  Duration:  7 hours  Timing:  Constant  Progression:  Resolved  Chronicity:  New  Relieved by:  Nothing  Worsened by:  Nothing  Ineffective treatments:  None tried  Associated symptoms: headaches    Associated symptoms: no chest pain, no diarrhea, no shortness of breath, no vomiting and no weakness        Prior to Admission Medications   Prescriptions Last Dose Informant Patient Reported? Taking?    amLODIPine (NORVASC) 10 mg tablet 9/22/2022 at Unknown time  Yes Yes   Sig: Take 10 mg by mouth daily   cholecalciferol (VITAMIN D3) 400 units tablet 9/21/2022 at Unknown time  Yes Yes   Sig: Take 1 tablet by mouth daily   hydrochlorothiazide (HYDRODIURIL) 25 mg tablet 9/22/2022 at Unknown time  Yes Yes   Sig: Take 25 mg by mouth daily   metoprolol tartrate (LOPRESSOR) 100 mg tablet 9/22/2022 at Unknown time  Yes Yes   Sig: Take 1 tablet by mouth 2 (two) times a day   rivaroxaban The right coronary artery was selectively engaged and injected. Multiple views of the injected vessel were taken. (XARELTO) 20 mg tablet 9/21/2022 at Unknown time  Yes Yes   Sig: Take 1 tablet by mouth daily   simvastatin (ZOCOR) 20 mg tablet 9/22/2022 at Unknown time  Yes Yes   Sig: Take 1 tablet by mouth daily   zinc gluconate 50 mg tablet 9/21/2022 at Unknown time  Yes Yes   Sig: Take 50 mg by mouth daily      Facility-Administered Medications: None       History reviewed  No pertinent past medical history  History reviewed  No pertinent surgical history  History reviewed  No pertinent family history  I have reviewed and agree with the history as documented  E-Cigarette/Vaping    E-Cigarette Use Never User      E-Cigarette/Vaping Substances     Social History     Tobacco Use    Smoking status: Never Smoker    Smokeless tobacco: Never Used   Vaping Use    Vaping Use: Never used   Substance Use Topics    Alcohol use: Never    Drug use: Never       Review of Systems   Constitutional: Negative for chills, fatigue and fever  HENT: Negative for dental problem  Eyes: Negative for visual disturbance  Respiratory: Negative for shortness of breath  Cardiovascular: Negative for chest pain  Gastrointestinal: Negative for abdominal pain, diarrhea and vomiting  Genitourinary: Negative for dysuria and frequency  Musculoskeletal: Negative for arthralgias  Skin: Negative for rash  Neurological: Positive for dizziness and headaches  Negative for weakness and light-headedness  Psychiatric/Behavioral: Negative for agitation, behavioral problems and confusion  All other systems reviewed and are negative  Physical Exam  Physical Exam  Vitals and nursing note reviewed  Constitutional:       Appearance: Normal appearance  HENT:      Head: Normocephalic and atraumatic  Eyes:      Extraocular Movements: Extraocular movements intact  Conjunctiva/sclera: Conjunctivae normal       Pupils: Pupils are equal, round, and reactive to light     Cardiovascular:      Rate and Rhythm: Normal rate and regular rhythm  Pulmonary:      Effort: Pulmonary effort is normal    Abdominal:      General: There is no distension  Musculoskeletal:         General: Normal range of motion  Cervical back: Normal range of motion  Skin:     Findings: No rash  Neurological:      General: No focal deficit present  Mental Status: She is alert and oriented to person, place, and time  Cranial Nerves: No cranial nerve deficit  Sensory: No sensory deficit  Motor: No weakness        Coordination: Coordination normal    Psychiatric:         Mood and Affect: Mood normal          Vital Signs  ED Triage Vitals   Temperature Pulse Respirations Blood Pressure SpO2   09/22/22 1212 09/22/22 0901 09/22/22 0901 09/22/22 0901 09/22/22 0901   97 7 °F (36 5 °C) 59 17 (!) 196/101 97 %      Temp Source Heart Rate Source Patient Position - Orthostatic VS BP Location FiO2 (%)   09/22/22 1212 09/22/22 0901 09/22/22 0901 09/22/22 0901 --   Oral Monitor Sitting Right arm       Pain Score       09/22/22 0939       No Pain           Vitals:    09/22/22 2030 09/22/22 2230 09/23/22 0030 09/23/22 0230   BP: 125/59 149/67 149/69 152/73   Pulse: 69 67 61 64   Patient Position - Orthostatic VS: Lying Lying           Visual Acuity  Visual Acuity    Flowsheet Row Most Recent Value   L Pupil Size (mm) 3   R Pupil Size (mm) 3   L Pupil Shape Round   R Pupil Shape Round          ED Medications  Medications   atorvastatin (LIPITOR) tablet 40 mg (40 mg Oral Given 9/22/22 1834)   aspirin chewable tablet 81 mg (has no administration in time range)   hydrALAZINE (APRESOLINE) injection 10 mg (10 mg Intravenous Given 9/22/22 1923)   rivaroxaban (XARELTO) tablet 20 mg (20 mg Oral Given 9/22/22 1923)   melatonin tablet 6 mg (6 mg Oral Given 9/22/22 2329)   acetaminophen (TYLENOL) tablet 650 mg (has no administration in time range)   sodium chloride 0 9 % bolus 500 mL (0 mL Intravenous Stopped 9/22/22 1206)       Diagnostic Studies  Results Reviewed Procedure Component Value Units Date/Time    Urine Microscopic [[id number]]  (Abnormal) Collected: 09/22/22 1126    Lab Status: Final result Specimen: Urine, Other Updated: 09/22/22 1434     RBC, UA 0-1 /hpf      WBC, UA 0-1 /hpf      Epithelial Cells Occasional /hpf      Bacteria, UA Innumerable /hpf     HS Troponin I 4hr [[id number]]  (Normal) Collected: 09/22/22 1337    Lab Status: Final result Specimen: Blood from Arm, Left Updated: 09/22/22 1406     hs TnI 4hr 9 ng/L      Delta 4hr hsTnI -1 ng/L     HS Troponin I 2hr [[id number]]  (Normal) Collected: 09/22/22 1206    Lab Status: Final result Specimen: Blood from Arm, Left Updated: 09/22/22 1312     hs TnI 2hr 9 ng/L      Delta 2hr hsTnI -1 ng/L     Urine Macroscopic, POC [[id number]]  (Abnormal) Collected: 09/22/22 1126    Lab Status: Final result Specimen: Urine Updated: 09/22/22 1129     Color, UA Yellow     Clarity, UA Clear     pH, UA 7 5     Leukocytes, UA Negative     Nitrite, UA Negative     Protein, UA Negative mg/dl      Glucose, UA Negative mg/dl      Ketones, UA Negative mg/dl      Urobilinogen, UA 0 2 E U /dl      Bilirubin, UA Negative     Occult Blood, UA Trace     Specific [place], UA 1 020    Narrative:      CLINITEK RESULT    Hepatic function panel [[id number]]  (Normal) Collected: 09/22/22 0949    Lab Status: Final result Specimen: Blood from Arm, Left Updated: 09/22/22 1049     Total Bilirubin 0 64 mg/dL      Bilirubin, Direct 0 13 mg/dL      Alkaline Phosphatase 81 U/L      AST 16 U/L      ALT 21 U/L      Total Protein 7 5 g/dL      Albumin 3 5 g/dL     TSH [[id number]]  (Normal) Collected: 09/22/22 0949    Lab Status: Final result Specimen: Blood from Arm, Left Updated: 09/22/22 1049     TSH 3RD GENERATON 1 656 uIU/mL     Narrative:      Patients undergoing fluorescein dye angiography may retain small amounts of fluorescein in the body for 48-72 hours post procedure  Samples containing fluorescein can produce falsely depressed TSH values   If the patient had this procedure,a specimen should be resubmitted post fluorescein clearance        Magnesium [[id number]]  (Normal) Collected: 09/22/22 0949    Lab Status: Final result Specimen: Blood from Arm, Left Updated: 09/22/22 1049     Magnesium 2 1 mg/dL     Protime-INR [[id number]]  (Abnormal) Collected: 09/22/22 0949    Lab Status: Final result Specimen: Blood from Arm, Left Updated: 09/22/22 1049     Protime 16 4 seconds      INR 1 33    APTT [[id number]]  (Abnormal) Collected: 09/22/22 0949    Lab Status: Final result Specimen: Blood from Arm, Left Updated: 09/22/22 1049     PTT 41 seconds     Basic metabolic panel [[id number]] Collected: 09/22/22 0949    Lab Status: Final result Specimen: Blood from Arm, Left Updated: 09/22/22 1040     Sodium 137 mmol/L      Potassium 4 0 mmol/L      Chloride 97 mmol/L      CO2 32 mmol/L      ANION GAP 8 mmol/L      BUN 13 mg/dL      Creatinine 0 77 mg/dL      Glucose 125 mg/dL      Calcium 9 3 mg/dL      eGFR 68 ml/min/1 73sq m     Narrative:      [place] guidelines for Chronic Kidney Disease (CKD):     Stage 1 with normal or high GFR (GFR > 90 mL/min/1 73 square meters)    Stage 2 Mild CKD (GFR = 60-89 mL/min/1 73 square meters)    Stage 3A Moderate CKD (GFR = 45-59 mL/min/1 73 square meters)    Stage 3B Moderate CKD (GFR = 30-44 mL/min/1 73 square meters)    Stage 4 Severe CKD (GFR = 15-29 mL/min/1 73 square meters)    Stage 5 End Stage CKD (GFR <15 mL/min/1 73 square meters)  Note: GFR calculation is accurate only with a steady state creatinine    HS Troponin 0hr (reflex protocol) [[id number]]  (Normal) Collected: 09/22/22 0949    Lab Status: Final result Specimen: Blood from Arm, Left Updated: 09/22/22 1031     hs TnI 0hr 10 ng/L     CBC and differential [[id number]] Collected: 09/22/22 0949    Lab Status: Final result Specimen: Blood from Arm, Left Updated: 09/22/22 1005     WBC 6 08 Thousand/uL      RBC 4 55 Million/uL      Hemoglobin 13 4 g/dL      Hematocrit 40 0 %      MCV 88 fL      MCH 29 5 pg      MCHC 33 5 g/dL      RDW 12 1 %      MPV 9 2 fL      Platelets 543 Thousands/uL      nRBC 0 /100 WBCs      Neutrophils Relative 70 %      Immat GRANS % 0 %      Lymphocytes Relative 21 %      Monocytes Relative 7 %      Eosinophils Relative 2 %      Basophils Relative 0 %      Neutrophils Absolute 4 26 Thousands/µL      Immature Grans Absolute 0 02 Thousand/uL      Lymphocytes Absolute 1 29 Thousands/µL      Monocytes Absolute 0 41 Thousand/µL      Eosinophils Absolute 0 10 Thousand/µL      Basophils Absolute 0 00 Thousands/µL                  CT head without contrast   Final Result by Leslee Chávez MD (09/22 1046)      No acute intracranial abnormality  Workstation performed: [id number]         XR chest 1 view portable   Final Result by Alina Santiago MD (09/22 3033)      No acute cardiopulmonary disease  Workstation performed: [id number]         MRI brain wo contrast    (Results Pending)              Procedures  ECG 12 Lead Documentation Only    Date/Time: 9/22/2022 9:52 AM  Performed by: Will Avina MD  Authorized by: Will Avina MD     ECG reviewed by me, the ED Provider: yes    Patient location:  ED  Interpretation:     Interpretation: normal    Rate:     ECG rate:  57    ECG rate assessment: bradycardic    Rhythm:     Rhythm: sinus rhythm and sinus bradycardia    QRS:     QRS axis:  Normal    QRS intervals:  Normal  Conduction:     Conduction: abnormal      Abnormal conduction: 1st degree    ST segments:     ST segments:  Normal  T waves:     T waves: normal               ED Course                                             MDM  Number of Diagnoses or Management Options  Dizziness: new and requires workup  Diagnosis management comments: 1   Dizziness - Pt with 5 hours of dizziness, no Hx of similar in the past  Will check CT of head, CBC for anemia, metabolic panel for electrolyte abnormalities and dehydration, CXR, ECG and troponin  Amount and/or Complexity of Data Reviewed  Clinical lab tests: ordered and reviewed  Tests in the radiology section of CPT®: ordered and reviewed  Obtain history from someone other than the patient: yes  Review and summarize past medical records: yes  Discuss the patient with other providers: yes  Independent visualization of images, tracings, or specimens: yes    Patient Progress  Patient progress: improved      Disposition  Final diagnoses:   Dizziness     Time reflects when diagnosis was documented in both MDM as applicable and the Disposition within this note     Time User Action Codes Description Comment    9/22/2022 11:39 AM Paulino WALLACE Add [R42] Dizziness     9/22/2022  5:09 PM James Yarbrough Add [R42] Vertigo       ED Disposition     ED Disposition   Admit    Condition   Stable    Date/Time   Thu Sep 22, 2022 11:40 AM    Comment   Case was discussed with GERALDO and the patient's admission status was agreed to be Admission Status: observation status to the service of Dr Ebonie Saldana   Follow-up Information    None         Current Discharge Medication List      CONTINUE these medications which have NOT CHANGED    Details   amLODIPine (NORVASC) 10 mg tablet Take 10 mg by mouth daily      cholecalciferol (VITAMIN D3) 400 units tablet Take 1 tablet by mouth daily      hydrochlorothiazide (HYDRODIURIL) 25 mg tablet Take 25 mg by mouth daily      metoprolol tartrate (LOPRESSOR) 100 mg tablet Take 1 tablet by mouth 2 (two) times a day      rivaroxaban (XARELTO) 20 mg tablet Take 1 tablet by mouth daily      simvastatin (ZOCOR) 20 mg tablet Take 1 tablet by mouth daily      zinc gluconate 50 mg tablet Take 50 mg by mouth daily             No discharge procedures on file      PDMP Review     None          ED Provider  Electronically Signed by           Lincoln Nelson MD  09/23/22 6057

## 2024-02-18 ENCOUNTER — RA CDI HCC (OUTPATIENT)
Dept: OTHER | Facility: HOSPITAL | Age: 89
End: 2024-02-18

## 2024-02-23 ENCOUNTER — TELEPHONE (OUTPATIENT)
Age: 89
End: 2024-02-23

## 2024-02-26 ENCOUNTER — TELEPHONE (OUTPATIENT)
Age: 89
End: 2024-02-26

## 2024-02-26 ENCOUNTER — OFFICE VISIT (OUTPATIENT)
Dept: FAMILY MEDICINE CLINIC | Facility: CLINIC | Age: 89
End: 2024-02-26
Payer: COMMERCIAL

## 2024-02-26 VITALS
DIASTOLIC BLOOD PRESSURE: 80 MMHG | WEIGHT: 253 LBS | HEIGHT: 63 IN | TEMPERATURE: 96 F | BODY MASS INDEX: 44.83 KG/M2 | HEART RATE: 72 BPM | OXYGEN SATURATION: 97 % | SYSTOLIC BLOOD PRESSURE: 128 MMHG

## 2024-02-26 DIAGNOSIS — I10 PRIMARY HYPERTENSION: ICD-10-CM

## 2024-02-26 DIAGNOSIS — I48.0 PAROXYSMAL A-FIB (HCC): ICD-10-CM

## 2024-02-26 DIAGNOSIS — I48.91 ATRIAL FIBRILLATION WITH RAPID VENTRICULAR RESPONSE (HCC): Primary | ICD-10-CM

## 2024-02-26 DIAGNOSIS — E78.2 MIXED HYPERLIPIDEMIA: ICD-10-CM

## 2024-02-26 DIAGNOSIS — E66.01 MORBID OBESITY (HCC): ICD-10-CM

## 2024-02-26 PROCEDURE — 99214 OFFICE O/P EST MOD 30 MIN: CPT | Performed by: FAMILY MEDICINE

## 2024-02-26 RX ORDER — DIGOXIN 125 MCG
TABLET ORAL
Qty: 94 TABLET | Refills: 3 | Status: SHIPPED | OUTPATIENT
Start: 2024-02-26

## 2024-02-26 RX ORDER — METOPROLOL TARTRATE 100 MG/1
100 TABLET ORAL 2 TIMES DAILY
Qty: 180 TABLET | Refills: 3 | Status: SHIPPED | OUTPATIENT
Start: 2024-02-26

## 2024-02-26 RX ORDER — AMLODIPINE BESYLATE 5 MG/1
5 TABLET ORAL DAILY
Qty: 90 TABLET | Refills: 3 | Status: SHIPPED | OUTPATIENT
Start: 2024-02-26

## 2024-02-26 RX ORDER — ATORVASTATIN CALCIUM 10 MG/1
10 TABLET, FILM COATED ORAL DAILY
Qty: 90 TABLET | Refills: 3 | Status: SHIPPED | OUTPATIENT
Start: 2024-02-26

## 2024-02-26 RX ORDER — HYDROCHLOROTHIAZIDE 25 MG/1
25 TABLET ORAL DAILY
Qty: 90 TABLET | Refills: 3 | Status: SHIPPED | OUTPATIENT
Start: 2024-02-26

## 2024-02-26 NOTE — TELEPHONE ENCOUNTER
Called pharmacy regarding clarification.  They stated prescription has been filled and taken care of.

## 2024-02-26 NOTE — PROGRESS NOTES
Chief Complaint   Patient presents with    Follow-up     Blood pressure this morning was 137/89 with a wrist cuff, and patient is concern about irregular heart beat        HPI   Here for  follow-up of hypertension, atrial fibrillation, prediabetes and hyperlipidemia.   Plans that her heart seems to go too fast at times.  Blood pressure on the wrist cuff at home is okay.  She is more short of breath and she has been.  Daughter notes that she has no energy many days.  Lungs lightheaded and dizzy.  Had a fall in .  Injury but unable to get up by herself. Had to slide on the floor to a phone.  She did have an ejection fraction of 55% on her 2D echo last August.        Past Medical History:   Diagnosis Date    Afib (HCC)     Colon polyp 2018    Formatting of this note might be different from the original. Colonoscopy 18    Hyperlipidemia     Hypertension     Pre-diabetes 10/10/2022    A1c about 6.2 since     Stroke-like symptoms: vertigo and gait imbalance 2022    Urge incontinence of urine 2016        Past Surgical History:   Procedure Laterality Date    HYSTERECTOMY      KNEE SURGERY Bilateral        Social History     Tobacco Use    Smoking status: Never    Smokeless tobacco: Never   Substance Use Topics    Alcohol use: Never       Social History     Social History Narrative     since  after 54 year marriage.      Three children.      Six grandchildren.  Seven great grandchildren.      Lives alone.  In her own house [place].  2 daughters live very close.    Drives.    -has a 92-year-old sister, 82-year-old brother and a 79-year-old sister.  Lost 1 sibling. Who  at 85.        The following portions of the patient's history were reviewed and updated as appropriate: allergies, current medications, past family history, past medical history, past social history, past surgical history, and problem list.      Review of Systems       /80 (BP Location: Left  "arm, Patient Position: Sitting, Cuff Size: Large)   Pulse 72   Temp (!) 96 °F (35.6 °C) (Temporal)   Ht 5' 3\" (1.6 m)   Wt 115 kg (253 lb)   SpO2 97%   BMI 44.82 kg/m²      Physical Exam   Appears well..  Lungs are clear.  Heart irregular irregular with a rate of about 100.  Abdomen soft and nontender.  Somewhat obese.  No edema.  Mood upbeat.  Affect appropriate.  Alert.              Current Outpatient Medications:     amLODIPine (NORVASC) 5 mg tablet, Take 1 tablet (5 mg total) by mouth daily, Disp: 90 tablet, Rfl: 3    atorvastatin (LIPITOR) 10 mg tablet, Take 1 tablet (10 mg total) by mouth daily, Disp: 90 tablet, Rfl: 3    cholecalciferol (VITAMIN D3) 400 units tablet, Take 1 tablet by mouth daily, Disp: , Rfl:     cyanocobalamin (VITAMIN B-12) 100 mcg tablet, Take by mouth, Disp: , Rfl:     digoxin (Digox) 0.125 mg tablet, Tablets daily for 4 days, then 1 tablet daily, Disp: 94 tablet, Rfl: 3    ELDERBERRY PO, Take by mouth, Disp: , Rfl:     FISH OIL-KRILL OIL PO, Take by mouth, Disp: , Rfl:     hydroCHLOROthiazide 25 mg tablet, Take 1 tablet (25 mg total) by mouth daily, Disp: 90 tablet, Rfl: 3    metoprolol tartrate (LOPRESSOR) 100 mg tablet, Take 1 tablet (100 mg total) by mouth 2 (two) times a day, Disp: 180 tablet, Rfl: 3    Multiple Vitamin (Daily Value Multivitamin) TABS, Take by mouth, Disp: , Rfl:     rivaroxaban (XARELTO) 20 mg tablet, Take 1 tablet (20 mg total) by mouth daily, Disp: 90 tablet, Rfl: 3    zinc gluconate 50 mg tablet, Take 50 mg by mouth daily, Disp: , Rfl:      No problem-specific Assessment & Plan notes found for this encounter.       Diagnoses and all orders for this visit:    Atrial fibrillation with rapid ventricular response (HCC)    Mixed hyperlipidemia  -     atorvastatin (LIPITOR) 10 mg tablet; Take 1 tablet (10 mg total) by mouth daily    Paroxysmal A-fib (HCC)  -     rivaroxaban (XARELTO) 20 mg tablet; Take 1 tablet (20 mg total) by mouth daily  -     Digoxin level; " Future  -     digoxin (Digox) 0.125 mg tablet; Tablets daily for 4 days, then 1 tablet daily    Primary hypertension  -     metoprolol tartrate (LOPRESSOR) 100 mg tablet; Take 1 tablet (100 mg total) by mouth 2 (two) times a day  -     hydroCHLOROthiazide 25 mg tablet; Take 1 tablet (25 mg total) by mouth daily  -     amLODIPine (NORVASC) 5 mg tablet; Take 1 tablet (5 mg total) by mouth daily  -     CBC and differential; Future  -     Comprehensive metabolic panel; Future    Morbid obesity (HCC)        Patient Instructions   Heart rate is too fast.  Begin digoxin 0.125 mg, 2 tablets a day for 4 days to load, then 1 tablet daily.  Digoxin level in 1 month along with a blood count and chemistry.  Other medications remain the same.  A medical alert necklace would be a great idea.  Recheck in 2 months.

## 2024-02-26 NOTE — PATIENT INSTRUCTIONS
Heart rate is too fast.  Begin digoxin 0.125 mg, 2 tablets a day for 4 days to load, then 1 tablet daily.  Digoxin level in 1 month along with a blood count and chemistry.  Other medications remain the same.  A medical alert necklace would be a great idea.  Recheck in 2 months.

## 2024-02-26 NOTE — TELEPHONE ENCOUNTER
Patient's daughter would like PCP to ask patient if she uses a pill organizer for her medication because she is concerned she misses pills here and there and might listen if it comes from a healthcare provider. She will be with at the appointment today. Written in appointment notes as well.

## 2024-02-26 NOTE — TELEPHONE ENCOUNTER
[facility] pharmacy called and stated they need clarification on directions for patients prescription of Digoxin. Please return pharmacy call with clarification.

## 2024-03-23 ENCOUNTER — HOSPITAL ENCOUNTER (INPATIENT)
Facility: HOSPITAL | Age: 89
LOS: 1 days | DRG: 309 | End: 2024-03-24
Attending: EMERGENCY MEDICINE | Admitting: INTERNAL MEDICINE
Payer: COMMERCIAL

## 2024-03-23 ENCOUNTER — APPOINTMENT (EMERGENCY)
Dept: RADIOLOGY | Facility: HOSPITAL | Age: 89
DRG: 309 | End: 2024-03-23
Payer: COMMERCIAL

## 2024-03-23 DIAGNOSIS — E87.1 HYPONATREMIA: ICD-10-CM

## 2024-03-23 DIAGNOSIS — R09.89 PULMONARY VASCULAR CONGESTION: Primary | ICD-10-CM

## 2024-03-23 DIAGNOSIS — I48.91 ATRIAL FIBRILLATION (HCC): ICD-10-CM

## 2024-03-23 PROBLEM — I44.7 LBBB (LEFT BUNDLE BRANCH BLOCK): Status: ACTIVE | Noted: 2024-03-23

## 2024-03-23 LAB
2HR DELTA HS TROPONIN: 1 NG/L
ALBUMIN SERPL BCP-MCNC: 3.8 G/DL (ref 3.5–5)
ALP SERPL-CCNC: 78 U/L (ref 34–104)
ALT SERPL W P-5'-P-CCNC: 15 U/L (ref 7–52)
ANION GAP SERPL CALCULATED.3IONS-SCNC: 7 MMOL/L (ref 4–13)
AST SERPL W P-5'-P-CCNC: 16 U/L (ref 13–39)
ATRIAL RATE: 75 BPM
BASOPHILS # BLD AUTO: 0.01 THOUSANDS/ÂΜL (ref 0–0.1)
BASOPHILS NFR BLD AUTO: 0 % (ref 0–1)
BILIRUB SERPL-MCNC: 0.75 MG/DL (ref 0.2–1)
BNP SERPL-MCNC: 457 PG/ML (ref 0–100)
BUN SERPL-MCNC: 10 MG/DL (ref 5–25)
CALCIUM SERPL-MCNC: 9.2 MG/DL (ref 8.4–10.2)
CARDIAC TROPONIN I PNL SERPL HS: 14 NG/L
CARDIAC TROPONIN I PNL SERPL HS: 15 NG/L
CHLORIDE SERPL-SCNC: 92 MMOL/L (ref 96–108)
CO2 SERPL-SCNC: 28 MMOL/L (ref 21–32)
CREAT SERPL-MCNC: 0.78 MG/DL (ref 0.6–1.3)
DIGOXIN SERPL-MCNC: 0.6 NG/ML (ref 0.8–2)
EOSINOPHIL # BLD AUTO: 0.09 THOUSAND/ÂΜL (ref 0–0.61)
EOSINOPHIL NFR BLD AUTO: 1 % (ref 0–6)
ERYTHROCYTE [DISTWIDTH] IN BLOOD BY AUTOMATED COUNT: 12.6 % (ref 11.6–15.1)
GFR SERPL CREATININE-BSD FRML MDRD: 67 ML/MIN/1.73SQ M
GLUCOSE SERPL-MCNC: 169 MG/DL (ref 65–140)
HCT VFR BLD AUTO: 40.1 % (ref 34.8–46.1)
HGB BLD-MCNC: 13.5 G/DL (ref 11.5–15.4)
IMM GRANULOCYTES # BLD AUTO: 0.03 THOUSAND/UL (ref 0–0.2)
IMM GRANULOCYTES NFR BLD AUTO: 0 % (ref 0–2)
LYMPHOCYTES # BLD AUTO: 1.2 THOUSANDS/ÂΜL (ref 0.6–4.47)
LYMPHOCYTES NFR BLD AUTO: 16 % (ref 14–44)
MCH RBC QN AUTO: 28.7 PG (ref 26.8–34.3)
MCHC RBC AUTO-ENTMCNC: 33.7 G/DL (ref 31.4–37.4)
MCV RBC AUTO: 85 FL (ref 82–98)
MONOCYTES # BLD AUTO: 0.6 THOUSAND/ÂΜL (ref 0.17–1.22)
MONOCYTES NFR BLD AUTO: 8 % (ref 4–12)
NEUTROPHILS # BLD AUTO: 5.64 THOUSANDS/ÂΜL (ref 1.85–7.62)
NEUTS SEG NFR BLD AUTO: 75 % (ref 43–75)
NRBC BLD AUTO-RTO: 0 /100 WBCS
PLATELET # BLD AUTO: 252 THOUSANDS/UL (ref 149–390)
PMV BLD AUTO: 9.1 FL (ref 8.9–12.7)
POTASSIUM SERPL-SCNC: 3.5 MMOL/L (ref 3.5–5.3)
PROT SERPL-MCNC: 6.4 G/DL (ref 6.4–8.4)
QRS AXIS: -47 DEGREES
QRSD INTERVAL: 156 MS
QT INTERVAL: 384 MS
QTC INTERVAL: 497 MS
RBC # BLD AUTO: 4.7 MILLION/UL (ref 3.81–5.12)
SODIUM SERPL-SCNC: 127 MMOL/L (ref 135–147)
T WAVE AXIS: 79 DEGREES
VENTRICULAR RATE: 101 BPM
WBC # BLD AUTO: 7.57 THOUSAND/UL (ref 4.31–10.16)

## 2024-03-23 PROCEDURE — 83880 ASSAY OF NATRIURETIC PEPTIDE: CPT | Performed by: EMERGENCY MEDICINE

## 2024-03-23 PROCEDURE — 80053 COMPREHEN METABOLIC PANEL: CPT | Performed by: EMERGENCY MEDICINE

## 2024-03-23 PROCEDURE — 71045 X-RAY EXAM CHEST 1 VIEW: CPT

## 2024-03-23 PROCEDURE — 99223 1ST HOSP IP/OBS HIGH 75: CPT | Performed by: INTERNAL MEDICINE

## 2024-03-23 PROCEDURE — 36415 COLL VENOUS BLD VENIPUNCTURE: CPT | Performed by: EMERGENCY MEDICINE

## 2024-03-23 PROCEDURE — 93010 ELECTROCARDIOGRAM REPORT: CPT | Performed by: STUDENT IN AN ORGANIZED HEALTH CARE EDUCATION/TRAINING PROGRAM

## 2024-03-23 PROCEDURE — 96374 THER/PROPH/DIAG INJ IV PUSH: CPT

## 2024-03-23 PROCEDURE — 93005 ELECTROCARDIOGRAM TRACING: CPT

## 2024-03-23 PROCEDURE — 84484 ASSAY OF TROPONIN QUANT: CPT | Performed by: EMERGENCY MEDICINE

## 2024-03-23 PROCEDURE — 80162 ASSAY OF DIGOXIN TOTAL: CPT | Performed by: EMERGENCY MEDICINE

## 2024-03-23 PROCEDURE — 99285 EMERGENCY DEPT VISIT HI MDM: CPT | Performed by: EMERGENCY MEDICINE

## 2024-03-23 PROCEDURE — 85025 COMPLETE CBC W/AUTO DIFF WBC: CPT | Performed by: EMERGENCY MEDICINE

## 2024-03-23 PROCEDURE — 99285 EMERGENCY DEPT VISIT HI MDM: CPT

## 2024-03-23 RX ORDER — ATORVASTATIN CALCIUM 10 MG/1
10 TABLET, FILM COATED ORAL
Status: DISCONTINUED | OUTPATIENT
Start: 2024-03-24 | End: 2024-03-24 | Stop reason: HOSPADM

## 2024-03-23 RX ORDER — DIPHENHYDRAMINE HCL 25 MG
25 TABLET ORAL EVERY 6 HOURS PRN
Status: DISCONTINUED | OUTPATIENT
Start: 2024-03-23 | End: 2024-03-24 | Stop reason: HOSPADM

## 2024-03-23 RX ORDER — FUROSEMIDE 10 MG/ML
20 INJECTION INTRAMUSCULAR; INTRAVENOUS ONCE
Status: COMPLETED | OUTPATIENT
Start: 2024-03-23 | End: 2024-03-23

## 2024-03-23 RX ORDER — DIGOXIN 125 MCG
125 TABLET ORAL DAILY
Status: DISCONTINUED | OUTPATIENT
Start: 2024-03-24 | End: 2024-03-24

## 2024-03-23 RX ORDER — DILTIAZEM HYDROCHLORIDE 5 MG/ML
15 INJECTION INTRAVENOUS EVERY 6 HOURS PRN
Status: DISCONTINUED | OUTPATIENT
Start: 2024-03-23 | End: 2024-03-24 | Stop reason: HOSPADM

## 2024-03-23 RX ORDER — METOPROLOL TARTRATE 100 MG/1
100 TABLET ORAL 2 TIMES DAILY
Status: DISCONTINUED | OUTPATIENT
Start: 2024-03-23 | End: 2024-03-24

## 2024-03-23 RX ORDER — FUROSEMIDE 10 MG/ML
20 INJECTION INTRAMUSCULAR; INTRAVENOUS DAILY
Status: DISCONTINUED | OUTPATIENT
Start: 2024-03-24 | End: 2024-03-24 | Stop reason: HOSPADM

## 2024-03-23 RX ORDER — LANOLIN ALCOHOL/MO/W.PET/CERES
3 CREAM (GRAM) TOPICAL
Status: DISCONTINUED | OUTPATIENT
Start: 2024-03-23 | End: 2024-03-24 | Stop reason: HOSPADM

## 2024-03-23 RX ADMIN — METOPROLOL TARTRATE 100 MG: 100 TABLET, FILM COATED ORAL at 18:45

## 2024-03-23 RX ADMIN — DIPHENHYDRAMINE HYDROCHLORIDE 25 MG: 25 TABLET ORAL at 23:59

## 2024-03-23 RX ADMIN — Medication 3 MG: at 21:21

## 2024-03-23 RX ADMIN — FUROSEMIDE 20 MG: 10 INJECTION, SOLUTION INTRAMUSCULAR; INTRAVENOUS at 15:11

## 2024-03-23 NOTE — ASSESSMENT & PLAN NOTE
New left bundle branch block compared to previous.  Unsure if this is rate related versus  persistent.  Repeat 2-hour EKG pending  Chest pain-free with normal troponin  Low suspicion for ACS  Echocardiogram ordered

## 2024-03-23 NOTE — ED PROVIDER NOTES
"History  Chief Complaint   Patient presents with    Syncope     Pt came in via EMS from home. Pt reports a near syncope episode while sitting at the table. Pt reports felt really dizzy and hot, some SOB. Denies any other symptoms.      89y F here for evaluation of multiple near syncopal episodes today.  Pt lives alone and reports she just had lunch and was sitting in her recliner when she became flushed and lightheaded and felt like she was going to pass out. Pt reports she remembers calling out (even though no one was there) and slumped to her right side and immediately felt better.  Daughter came over to drop something off and after a discussion w/ daughter/son-in-law, they decided to go to the hospital and after pt had another near syncopal episode, EMS was called rather than family driving her in.  Pt reports she became nauseated in the ambulance.    Currently just states \"my head doesn't feel right, I feel foggy\".  When asked about the near syncopal episodes, pt denies any assoc cp/pressure, palpitations, sob/godoy, n/v,diaphoresis.  Currently denies any cp/pressure, no palpitations, no sob/godoy, no n/v, no current LH.    Pt started on digoxin  by her PCP end of February for rapid afib and pt states \"my head hasn't felt right since then\".  Was supposed to have outpt labs done Monday to check her digoxin level and basic labs.      History provided by:  Patient, medical records and relative   used: No    Syncope      Prior to Admission Medications   Prescriptions Last Dose Informant Patient Reported? Taking?   ELDERBERRY PO  Self Yes No   Sig: Take by mouth   FISH OIL-KRILL OIL PO  Self Yes No   Sig: Take by mouth   Multiple Vitamin (Daily Value Multivitamin) TABS  Self Yes Yes   Sig: Take by mouth   amLODIPine (NORVASC) 5 mg tablet   No Yes   Sig: Take 1 tablet (5 mg total) by mouth daily   atorvastatin (LIPITOR) 10 mg tablet   No Yes   Sig: Take 1 tablet (10 mg total) by mouth daily "   cholecalciferol (VITAMIN D3) 400 units tablet  Self Yes Yes   Sig: Take 1 tablet by mouth daily   cyanocobalamin (VITAMIN B-12) 100 mcg tablet  Self Yes Yes   Sig: Take by mouth   digoxin (Digox) 0.125 mg tablet   No Yes   Sig: Tablets daily for 4 days, then 1 tablet daily   hydroCHLOROthiazide 25 mg tablet   No Yes   Sig: Take 1 tablet (25 mg total) by mouth daily   metoprolol tartrate (LOPRESSOR) 100 mg tablet   No Yes   Sig: Take 1 tablet (100 mg total) by mouth 2 (two) times a day   rivaroxaban (XARELTO) 20 mg tablet   No Yes   Sig: Take 1 tablet (20 mg total) by mouth daily   zinc gluconate 50 mg tablet  Self Yes No   Sig: Take 50 mg by mouth daily      Facility-Administered Medications: None       Past Medical History:   Diagnosis Date    Afib (HCC)     Colon polyp 4/19/2018    Formatting of this note might be different from the original. Colonoscopy 4/19/18    Hyperlipidemia     Hypertension     Pre-diabetes 10/10/2022    A1c about 6.2 since 2018    Stroke-like symptoms: vertigo and gait imbalance 9/22/2022    Urge incontinence of urine 5/6/2016       Past Surgical History:   Procedure Laterality Date    HYSTERECTOMY      KNEE SURGERY Bilateral 2012       Family History   Problem Relation Age of Onset    Hypertension Mother     Cancer Mother     Stroke Father      I have reviewed and agree with the history as documented.    E-Cigarette/Vaping    E-Cigarette Use Never User      E-Cigarette/Vaping Substances    Nicotine No     THC No     CBD No     Flavoring No     Other No     Unknown No      Social History     Tobacco Use    Smoking status: Never    Smokeless tobacco: Never   Vaping Use    Vaping status: Never Used   Substance Use Topics    Alcohol use: Never    Drug use: Never       Review of Systems   Cardiovascular:  Positive for syncope.   All other systems reviewed and are negative.      Physical Exam  Physical Exam  Vitals and nursing note reviewed.   Constitutional:       Appearance: Normal  appearance.      Comments: Morbidly obese w/ BMI 45.57   HENT:      Mouth/Throat:      Mouth: Mucous membranes are moist.   Eyes:      Conjunctiva/sclera: Conjunctivae normal.   Cardiovascular:      Rate and Rhythm: Tachycardia present. Rhythm irregular.      Heart sounds: No murmur heard.  Pulmonary:      Effort: Pulmonary effort is normal. No respiratory distress.      Breath sounds: Normal breath sounds. No stridor. No wheezing, rhonchi or rales.   Abdominal:      Palpations: Abdomen is soft.   Musculoskeletal:         General: No tenderness.      Cervical back: Normal range of motion.   Skin:     General: Skin is warm.   Neurological:      General: No focal deficit present.      Mental Status: She is alert.   Psychiatric:         Mood and Affect: Mood normal.         Vital Signs  ED Triage Vitals [03/23/24 1412]   Temperature Pulse Respirations Blood Pressure SpO2   97.7 °F (36.5 °C) (!) 117 20 147/90 95 %      Temp Source Heart Rate Source Patient Position - Orthostatic VS BP Location FiO2 (%)   Oral Monitor Lying Left arm --      Pain Score       No Pain           Vitals:    03/23/24 1627 03/23/24 1630 03/23/24 1641 03/23/24 1744   BP: 135/91 (!) 173/147 134/79 97/88   Pulse: 78 86 (!) 108 (!) 126   Patient Position - Orthostatic VS: Lying - Orthostatic VS Sitting - Orthostatic VS Standing - Orthostatic VS Lying         Visual Acuity      ED Medications  Medications   atorvastatin (LIPITOR) tablet 10 mg (has no administration in time range)   digoxin (LANOXIN) tablet 125 mcg (has no administration in time range)   metoprolol tartrate (LOPRESSOR) tablet 100 mg (has no administration in time range)   rivaroxaban (XARELTO) tablet 20 mg (has no administration in time range)   diltiazem (CARDIZEM) injection 15 mg (has no administration in time range)   furosemide (LASIX) injection 20 mg (has no administration in time range)   furosemide (LASIX) injection 20 mg (20 mg Intravenous Given 3/23/24 1511)       Diagnostic  Studies  Results Reviewed       Procedure Component Value Units Date/Time    HS Troponin I 2hr [[id number]]  (Normal) Collected: 03/23/24 1624    Lab Status: Final result Specimen: Blood from Arm, Left Updated: 03/23/24 1652     hs TnI 2hr 15 ng/L      Delta 2hr hsTnI 1 ng/L     HS Troponin I 4hr [[id number]]     Lab Status: No result Specimen: Blood     Digoxin level [[id number]]  (Abnormal) Collected: 03/23/24 1427    Lab Status: Final result Specimen: Blood from Arm, Left Updated: 03/23/24 1510     Digoxin Lvl 0.6 ng/mL     HS Troponin 0hr (reflex protocol) [[id number]]  (Normal) Collected: 03/23/24 1427    Lab Status: Final result Specimen: Blood from Arm, Left Updated: 03/23/24 1453     hs TnI 0hr 14 ng/L     B-Type Natriuretic Peptide(BNP) [[id number]]  (Abnormal) Collected: 03/23/24 1427    Lab Status: Final result Specimen: Blood from Arm, Left Updated: 03/23/24 1453      pg/mL     Comprehensive metabolic panel [[id number]]  (Abnormal) Collected: 03/23/24 1427    Lab Status: Final result Specimen: Blood from Arm, Left Updated: 03/23/24 1451     Sodium 127 mmol/L      Potassium 3.5 mmol/L      Chloride 92 mmol/L      CO2 28 mmol/L      ANION GAP 7 mmol/L      BUN 10 mg/dL      Creatinine 0.78 mg/dL      Glucose 169 mg/dL      Calcium 9.2 mg/dL      AST 16 U/L      ALT 15 U/L      Alkaline Phosphatase 78 U/L      Total Protein 6.4 g/dL      Albumin 3.8 g/dL      Total Bilirubin 0.75 mg/dL      eGFR 67 ml/min/1.73sq m     Narrative:      [employer] guidelines for Chronic Kidney Disease (CKD):     Stage 1 with normal or high GFR (GFR > 90 mL/min/1.73 square meters)    Stage 2 Mild CKD (GFR = 60-89 mL/min/1.73 square meters)    Stage 3A Moderate CKD (GFR = 45-59 mL/min/1.73 square meters)    Stage 3B Moderate CKD (GFR = 30-44 mL/min/1.73 square meters)    Stage 4 Severe CKD (GFR = 15-29 mL/min/1.73 square meters)    Stage 5 End Stage CKD (GFR <15 mL/min/1.73 square meters)  Note: GFR  calculation is accurate only with a steady state creatinine    CBC and differential [[id number]] Collected: 03/23/24 1427    Lab Status: Final result Specimen: Blood from Arm, Left Updated: 03/23/24 1432     WBC 7.57 Thousand/uL      RBC 4.70 Million/uL      Hemoglobin 13.5 g/dL      Hematocrit 40.1 %      MCV 85 fL      MCH 28.7 pg      MCHC 33.7 g/dL      RDW 12.6 %      MPV 9.1 fL      Platelets 252 Thousands/uL      nRBC 0 /100 WBCs      Neutrophils Relative 75 %      Immature Grans % 0 %      Lymphocytes Relative 16 %      Monocytes Relative 8 %      Eosinophils Relative 1 %      Basophils Relative 0 %      Neutrophils Absolute 5.64 Thousands/µL      Absolute Immature Grans 0.03 Thousand/uL      Absolute Lymphocytes 1.20 Thousands/µL      Absolute Monocytes 0.60 Thousand/µL      Eosinophils Absolute 0.09 Thousand/µL      Basophils Absolute 0.01 Thousands/µL                    XR chest 1 view portable   ED Interpretation by Stormy Grissom DO (03/23 1502)   Xray reviewed and independently interpreted by me: mild vascular congestion                 Procedures  ECG 12 Lead Documentation Only    Date/Time: 3/23/2024 2:18 PM    Performed by: Stormy Grissom DO  Authorized by: Stormy Grissom DO    Indications / Diagnosis:  Near syncope  ECG reviewed by me, the ED Provider: yes    Patient location:  ED  Previous ECG:     Previous ECG:  Compared to current    Similarity:  Changes noted  Interpretation:     Interpretation: abnormal    Rate:     ECG rate:  101    ECG rate assessment: tachycardic    Rhythm:     Rhythm: atrial fibrillation    QRS:     QRS axis:  Left  Conduction:     Conduction: abnormal      Abnormal conduction: complete LBBB             ED Course  ED Course as of 03/23/24 1750   Sat Mar 23, 2024   1458 Sodium(!): 127  132-134 seven months ago   1458 Creatinine: 0.78  baseline   1458 BNP(!): 457  No old to compare   1459 hs TnI 0hr: 14  8-10 seven months ago   1500 XR chest 1 view portable  Xray  reviewed and independently interpreted by me: mild vascular congestion                               SBIRT 22yo+      Flowsheet Row Most Recent Value   Initial Alcohol Screen: US AUDIT-C     1. How often do you have a drink containing alcohol? 0 Filed at: 03/23/2024 1433   2. How many drinks containing alcohol do you have on a typical day you are drinking?  0 Filed at: 03/23/2024 1433   3b. FEMALE Any Age, or MALE 65+: How often do you have 4 or more drinks on one occassion? 0 Filed at: 03/23/2024 1433   Audit-C Score 0 Filed at: 03/23/2024 1433   ANDREAS: How many times in the past year have you...    Used an illegal drug or used a prescription medication for non-medical reasons? Never Filed at: 03/23/2024 1433                      Medical Decision Making  89y F w/ near syncope and benign exam upon presentation other that atrial fibrillation w/ some episodes of rapid response.  Will get EKG to r/o arrhythmia, ischemic changes.  Will get labs to r/o acute life threatening metabolic abnl, cardiac ischemia, significant anemia.  Will get CXR to r/o occult pna, pulm edema    Problems Addressed:  Atrial fibrillation (HCC): chronic illness or injury with exacerbation, progression, or side effects of treatment  Hyponatremia: acute illness or injury that poses a threat to life or bodily functions  Pulmonary vascular congestion: acute illness or injury that poses a threat to life or bodily functions    Amount and/or Complexity of Data Reviewed  Independent Historian: EMS     Details: daughter  External Data Reviewed: notes.     Details: FP visit from February reviewed  Labs: ordered. Decision-making details documented in ED Course.  Radiology: ordered and independent interpretation performed. Decision-making details documented in ED Course.  ECG/medicine tests: ordered and independent interpretation performed.  Discussion of management or test interpretation with external provider(s): Discussed with [facility].  We had a detailed  discussion of the patient's condition and case, including the need for admission.  Accepted to their service.  Bed request / bridging orders placed.         Risk  Prescription drug management.  Decision regarding hospitalization.             Disposition  Final diagnoses:   Pulmonary vascular congestion   Atrial fibrillation (HCC)   Hyponatremia     Time reflects when diagnosis was documented in both MDM as applicable and the Disposition within this note       Time User Action Codes Description Comment    3/23/2024  3:04 PM Stormy Grissom Add [R09.89] Pulmonary vascular congestion     3/23/2024  3:05 PM Stormy Grissom Add [I48.91] Atrial fibrillation (HCC)     3/23/2024  3:22 PM Stormy Grissom Add [E87.1] Hyponatremia           ED Disposition       ED Disposition   Admit    Condition   Stable    Date/Time   Sat Mar 23, 2024 1523    Comment   Case was discussed with Dr. Garzon and the patient's admission status was agreed to be inpatient to the service of Dr. Garzon               Follow-up Information    None         Current Discharge Medication List        CONTINUE these medications which have NOT CHANGED    Details   amLODIPine (NORVASC) 5 mg tablet Take 1 tablet (5 mg total) by mouth daily  Qty: 90 tablet, Refills: 3    Associated Diagnoses: Primary hypertension      atorvastatin (LIPITOR) 10 mg tablet Take 1 tablet (10 mg total) by mouth daily  Qty: 90 tablet, Refills: 3    Associated Diagnoses: Mixed hyperlipidemia      cholecalciferol (VITAMIN D3) 400 units tablet Take 1 tablet by mouth daily      cyanocobalamin (VITAMIN B-12) 100 mcg tablet Take by mouth      digoxin (Digox) 0.125 mg tablet Tablets daily for 4 days, then 1 tablet daily  Qty: 94 tablet, Refills: 3    Associated Diagnoses: Paroxysmal A-fib (HCC)      hydroCHLOROthiazide 25 mg tablet Take 1 tablet (25 mg total) by mouth daily  Qty: 90 tablet, Refills: 3    Associated Diagnoses: Primary hypertension      metoprolol tartrate (LOPRESSOR) 100  mg tablet Take 1 tablet (100 mg total) by mouth 2 (two) times a day  Qty: 180 tablet, Refills: 3    Associated Diagnoses: Primary hypertension      Multiple Vitamin (Daily Value Multivitamin) TABS Take by mouth      rivaroxaban (XARELTO) 20 mg tablet Take 1 tablet (20 mg total) by mouth daily  Qty: 90 tablet, Refills: 3    Associated Diagnoses: Paroxysmal A-fib (HCC)      ELDERBERRY PO Take by mouth      FISH OIL-KRILL OIL PO Take by mouth      zinc gluconate 50 mg tablet Take 50 mg by mouth daily             No discharge procedures on file.    PDMP Review         Value Time User    PDMP Reviewed  Yes 8/14/2023 11:21 PM Jj Kendall PA-C            ED Provider  Electronically Signed by             Stormy Grissom DO  03/23/24 5942

## 2024-03-23 NOTE — H&P
"[facility]  H&P  Name: Diana Godoy 89 y.o. female I MRN: [id number]  Unit/Bed#: ED-29 I Date of Admission: 3/23/2024   Date of Service: 3/23/2024 I Hospital Day: 0      Assessment/Plan   * Syncope  Assessment & Plan  Near syncope in the setting of uncontrolled atrial fibrillation  plus minus vascular congestion/diastolic CHF  Currently stable with improved heart rate.  Check echocardiogram to see if there is any change in ejection fraction or function  Check orthostatics for completeness    Atrial fibrillation with RVR (HCC)  Assessment & Plan  Atrial fibrillation with RVR  Fully anticoagulated on Xarelto  Resume digoxin  Add diltiazem for persistent heart rate more than 110  Maintain on telemetry  Check TSH with reflex T4  Consult cardiology    LBBB (left bundle branch block)  Assessment & Plan  New left bundle branch block compared to previous.  Unsure if this is rate related versus  persistent.  Repeat 2-hour EKG pending  Chest pain-free with normal troponin  Low suspicion for ACS  Echocardiogram ordered    Hyponatremia  Assessment & Plan  Hypervolemic hyponatremia  Possible diastolic CHF in the setting of uncontrolled A-fib  Repeat BMP tomorrow  Resume Lasix    HTN (hypertension)  Assessment & Plan  Hold Norvasc/HCTZ in place of Cardizem/Lasix  Continue metoprolol 100 mg twice daily         VTE Pharmacologic Prophylaxis: VTE Score: 3 Moderate Risk (Score 3-4) - Pharmacological DVT Prophylaxis Ordered: rivaroxaban (Xarelto).  Code Status: Prior dnr  Discussion with family: Updated  (son and daughter) at bedside.    Anticipated Length of Stay: Patient will be admitted on an inpatient basis with an anticipated length of stay of greater than 2 midnights secondary to near syncope.    Chief Complaint: \"Passed out\"    History of Present Illness:  Diana Godoy is a 89 y.o. female with a PMH of atrial fibrillation, hypertension, hyperlipidemia, prediabetes who " presents with syncope.  She  was at her usual state of health until after eating lunch at around 12:00-12:15 this afternoon.  She recalled eating her food, walking to the couch to rest and while at the couch she felt lightheaded and warm.  She felt like she was about to pass out.  She reportedly passed out on the couch for a few minutes.  Fortunately, her daughter checked up on her at around 1215.  She told her what happened.  She called her brother was nearby and checked her heart rate via the Kardia device.  At that time she was feeling lightheaded again.  According to her daughter the heart rate was 126.  She  initially wanted to bring her to the hospital herself because she was having dizziness with uncontrolled heart rates she decided to call EMS.  She and the patient admitted that she has had episodes of lightheadedness over the past few months.  She used to see [facility]. Chávez [facility] but has not seen him since he retired.  Seen a cardiologist since.  His PCP started on digoxin  in late February.    Review of Systems:  Review of Systems   Constitutional:  Negative for activity change, appetite change, fatigue and fever.   HENT: Negative.     Respiratory: Negative.     Cardiovascular:         + Feeling of warmth   Gastrointestinal: Negative.    Genitourinary: Negative.    Musculoskeletal: Negative.    Neurological:  Positive for syncope and light-headedness.   Psychiatric/Behavioral: Negative.     All other systems reviewed and are negative.      Past Medical and Surgical History:   Past Medical History:   Diagnosis Date    Afib (HCC)     Colon polyp 4/19/2018    Formatting of this note might be different from the original. Colonoscopy 4/19/18    Hyperlipidemia     Hypertension     Pre-diabetes 10/10/2022    A1c about 6.2 since 2018    Stroke-like symptoms: vertigo and gait imbalance 9/22/2022    Urge incontinence of urine 5/6/2016       Past Surgical History:   Procedure Laterality Date     HYSTERECTOMY      KNEE SURGERY Bilateral 2012       Meds/Allergies:  Prior to Admission medications    Medication Sig Start Date End Date Taking? Authorizing Provider   amLODIPine (NORVASC) 5 mg tablet Take 1 tablet (5 mg total) by mouth daily 2/26/24  Yes Ezio Mckenna MD   atorvastatin (LIPITOR) 10 mg tablet Take 1 tablet (10 mg total) by mouth daily 2/26/24  Yes Ezio Mckenna MD   cholecalciferol (VITAMIN D3) 400 units tablet Take 1 tablet by mouth daily   Yes Historical Provider, MD   cyanocobalamin (VITAMIN B-12) 100 mcg tablet Take by mouth   Yes Historical Provider, MD   digoxin (Digox) 0.125 mg tablet Tablets daily for 4 days, then 1 tablet daily 2/26/24  Yes Ezio Mckenna MD   hydroCHLOROthiazide 25 mg tablet Take 1 tablet (25 mg total) by mouth daily 2/26/24  Yes Ezio Mckenna MD   metoprolol tartrate (LOPRESSOR) 100 mg tablet Take 1 tablet (100 mg total) by mouth 2 (two) times a day 2/26/24  Yes Ezio Mckenna MD   Multiple Vitamin (Daily Value Multivitamin) TABS Take by mouth   Yes Historical Provider, MD   rivaroxaban (XARELTO) 20 mg tablet Take 1 tablet (20 mg total) by mouth daily 2/26/24  Yes Ezio Mckenna MD   ELDERBERRY PO Take by mouth    Historical Provider, MD   FISH OIL-KRILL OIL PO Take by mouth    Historical Provider, MD   zinc gluconate 50 mg tablet Take 50 mg by mouth daily    Historical Provider, MD     I have reviewed home medications with a medical source (PCP, Pharmacy, other).    Allergies:   Allergies   Allergen Reactions    Lisinopril Hives     Other reaction(s): Angioedema    Oxycodone-Acetaminophen Rash and GI Intolerance       Social History:  Marital Status: Unknown   Occupation: none  Patient Pre-hospital Living Situation: Home  Patient Pre-hospital Level of Mobility: unable to be assessed at time of evaluation  Patient Pre-hospital Diet Restrictions: none  Substance Use History:   Social History     Substance and Sexual Activity   Alcohol Use Never     Social History  "    Tobacco Use   Smoking Status Never   Smokeless Tobacco Never     Social History     Substance and Sexual Activity   Drug Use Never       Family History:  Family History   Problem Relation Age of Onset    Hypertension Mother     Cancer Mother     Stroke Father        Physical Exam:     Vitals:   Blood Pressure: 147/90 (03/23/24 1412)  Pulse: 104 (03/23/24 1500)  Temperature: 97.7 °F (36.5 °C) (03/23/24 1412)  Temp Source: Oral (03/23/24 1412)  Respirations: (!) 23 (03/23/24 1500)  Height: 5' 3\" (160 cm) (03/23/24 1412)  Weight - Scale: 117 kg (257 lb 4.4 oz) (03/23/24 1412)  SpO2: 93 % (03/23/24 1500)    Physical Exam  Vitals reviewed.   Constitutional:       Appearance: She is obese.   HENT:      Head: Normocephalic and atraumatic.      Nose: No congestion or rhinorrhea.   Eyes:      General: No scleral icterus.  Cardiovascular:      Rate and Rhythm: Normal rate. Rhythm irregular.   Pulmonary:      Breath sounds: No wheezing, rhonchi or rales.   Abdominal:      Palpations: Abdomen is soft.      Tenderness: There is no abdominal tenderness. There is no guarding.   Musculoskeletal:      Right lower leg: No edema.      Left lower leg: No edema.      Comments: Obese body habitus   Skin:     General: Skin is warm and dry.   Neurological:      Mental Status: She is oriented to person, place, and time.   Psychiatric:         Mood and Affect: Mood normal.         Behavior: Behavior normal.          Additional Data:     Lab Results:  Results from last 7 days   Lab Units 03/23/24  1427   WBC Thousand/uL 7.57   HEMOGLOBIN g/dL 13.5   HEMATOCRIT % 40.1   PLATELETS Thousands/uL 252   NEUTROS PCT % 75   LYMPHS PCT % 16   MONOS PCT % 8   EOS PCT % 1     Results from last 7 days   Lab Units 03/23/24  1427   SODIUM mmol/L 127*   POTASSIUM mmol/L 3.5   CHLORIDE mmol/L 92*   CO2 mmol/L 28   BUN mg/dL 10   CREATININE mg/dL 0.78   ANION GAP mmol/L 7   CALCIUM mg/dL 9.2   ALBUMIN g/dL 3.8   TOTAL BILIRUBIN mg/dL 0.75   ALK PHOS U/L " 78   ALT U/L 15   AST U/L 16   GLUCOSE RANDOM mg/dL 169*                       Lines/Drains:  Invasive Devices       Peripheral Intravenous Line  Duration             Peripheral IV 03/23/24 Left Antecubital <1 day                        Imaging: Personally reviewed the following imaging: chest xray x-ray with cardiomegaly and possible vascular congestion related to AP view  XR chest 1 view portable   ED Interpretation by Stormy Grissom DO (03/23 1502)   Xray reviewed and independently interpreted by me: mild vascular congestion          EKG and Other Studies Reviewed on Admission:   EKG:  Atrial fibrillation with heart rate more than 100 noticed new left bundle branch block not present on previous EKGs.    ** Please Note: This note has been constructed using a voice recognition system. **

## 2024-03-23 NOTE — ASSESSMENT & PLAN NOTE
Atrial fibrillation with RVR  Fully anticoagulated on Xarelto  Resume digoxin  Add diltiazem for persistent heart rate more than 110  Maintain on telemetry  Check TSH with reflex T4  Consult cardiology

## 2024-03-23 NOTE — ASSESSMENT & PLAN NOTE
Near syncope in the setting of uncontrolled atrial fibrillation  plus minus vascular congestion/diastolic CHF  Currently stable with improved heart rate.  Check echocardiogram to see if there is any change in ejection fraction or function  Check orthostatics for completeness

## 2024-03-23 NOTE — Clinical Note
Case was discussed with  and the patient's admission status was agreed to be  to the service of Dr. Walker

## 2024-03-23 NOTE — ASSESSMENT & PLAN NOTE
Hypervolemic hyponatremia  Possible diastolic CHF in the setting of uncontrolled A-fib  Repeat BMP tomorrow  Resume Lasix

## 2024-03-23 NOTE — PLAN OF CARE
Problem: SAFETY ADULT  Goal: Patient will remain free of falls  Description: INTERVENTIONS:  - Educate patient/family on patient safety including physical limitations  - Instruct patient to call for assistance with activity   - Consult OT/PT to assist with strengthening/mobility   - Keep Call bell within reach  - Keep bed low and locked with side rails adjusted as appropriate  - Keep care items and personal belongings within reach  - Initiate and maintain comfort rounds  - Make Fall Risk Sign visible to staff  - Offer Toileting every  Hours, in advance of need  - Initiate/Maintain alarm  - Obtain necessary fall risk management equipment:   - Apply yellow socks and bracelet for high fall risk patients  - Consider moving patient to room near nurses station  Outcome: Progressing  Goal: Maintain or return to baseline ADL function  Description: INTERVENTIONS:  -  Assess patient's ability to carry out ADLs; assess patient's baseline for ADL function and identify physical deficits which impact ability to perform ADLs (bathing, care of mouth/teeth, toileting, grooming, dressing, etc.)  - Assess/evaluate cause of self-care deficits   - Assess range of motion  - Assess patient's mobility; develop plan if impaired  - Assess patient's need for assistive devices and provide as appropriate  - Encourage maximum independence but intervene and supervise when necessary  - Involve family in performance of ADLs  - Assess for home care needs following discharge   - Consider OT consult to assist with ADL evaluation and planning for discharge  - Provide patient education as appropriate  Outcome: Progressing  Goal: Maintains/Returns to pre admission functional level  Description: INTERVENTIONS:  - Perform AM-PAC 6 Click Basic Mobility/ Daily Activity assessment daily.  - Set and communicate daily mobility goal to care team and patient/family/caregiver.   - Collaborate with rehabilitation services on mobility goals if consulted  - Perform  Range of Motion  times a day.  - Reposition patient every  hours.  - Dangle patient  times a day  - Stand patient  times a day  - Ambulate patient  times a day  - Out of bed to chair  times a day   - Out of bed for meals  times a day  - Out of bed for toileting  - Record patient progress and toleration of activity level   Outcome: Progressing     Problem: DISCHARGE PLANNING  Goal: Discharge to home or other facility with appropriate resources  Description: INTERVENTIONS:  - Identify barriers to discharge w/patient and caregiver  - Arrange for needed discharge resources and transportation as appropriate  - Identify discharge learning needs (meds, wound care, etc.)  - Arrange for interpretive services to assist at discharge as needed  - Refer to Case Management Department for coordinating discharge planning if the patient needs post-hospital services based on physician/advanced practitioner order or complex needs related to functional status, cognitive ability, or social support system  Outcome: Progressing     Problem: Knowledge Deficit  Goal: Patient/family/caregiver demonstrates understanding of disease process, treatment plan, medications, and discharge instructions  Description: Complete learning assessment and assess knowledge base.  Interventions:  - Provide teaching at level of understanding  - Provide teaching via preferred learning methods  Outcome: Progressing     Problem: CARDIOVASCULAR - ADULT  Goal: Maintains optimal cardiac output and hemodynamic stability  Description: INTERVENTIONS:  - Monitor I/O, vital signs and rhythm  - Monitor for S/S and trends of decreased cardiac output  - Administer and titrate ordered vasoactive medications to optimize hemodynamic stability  - Assess quality of pulses, skin color and temperature  - Assess for signs of decreased coronary artery perfusion  - Instruct patient to report change in severity of symptoms  Outcome: Progressing  Goal: Absence of cardiac dysrhythmias  or at baseline rhythm  Description: INTERVENTIONS:  - Continuous cardiac monitoring, vital signs, obtain 12 lead EKG if ordered  - Administer antiarrhythmic and heart rate control medications as ordered  - Monitor electrolytes and administer replacement therapy as ordered  Outcome: Progressing

## 2024-03-24 ENCOUNTER — HOSPITAL ENCOUNTER (INPATIENT)
Facility: HOSPITAL | Age: 89
LOS: 3 days | Discharge: HOME | DRG: 242 | End: 2024-03-27
Attending: INTERNAL MEDICINE | Admitting: INTERNAL MEDICINE
Payer: COMMERCIAL

## 2024-03-24 ENCOUNTER — APPOINTMENT (INPATIENT)
Dept: NON INVASIVE DIAGNOSTICS | Facility: HOSPITAL | Age: 89
DRG: 309 | End: 2024-03-24
Payer: COMMERCIAL

## 2024-03-24 VITALS
SYSTOLIC BLOOD PRESSURE: 123 MMHG | OXYGEN SATURATION: 94 % | RESPIRATION RATE: 18 BRPM | HEART RATE: 79 BPM | WEIGHT: 250.44 LBS | DIASTOLIC BLOOD PRESSURE: 78 MMHG | BODY MASS INDEX: 44.38 KG/M2 | TEMPERATURE: 98.4 F | HEIGHT: 63 IN

## 2024-03-24 DIAGNOSIS — I45.5 SINUS PAUSE: ICD-10-CM

## 2024-03-24 DIAGNOSIS — R55 CARDIAC SYNCOPE: ICD-10-CM

## 2024-03-24 DIAGNOSIS — I48.91 ATRIAL FIBRILLATION (HCC): Primary | ICD-10-CM

## 2024-03-24 DIAGNOSIS — I44.7 LBBB (LEFT BUNDLE BRANCH BLOCK): ICD-10-CM

## 2024-03-24 LAB
ANION GAP SERPL CALCULATED.3IONS-SCNC: 10 MMOL/L (ref 4–13)
ATRIAL RATE: 129 BPM
BASOPHILS # BLD AUTO: 0.01 THOUSANDS/ÂΜL (ref 0–0.1)
BASOPHILS NFR BLD AUTO: 0 % (ref 0–1)
BUN SERPL-MCNC: 10 MG/DL (ref 5–25)
CALCIUM SERPL-MCNC: 9.5 MG/DL (ref 8.4–10.2)
CARDIAC TROPONIN I PNL SERPL HS: 19 NG/L
CHLORIDE SERPL-SCNC: 90 MMOL/L (ref 96–108)
CO2 SERPL-SCNC: 27 MMOL/L (ref 21–32)
CREAT SERPL-MCNC: 0.78 MG/DL (ref 0.6–1.3)
EOSINOPHIL # BLD AUTO: 0.13 THOUSAND/ÂΜL (ref 0–0.61)
EOSINOPHIL NFR BLD AUTO: 2 % (ref 0–6)
ERYTHROCYTE [DISTWIDTH] IN BLOOD BY AUTOMATED COUNT: 12.6 % (ref 11.6–15.1)
GFR SERPL CREATININE-BSD FRML MDRD: 67 ML/MIN/1.73SQ M
GLUCOSE SERPL-MCNC: 138 MG/DL (ref 65–140)
HCT VFR BLD AUTO: 40.3 % (ref 34.8–46.1)
HGB BLD-MCNC: 13.7 G/DL (ref 11.5–15.4)
IMM GRANULOCYTES # BLD AUTO: 0.03 THOUSAND/UL (ref 0–0.2)
IMM GRANULOCYTES NFR BLD AUTO: 0 % (ref 0–2)
LYMPHOCYTES # BLD AUTO: 2.06 THOUSANDS/ÂΜL (ref 0.6–4.47)
LYMPHOCYTES NFR BLD AUTO: 24 % (ref 14–44)
MAGNESIUM SERPL-MCNC: 1.7 MG/DL (ref 1.9–2.7)
MCH RBC QN AUTO: 28.7 PG (ref 26.8–34.3)
MCHC RBC AUTO-ENTMCNC: 34 G/DL (ref 31.4–37.4)
MCV RBC AUTO: 85 FL (ref 82–98)
MONOCYTES # BLD AUTO: 0.59 THOUSAND/ÂΜL (ref 0.17–1.22)
MONOCYTES NFR BLD AUTO: 7 % (ref 4–12)
NEUTROPHILS # BLD AUTO: 5.76 THOUSANDS/ÂΜL (ref 1.85–7.62)
NEUTS SEG NFR BLD AUTO: 67 % (ref 43–75)
NRBC BLD AUTO-RTO: 0 /100 WBCS
PLATELET # BLD AUTO: 265 THOUSANDS/UL (ref 149–390)
PMV BLD AUTO: 9.2 FL (ref 8.9–12.7)
POTASSIUM SERPL-SCNC: 3.6 MMOL/L (ref 3.5–5.3)
QRS AXIS: -56 DEGREES
QRSD INTERVAL: 156 MS
QT INTERVAL: 402 MS
QTC INTERVAL: 510 MS
RBC # BLD AUTO: 4.77 MILLION/UL (ref 3.81–5.12)
SODIUM SERPL-SCNC: 127 MMOL/L (ref 135–147)
T WAVE AXIS: 81 DEGREES
TSH SERPL DL<=0.05 MIU/L-ACNC: 1.98 UIU/ML (ref 0.45–4.5)
VENTRICULAR RATE: 97 BPM
WBC # BLD AUTO: 8.58 THOUSAND/UL (ref 4.31–10.16)

## 2024-03-24 PROCEDURE — 99223 1ST HOSP IP/OBS HIGH 75: CPT | Performed by: STUDENT IN AN ORGANIZED HEALTH CARE EDUCATION/TRAINING PROGRAM

## 2024-03-24 PROCEDURE — 99222 1ST HOSP IP/OBS MODERATE 55: CPT | Performed by: INTERNAL MEDICINE

## 2024-03-24 PROCEDURE — 83735 ASSAY OF MAGNESIUM: CPT | Performed by: INTERNAL MEDICINE

## 2024-03-24 PROCEDURE — 84443 ASSAY THYROID STIM HORMONE: CPT | Performed by: INTERNAL MEDICINE

## 2024-03-24 PROCEDURE — 99239 HOSP IP/OBS DSCHRG MGMT >30: CPT | Performed by: INTERNAL MEDICINE

## 2024-03-24 PROCEDURE — 85025 COMPLETE CBC W/AUTO DIFF WBC: CPT | Performed by: INTERNAL MEDICINE

## 2024-03-24 PROCEDURE — 84484 ASSAY OF TROPONIN QUANT: CPT | Performed by: INTERNAL MEDICINE

## 2024-03-24 PROCEDURE — 80048 BASIC METABOLIC PNL TOTAL CA: CPT | Performed by: INTERNAL MEDICINE

## 2024-03-24 RX ORDER — DIPHENHYDRAMINE HCL 25 MG
25 TABLET ORAL EVERY 6 HOURS PRN
Status: DISCONTINUED | OUTPATIENT
Start: 2024-03-24 | End: 2024-03-27 | Stop reason: HOSPADM

## 2024-03-24 RX ORDER — MAGNESIUM SULFATE HEPTAHYDRATE 40 MG/ML
2 INJECTION, SOLUTION INTRAVENOUS ONCE
Status: COMPLETED | OUTPATIENT
Start: 2024-03-24 | End: 2024-03-24

## 2024-03-24 RX ORDER — LANOLIN ALCOHOL/MO/W.PET/CERES
3 CREAM (GRAM) TOPICAL
Status: CANCELLED | OUTPATIENT
Start: 2024-03-24

## 2024-03-24 RX ORDER — HEPARIN SODIUM 5000 [USP'U]/ML
5000 INJECTION, SOLUTION INTRAVENOUS; SUBCUTANEOUS EVERY 8 HOURS SCHEDULED
Status: DISCONTINUED | OUTPATIENT
Start: 2024-03-24 | End: 2024-03-25

## 2024-03-24 RX ORDER — FUROSEMIDE 10 MG/ML
20 INJECTION INTRAMUSCULAR; INTRAVENOUS DAILY
Status: CANCELLED | OUTPATIENT
Start: 2024-03-25

## 2024-03-24 RX ORDER — AMIODARONE HYDROCHLORIDE 200 MG/1
200 TABLET ORAL
Qty: 21 TABLET | Refills: 0 | Status: DISCONTINUED | OUTPATIENT
Start: 2024-03-24 | End: 2024-03-24 | Stop reason: HOSPADM

## 2024-03-24 RX ORDER — AMIODARONE HYDROCHLORIDE 200 MG/1
200 TABLET ORAL
Status: DISCONTINUED | OUTPATIENT
Start: 2024-03-24 | End: 2024-03-25

## 2024-03-24 RX ORDER — FUROSEMIDE 10 MG/ML
20 INJECTION INTRAMUSCULAR; INTRAVENOUS DAILY
Status: DISCONTINUED | OUTPATIENT
Start: 2024-03-25 | End: 2024-03-26

## 2024-03-24 RX ORDER — DILTIAZEM HYDROCHLORIDE 5 MG/ML
15 INJECTION INTRAVENOUS EVERY 6 HOURS PRN
Status: DISCONTINUED | OUTPATIENT
Start: 2024-03-24 | End: 2024-03-25

## 2024-03-24 RX ORDER — METOPROLOL TARTRATE 50 MG/1
50 TABLET, FILM COATED ORAL 2 TIMES DAILY
Status: DISCONTINUED | OUTPATIENT
Start: 2024-03-24 | End: 2024-03-24 | Stop reason: HOSPADM

## 2024-03-24 RX ORDER — ATORVASTATIN CALCIUM 10 MG/1
10 TABLET, FILM COATED ORAL
Status: DISCONTINUED | OUTPATIENT
Start: 2024-03-24 | End: 2024-03-27 | Stop reason: HOSPADM

## 2024-03-24 RX ORDER — ATORVASTATIN CALCIUM 10 MG/1
10 TABLET, FILM COATED ORAL
Status: CANCELLED | OUTPATIENT
Start: 2024-03-24

## 2024-03-24 RX ORDER — METOPROLOL TARTRATE 50 MG/1
50 TABLET, FILM COATED ORAL 2 TIMES DAILY
Status: CANCELLED | OUTPATIENT
Start: 2024-03-24

## 2024-03-24 RX ORDER — LANOLIN ALCOHOL/MO/W.PET/CERES
3 CREAM (GRAM) TOPICAL
Status: DISCONTINUED | OUTPATIENT
Start: 2024-03-24 | End: 2024-03-27 | Stop reason: HOSPADM

## 2024-03-24 RX ORDER — DILTIAZEM HYDROCHLORIDE 5 MG/ML
15 INJECTION INTRAVENOUS EVERY 6 HOURS PRN
Status: CANCELLED | OUTPATIENT
Start: 2024-03-24

## 2024-03-24 RX ORDER — DIPHENHYDRAMINE HCL 25 MG
25 TABLET ORAL EVERY 6 HOURS PRN
Status: CANCELLED | OUTPATIENT
Start: 2024-03-24

## 2024-03-24 RX ORDER — METOPROLOL TARTRATE 50 MG/1
50 TABLET, FILM COATED ORAL 2 TIMES DAILY
Status: DISCONTINUED | OUTPATIENT
Start: 2024-03-24 | End: 2024-03-27

## 2024-03-24 RX ORDER — AMIODARONE HYDROCHLORIDE 200 MG/1
200 TABLET ORAL
Status: CANCELLED | OUTPATIENT
Start: 2024-03-24 | End: 2024-03-31

## 2024-03-24 RX ADMIN — METOPROLOL TARTRATE 50 MG: 50 TABLET, FILM COATED ORAL at 18:18

## 2024-03-24 RX ADMIN — RIVAROXABAN 20 MG: 20 TABLET, FILM COATED ORAL at 08:38

## 2024-03-24 RX ADMIN — METOPROLOL TARTRATE 100 MG: 100 TABLET, FILM COATED ORAL at 08:38

## 2024-03-24 RX ADMIN — AMIODARONE HYDROCHLORIDE 200 MG: 200 TABLET ORAL at 17:00

## 2024-03-24 RX ADMIN — MAGNESIUM SULFATE HEPTAHYDRATE 2 G: 40 INJECTION, SOLUTION INTRAVENOUS at 08:39

## 2024-03-24 RX ADMIN — ATORVASTATIN CALCIUM 10 MG: 10 TABLET, FILM COATED ORAL at 17:00

## 2024-03-24 RX ADMIN — AMIODARONE HYDROCHLORIDE 200 MG: 200 TABLET ORAL at 11:10

## 2024-03-24 RX ADMIN — DIGOXIN 125 MCG: 125 TABLET ORAL at 08:38

## 2024-03-24 RX ADMIN — HEPARIN SODIUM 5000 UNITS: 5000 INJECTION INTRAVENOUS; SUBCUTANEOUS at 21:55

## 2024-03-24 RX ADMIN — FUROSEMIDE 20 MG: 10 INJECTION, SOLUTION INTRAVENOUS at 08:38

## 2024-03-24 NOTE — ASSESSMENT & PLAN NOTE
On Xarelto as outpatient  At home, takes 100 mg metoprolol twice daily, digoxin 125 mcg p.o. daily  RVR from [place] ED is resolved  Digoxin held this admission  TSH wnl    Plan:  Metoprolol increased at discharge back to home dose of 100 mg from 50 mg  Discontinuing home digoxin   Continue Xarelto

## 2024-03-24 NOTE — DISCHARGE SUMMARY
[facility]  Discharge- Diana Godoy 4/12/1933, 89 y.o. female MRN: [id number]  Unit/Bed#: E4 -01 Encounter: [id number]  Primary Care Provider: Ezio Mckenna MD   Date and time admitted to hospital: 3/23/2024  2:07 PM    * Cardiac syncope  Assessment & Plan    Secondary to tachybradycardia syndrome noted to have 4-second conversion pause earlier today  Evaluated by cardiology  Plan for transfer to [facility] for pacemaker placement    LBBB (left bundle branch block)  Assessment & Plan  New left bundle branch block compared to previous.  Unsure if this is rate related versus  persistent.  Repeat 2-hour EKG pending  Chest pain-free with normal troponin  Low suspicion for ACS  Echocardiogram ordered    Hyponatremia  Assessment & Plan  Hypervolemic hyponatremia  Possible diastolic CHF in the setting of uncontrolled A-fib  Monitor BMP    Atrial fibrillation with RVR (HCC)  Assessment & Plan  Atrial fibrillation with RVR  Check TSH with reflex T4  Cardiology input appreciated, digoxin discontinued and Lopressor decreased to 50 mg twice daily as patient started on amiodarone  Xarelto on hold for pacemaker placement    HTN (hypertension)  Assessment & Plan  Hold Norvasc/HCTZ in place of Cardizem/Lasix  Continue metoprolol 50 mg twice daily        Transition of Care Discharge Summary - [facility]    Patient Information: Diana Godoy 89 y.o. female MRN: [id number]  Unit/Bed#: E4 -01 Encounter: [id number]    Discharging Physician / Practitioner: Vazquez Granados MD  PCP: Ezio Mckenna MD  Admission Date: 3/23/2024  Discharge Date: 03/24/24    Disposition:      Inpatient Acute Care Hospital at [facility]      Reason for Admission: syncope    Discharge Diagnoses:     Principal Problem:    Cardiac syncope  Active Problems:    HTN (hypertension)    Atrial fibrillation with RVR (HCC)    Hyponatremia    LBBB (left bundle branch block)  Resolved  "Problems:    * No resolved hospital problems. *      Consultations During Hospital Stay:  None      Procedures Performed:     none    Medication Adjustments and Discharge Medications:  Medication Dosing Tapers - Please refer to Discharge Medication List for details on any medication dosing tapers (if applicable to patient).  Discharge Medication List: See after visit summary for reconciled discharge medications.     Wound Care Recommendations:  When applicable, please see wound care section of After Visit Summary.    Diet Recommendations at Discharge:  Diet -   Fluid Restriction - No Fluid Restriction at Discharge.      Significant Findings / Test Results:     No results found.      Hospital Course:     Diana Godoy is a 89 y.o. female patient who originally presented to the hospital on 3/23/2024 due to syncope at home.  Patient has history of paroxysmal atrial fibrillation, hypertension, hyperlipidemia.  Patient lives alone and is fairly independent.  She was admitted and evaluated by cardiology started on amiodarone, metoprolol was decreased.  Patient was found to have a 4-second conversion pause earlier today.  Patient with tachybradycardia syndrome that was likely causing her symptoms and would benefit from pacemaker.  Patient be transferred to [facility] for pacemaker placement.    Please see above problem list for further details.      Condition at Discharge: good     Discharge Day Visit / Exam:     Subjective: Patient was seen and examined at bedside, sitting in recliner, denies any chest pain or dyspnea.  Did state she had episode of dizziness earlier today.    Vitals: Blood Pressure: 123/78 (03/24/24 1048)  Pulse: 79 (03/24/24 1048)  Temperature: 98.4 °F (36.9 °C) (03/24/24 0849)  Temp Source: Temporal (03/24/24 0849)  Respirations: 18 (03/24/24 1048)  Height: 5' 3\" (160 cm) (03/23/24 1412)  Weight - Scale: 114 kg (250 lb 7.1 oz) (03/24/24 0548)  SpO2: 94 % (03/24/24 0849)    Physical " Exam:    Constitutional: Patient is oriented to person, place and time, no acute distress  HEENT:  Normocephalic, atraumatic  Cardiovascular: Normal S1S2, RRR, No murmurs/rubs/gallops appreciated.  Pulmonary:  Bilateral air entry, No rhonchi/rales/wheezing appreciated  Abdominal: Soft, Bowel sounds present, Non-tender, Non-distended  Extremities:  No cyanosis, clubbing or edema.   Neurological: Cranial nerves II-XII grossly intact, sensation intact, otherwise no focal neurological symptoms.     Discharge instructions/Information to patient and family:   See after visit summary section titled Discharge Instructions for information provided to patient and family.      Planned Readmission: no      Discharge Statement:  I spent 35 minutes discharging the patient. This time was spent on the day of discharge. I had direct contact with the patient on the day of discharge. Greater than 50% of the total time was spent examining patient, answering all patient questions, arranging and discussing plan of care with patient as well as directly providing post-discharge instructions.  Additional time then spent on discharge activities.    ** Please Note: This note has been constructed using a voice recognition system **

## 2024-03-24 NOTE — ASSESSMENT & PLAN NOTE
89 yo F presenting w/ worsening cardiac syncope most likely due to sinus pauses and possible new onset heart failure  Patient appears euvolemic on admission and has demonstrated sinus pauses in [place] ED    TTE obtained 03/25 was remarkable for severely increased left ventricular wall thickness and moderate to severe concentric hypertrophy. The left ventricular ejection fraction is 60%.     S/p PPM placement 3/26.     Plan:  Outpatient follow up with cardiology following pacemaker placement

## 2024-03-24 NOTE — ASSESSMENT & PLAN NOTE
Patient home HTN meds held d/t hyponatremia.   Blood pressure persistently elevated with two episodes >180s/110s, home amlodipine was restarted on  d/t persistently high BPs. .   Holding home hydrochlorothiazide 25 mg p.o. daily since admission    Systolic (24hrs), Av , Min:126 , Max:176   Diastolic (24hrs), Av, Min:55, Max:89      Plan:   Increase Amlodipine to 10 mg from 5 mg qd   Discontinue HCZ in the setting of persistent hyponatremia in the setting of suspected SIADH

## 2024-03-24 NOTE — ASSESSMENT & PLAN NOTE
On admission, suspected hypervolemic hyponatremia, perhaps new onset heart failure, now unlikely given patient appears euvolemic and is persistently hyponatremic following diuresis.   Given serum osm < urine osm in the setting of persistent hyponatremia despite diuresis, likely secondary to SIADH    Lab Results   Component Value Date/Time    OSMOUA 310 03/25/2024 11:32 AM    NAUR 34 03/25/2024 11:32 AM    OSMOLALITSER 274 (L) 03/25/2024 11:27 AM        Recent Labs     03/25/24  1127 03/26/24  0529 03/27/24  0810   SODIUM 128* 130* 131*     Plan:  Limit sodium 2 g  Continue 1.5 L fluid restriction daily for suspected SIADH   F/u with PCP outpatient and consider repeat BMP one week following d/c

## 2024-03-24 NOTE — H&P
INTERNAL MEDICINE RESIDENCY ADMISSION H&P     Name: Diana Godoy   Age & Sex: 89 y.o. female   MRN: [id number]  Unit/Bed#: [facility] 431-01   Encounter: [id number]  Primary Care Provider: Ezio Mckenna MD    Code Status: Level 1 - Full Code  Admission Status: INPATIENT   Disposition: Patient requires Med/Surg with Telemetry    Admit to team: SOD Team C     ASSESSMENT/PLAN     Principal Problem:    Cardiac syncope  Active Problems:    Atrial fibrillation with RVR (HCC)    HTN (hypertension)    Paroxysmal A-fib (HCC)    Morbid obesity (HCC)    Urge incontinence of urine    Pre-diabetes    Hyponatremia    LBBB (left bundle branch block)      * Cardiac syncope  Assessment & Plan  Most likely due to sinus pauses, captured in [place] [facility]  Possible new onset heart failure, though appears euvolemic    Plan:  Morning orthostatics  Check TTE to evaluate for valvulopathies, EF  EP consult  N.p.o. at midnight in case patient is able to be added to schedule on 3/25 for permanent pacemaker    Atrial fibrillation with RVR (HCC)  Assessment & Plan  On Xarelto as outpatient  At home, takes 100 mg metoprolol twice daily, digoxin 125 mcg p.o. daily  RVR from [place] ED is resolved  Hold off on digoxin    Plan:  Continue metoprolol 50 mg twice daily (reduced from home dosing)  Holding Xarelto for procedure  As needed diltiazem 15 mg every 6 hours for heart rate greater than 110; currently sinus rhythm in the 60s  Maintain on telemetry  Follow-up TSH  Cardiology consulted    Paroxysmal A-fib (HCC)  Assessment & Plan  See plan for A-fib with RVR  On Xarelto at home, on hold for procedure    HTN (hypertension)  Assessment & Plan  Hold home hydrochlorothiazide 25 mg p.o. daily  Hold amlodipine 5 mg p.o. daily    Plan:  20 mg IV Lasix daily  Diltiazem 15 mg IV every 6 hours as needed for persistent heart rate greater than 110 (anticipate effect on blood pressure as well)    LBBB (left bundle branch block)  Assessment &  Plan  Likely contributing to patient's sinus pauses  On amiodarone 200 mg 3 times daily as part of amiodarone load to prevent dysrhythmias    Hyponatremia  Assessment & Plan  Suspect hypervolemic hyponatremia, perhaps new onset heart failure    Recent Labs     03/23/24  1427 03/24/24  0550   SODIUM 127* 127*     Plan:  Trial diuresis with 20 mg IV Lasix daily  I's and O's  Limit sodium 2 g    Morbid obesity (HCC)  Assessment & Plan  Lifestyle counseling on discharge        VTE Pharmacologic Prophylaxis: Heparin  VTE Mechanical Prophylaxis: sequential compression device    CHIEF COMPLAINT   No chief complaint on file.     HISTORY OF PRESENT ILLNESS     Patient is a 89-year-old female, history of hypertension, PAF on Xarelto, hyponatremia, tachybradycardia syndrome, who presents with syncope.  Patient states that she has had 3 syncopal episodes in the past month at home.  The first 2 occurred while sitting at the kitchen counter, and the third occurred while in front of the refrigerator.  Patient's family (children) present at the bedside to provide more history.  They state that the patient has been lightheaded and short of breath while at rest intermittently for the past 3 to 4 weeks.  The syncopal episodes seem to have happened more frequently in the last few weeks leading up to admission.  Patient is unable to lay flat to sleep as she says she is a side sleeper, but does not state that she needs to prop herself up with any pillows.  She denies chest pain, palpitations, current shortness of breath she denies fever, weakness.    At outside campus, patient was noted to have new onset left bundle branch block which is new from prior EKGs.  EKG on arrival to that campus also showed A-fib with RVR, which was self-limited.  While in the ED at [place], patient was in and out of A-fib but was not repeatedly in RVR.  Patient was also noted to have a few sinus pauses on telemetry, new from prior cardiology  "history.    Cardiology saw and evaluated patient at [facility].  They recommended to discontinue patient's digoxin and lower Lopressor to 50 mg twice daily from 100 mg twice daily.  They recommended holding hydrochlorothiazide and initiating amiodarone to maintain sinus rhythm and prevent recurrent conversion pauses with a p.o. amiodarone load.    TTE was ordered at [facility] but was not completed in time as patient was already transferred to our [place].  This has since been ordered.  Decision to place temporary wire was deferred at this time by cardiology.    Initial troponins were 10, then 15.  Patient was then transferred to [facility].  Patient currently denies chest pain, shortness of breath, jaw pain, arm pain, numbness.    Patient denies history of tobacco, recreational drugs.  She states that she is a social drinker/drinks at special events, but otherwise does not keep alcohol around the home.  She drinks about 1 cup of coffee per day.    Patient is a confirmed full code.    REVIEW OF SYSTEMS     Review of Systems   Constitutional:  Negative for chills and fever.   Respiratory:  Negative for cough and shortness of breath.    Cardiovascular:  Negative for chest pain and palpitations.   Gastrointestinal:  Negative for diarrhea, nausea and vomiting.   Skin:  Negative for rash.   Neurological:  Negative for weakness, light-headedness, numbness and headaches.     OBJECTIVE     Vitals:    24 1428 24 1816 24 1817 24 1818   BP: 148/74 149/71 158/88 162/86   BP Location:  Right arm Right arm Right arm   Pulse: 57 61 63 67   Resp: 18      Temp: 97.5 °F (36.4 °C)      TempSrc: Oral      SpO2:       Weight: 113 kg (250 lb)      Height: 5' 3\" (1.6 m)         Temperature:   Temp (24hrs), Av.9 °F (36.6 °C), Min:97.5 °F (36.4 °C), Max:98.4 °F (36.9 °C)    Temperature: 97.5 °F (36.4 °C)  Intake & Output:  I/O       None          Weights:   IBW (Ideal Body Weight): 52.4 kg    Body mass index " is 44.29 kg/m².  Weight (last 2 days)       Date/Time Weight    03/24/24 1428 113 (250)          Physical Exam  Vitals reviewed.   Constitutional:       General: She is not in acute distress.     Appearance: She is not ill-appearing, toxic-appearing or diaphoretic.   HENT:      Head: Normocephalic and atraumatic.      Mouth/Throat:      Mouth: Mucous membranes are moist.      Pharynx: Oropharynx is clear.   Eyes:      General: No scleral icterus.     Extraocular Movements: Extraocular movements intact.   Cardiovascular:      Rate and Rhythm: Regular rhythm. Bradycardia present.      Heart sounds: No murmur heard.     No friction rub. No gallop.   Pulmonary:      Effort: Pulmonary effort is normal. No respiratory distress.      Breath sounds: No stridor. No wheezing or rales.   Abdominal:      General: Bowel sounds are normal. There is no distension.      Palpations: There is no mass.      Tenderness: There is no abdominal tenderness. There is no guarding.   Musculoskeletal:         General: Normal range of motion.      Right lower leg: Edema (trace) present.      Left lower leg: Edema (trace) present.   Skin:     General: Skin is warm and dry.      Findings: No rash.   Neurological:      Mental Status: She is alert and oriented to person, place, and time.      Sensory: No sensory deficit.   Psychiatric:         Mood and Affect: Mood normal.         Behavior: Behavior normal.         Thought Content: Thought content normal.      Comments: Somewhat tangential speech, but redirectable       PAST MEDICAL HISTORY     Past Medical History:   Diagnosis Date    Afib (HCC)     Colon polyp 4/19/2018    Formatting of this note might be different from the original. Colonoscopy 4/19/18    Hyperlipidemia     Hypertension     Pre-diabetes 10/10/2022    A1c about 6.2 since 2018    Stroke-like symptoms: vertigo and gait imbalance 9/22/2022    Urge incontinence of urine 5/6/2016     PAST SURGICAL HISTORY     Past Surgical History:  "  Procedure Laterality Date    HYSTERECTOMY      KNEE SURGERY Bilateral 2012     SOCIAL & FAMILY HISTORY     Social History     Substance and Sexual Activity   Alcohol Use Never       Social History     Substance and Sexual Activity   Drug Use Never     Social History     Tobacco Use   Smoking Status Never   Smokeless Tobacco Never     Family History   Problem Relation Age of Onset    Hypertension Mother     Cancer Mother     Stroke Father      LABORATORY DATA     Labs: I have personally reviewed pertinent reports.    Results from last 7 days   Lab Units 03/24/24  0550 03/23/24  1427   WBC Thousand/uL 8.58 7.57   HEMOGLOBIN g/dL 13.7 13.5   HEMATOCRIT % 40.3 40.1   PLATELETS Thousands/uL 265 252   NEUTROS PCT % 67 75   MONOS PCT % 7 8   EOS PCT % 2 1      Results from last 7 days   Lab Units 03/24/24  0550 03/23/24  1427   POTASSIUM mmol/L 3.6 3.5   CHLORIDE mmol/L 90* 92*   CO2 mmol/L 27 28   BUN mg/dL 10 10   CREATININE mg/dL 0.78 0.78   CALCIUM mg/dL 9.5 9.2   ALK PHOS U/L  --  78   ALT U/L  --  15   AST U/L  --  16     Results from last 7 days   Lab Units 03/24/24  0550   MAGNESIUM mg/dL 1.7*                      Micro:  No results found for: \"BLOODCX\", \"URINECX\", \"WOUNDCULT\", \"SPUTUMCULTUR\"  IMAGING & DIAGNOSTIC TESTS     Imaging: I have personally reviewed pertinent reports.    No results found.  EKG, Pathology, and Other Studies: I have personally reviewed pertinent reports.     ALLERGIES     Allergies   Allergen Reactions    Lisinopril Hives     Other reaction(s): Angioedema    Oxycodone-Acetaminophen Rash and GI Intolerance     MEDICATIONS PRIOR TO ARRIVAL     Prior to Admission medications    Medication Sig Start Date End Date Taking? Authorizing Provider   amLODIPine (NORVASC) 5 mg tablet Take 1 tablet (5 mg total) by mouth daily 2/26/24  Yes Ezio Mckenna MD   atorvastatin (LIPITOR) 10 mg tablet Take 1 tablet (10 mg total) by mouth daily 2/26/24  Yes Ezio Mckenna MD   cholecalciferol (VITAMIN D3) " 400 units tablet Take 1 tablet by mouth daily   Yes Historical Provider, MD   cyanocobalamin (VITAMIN B-12) 100 mcg tablet Take by mouth   Yes Historical Provider, MD   digoxin (Digox) 0.125 mg tablet Tablets daily for 4 days, then 1 tablet daily 2/26/24  Yes Ezio Mckenna MD   ELDERBERRY PO Take by mouth   Yes Historical Provider, MD   FISH OIL-KRILL OIL PO Take by mouth   Yes Historical Provider, MD   hydroCHLOROthiazide 25 mg tablet Take 1 tablet (25 mg total) by mouth daily 2/26/24  Yes Ezio Mckenna MD   metoprolol tartrate (LOPRESSOR) 100 mg tablet Take 1 tablet (100 mg total) by mouth 2 (two) times a day 2/26/24  Yes Ezio Mckenna MD   Multiple Vitamin (Daily Value Multivitamin) TABS Take by mouth   Yes Historical Provider, MD   rivaroxaban (XARELTO) 20 mg tablet Take 1 tablet (20 mg total) by mouth daily 2/26/24  Yes Ezio Mckenna MD   zinc gluconate 50 mg tablet Take 50 mg by mouth daily   Yes Historical Provider, MD     MEDICATIONS ADMINISTERED IN LAST 24 HOURS     Medication Administration - last 24 hours from 03/23/2024 1820 to 03/24/2024 1820         Date/Time Order Dose Route Action Action by     03/24/2024 1700 EDT atorvastatin (LIPITOR) tablet 10 mg 10 mg Oral Given Gill Randall RN     03/24/2024 1818 EDT metoprolol tartrate (LOPRESSOR) tablet 50 mg 50 mg Oral Given Gill Randall RN     03/24/2024 1700 EDT amiodarone tablet 200 mg 200 mg Oral Given Gill Randall RN          CURRENT MEDICATIONS     Current Facility-Administered Medications   Medication Dose Route Frequency Provider Last Rate    amiodarone  200 mg Oral TID With Meals Vazquez Granados MD      atorvastatin  10 mg Oral Daily With Dinner Vazquez Granados MD      diltiazem  15 mg Intravenous Q6H PRN Vazquez Granados MD      diphenhydrAMINE  25 mg Oral Q6H PRN Vazquez Granados MD      [START ON 3/25/2024] furosemide  20 mg Intravenous Daily Vazquez Granados MD      heparin (porcine)  5,000 Units Subcutaneous Q8H [facility] Sachi Valente MD       "melatonin  3 mg Oral HS PRN Vazquez Granados MD      metoprolol tartrate  50 mg Oral BID Vazquez Granados MD            diltiazem, 15 mg, Q6H PRN  diphenhydrAMINE, 25 mg, Q6H PRN  melatonin, 3 mg, HS PRN        Admission Time  I spent 45 minutes admitting the patient.  This involved direct patient contact where I performed a full history and physical, reviewing previous records, and reviewing laboratory and other diagnostic studies.    Portions of the record may have been created with voice recognition software.  Occasional wrong word or \"sound a like\" substitutions may have occurred due to the inherent limitations of voice recognition software.  Read the chart carefully and recognize, using context, where substitutions have occurred.    ==  Sachi Valente MD  [facility]  Internal Medicine Residency PGY-3    "

## 2024-03-24 NOTE — ASSESSMENT & PLAN NOTE
New LBBB demonstrated on EKG as of 03/23  Likely contributing to patient's sinus pauses  S/p Amiodarone load, PPM placement      Plan:  Discontinue digoxin  Continue home metoprolol 100 mg outpatient  Continue Xarelto

## 2024-03-24 NOTE — PLAN OF CARE
Problem: SAFETY ADULT  Goal: Patient will remain free of falls  Description: INTERVENTIONS:  - Educate patient/family on patient safety including physical limitations  - Instruct patient to call for assistance with activity   - Consult OT/PT to assist with strengthening/mobility   - Keep Call bell within reach  - Keep bed low and locked with side rails adjusted as appropriate  - Keep care items and personal belongings within reach  - Initiate and maintain comfort rounds  - Make Fall Risk Sign visible to staff  - Offer Toileting every 2 Hours, in advance of need  - Initiate/Maintain bed alarm  - Obtain necessary fall risk management equipment: call bell  - Apply yellow socks and bracelet for high fall risk patients  - Consider moving patient to room near nurses station  Outcome: Progressing  Goal: Maintain or return to baseline ADL function  Description: INTERVENTIONS:  -  Assess patient's ability to carry out ADLs; assess patient's baseline for ADL function and identify physical deficits which impact ability to perform ADLs (bathing, care of mouth/teeth, toileting, grooming, dressing, etc.)  - Assess/evaluate cause of self-care deficits   - Assess range of motion  - Assess patient's mobility; develop plan if impaired  - Assess patient's need for assistive devices and provide as appropriate  - Encourage maximum independence but intervene and supervise when necessary  - Involve family in performance of ADLs  - Assess for home care needs following discharge   - Consider OT consult to assist with ADL evaluation and planning for discharge  - Provide patient education as appropriate  Outcome: Progressing  Goal: Maintains/Returns to pre admission functional level  Description: INTERVENTIONS:  - Perform AM-PAC 6 Click Basic Mobility/ Daily Activity assessment daily.  - Set and communicate daily mobility goal to care team and patient/family/caregiver.   - Collaborate with rehabilitation services on mobility goals if  consulted  - Perform Range of Motion 3 times a day.  - Reposition patient every 2 hours.  - Dangle patient 3 times a day  - Stand patient 3 times a day  - Ambulate patient 3 times a day  - Out of bed to chair 3 times a day   - Out of bed for meals 3  Problem: DISCHARGE PLANNING  Goal: Discharge to home or other facility with appropriate resources  Description: INTERVENTIONS:  - Identify barriers to discharge w/patient and caregiver  - Arrange for needed discharge resources and transportation as appropriate  - Identify discharge learning needs (meds, wound care, etc.)  - Arrange for interpretive services to assist at discharge as needed  - Refer to Case Management Department for coordinating discharge planning if the patient needs post-hospital services based on physician/advanced practitioner order or complex needs related to functional status, cognitive ability, or social support system  Outcome: Progressing     Problem: Knowledge Deficit  Goal: Patient/family/caregiver demonstrates understanding of disease process, treatment plan, medications, and discharge instructions  Description: Complete learning assessment and assess knowledge base.  Interventions:  - Provide teaching at level of understanding  - Provide teaching via preferred learning methods  Outcome: Progressing     Problem: CARDIOVASCULAR - ADULT  Goal: Maintains optimal cardiac output and hemodynamic stability  Description: INTERVENTIONS:  - Monitor I/O, vital signs and rhythm  - Monitor for S/S and trends of decreased cardiac output  - Administer and titrate ordered vasoactive medications to optimize hemodynamic stability  - Assess quality of pulses, skin color and temperature  - Assess for signs of decreased coronary artery perfusion  - Instruct patient to report change in severity of symptoms  Outcome: Progressing  Goal: Absence of cardiac dysrhythmias or at baseline rhythm  Description: INTERVENTIONS:  - Continuous cardiac monitoring, vital signs,  obtain 12 lead EKG if ordered  - Administer antiarrhythmic and heart rate control medications as ordered  - Monitor electrolytes and administer replacement therapy as ordered  Outcome: Progressing    times a day  - Out of bed for toileting  - Record patient progress and toleration of activity level   Outcome: Progressing

## 2024-03-24 NOTE — CASE MANAGEMENT
Case Management Discharge Planning Note    Patient name Diana Godoy  [facility] -* MRN [id number]  : 1933 Date 3/24/2024       Current Admission Date: 3/23/2024  Current Admission Diagnosis:Syncope   Patient Active Problem List    Diagnosis Date Noted    Atrial fibrillation with RVR (HCC) 2024    Hyponatremia 2024    LBBB (left bundle branch block) 2024    Syncope 2023    Neck strain 2023    Pre-diabetes 10/10/2022    Mixed hyperlipidemia 2022    HTN (hypertension) 2022    Paroxysmal A-fib (HCC) 2022    Colon polyp 2018    Morbid obesity (HCC) 2017    Urge incontinence of urine 2016      LOS (days): 1  Geometric Mean LOS (GMLOS) (days):   Days to GMLOS:     OBJECTIVE:  Risk of Unplanned Readmission Score: 10.27         Current admission status: Inpatient   Preferred Pharmacy:   [place] [address]  Phone: [phone] Fax: [phone]    Primary Care Provider: Ezio Mckenna MD    Primary Insurance: [facility]  Secondary Insurance:     DISCHARGE DETAILS:    Discharge planning discussed with:: Patient  Freedom of Choice: Yes  Comments - Freedom of Choice: Pt in agreement with transfer to [facility]    Discharge Destination Plan:: Acute Hospital Transfer  Transport at Discharge : ALS Ambulance  Dispatcher Contacted: Yes  Number/Name of Dispatcher: ROUNDTRIP  Transported by (Company and Unit #): [employer]  ETA of Transport (Date): 24  ETA of Transport (Time): 1315  Transport Service Arrived: Yes  Transfer Mode: Stretcher  Accompanied by: EMS personnel  Transfer Equipment: ALS devices and medications    Additional Comments: CM informed Pt will be transferred to [facility] for Pace Maker install. ALS set up by PACs. CM completed Medical Necessity and provided to transport team. Pt transferring at 1315.

## 2024-03-24 NOTE — CONSULTS
Consult - Cardiology   Diana Godoy 89 y.o. female MRN: [id number]  Unit/Bed#: E4 -01 Encounter: [id number]        Reason For Consult: Atrial fibrillation, syncope               Assessment:  Syncope: correlated to AV block/conversion pause  Tachybradycardia syndrome  Paroxysmal atrial fibrillation: Recurrence with RVR   - Anticoagulation: Xarelto   - AV blocking Rx PTA: Digoxin 125 mcg daily, Lopressor 100 mg twice daily  Left bundle branch block  Essential hypertension   - O/p Rx: amlodipine 5 mg daily, HCTZ 25 mg daily, Lopressor 100 mg twice daily  Hyperlipidemia    Other    * Prediabetes    * Hx strokelike symptoms -vertigo, ataxia (9/2022)    * Obesity: BMI 45       Discussion / Plan:  # Patient with known history of paroxysmal atrial fibrillation that felt herself to be in atrial fibrillation prior to a brief syncopal event with brief prodrome of lightheadedness and flushing without sequelae.  A similar symptom complex was experienced in the hospital with a temporal relationship to conversion pause followed by brief idioventricular rhythm before return of sinus rhythm with a low normal rate.               Check echocardiogram  Will initiate patient on amiodarone hopeful to maintain sinus rhythm (and avoid conversion pause)  Decrease Lopressor ~~> 100 mg twice daily to 50 mg twice daily  Discontinue digoxin  Hold Xarelto  Pacemaker indicated and discussed with the patient who is agreeable.  Case discussed with electrophysiology who asked to initiate transfer to the [facility] for device implant -- [facility] attending made aware to initiate transfer  Discussed with attending with plan to defer temporary wire at this point        History Of Present Illness:  Diana Godoy is a 89-year-old patient of Dr. Juan Mckenna (PCP) and prior care by [facility] cardiologist Dr. Arnoldo Chávez until his half-way in 2022.. She has had prior ED and hospital care in our network but not  previously seen by [facility].     This patient has known history of paroxysmal atrial fibrillation.  She reports historically her burden of AF has been modest though she feels this is increased in the last year.  Since the beginning of the year she has had been having at least several episodes per month which she believes have further increased during the last month to several times per week.  Reports episodes rarely last more than a few hours.  She indicates she is for the most part tolerant of her episodes of AF though they are often accompanied by some fatigue and dyspnea.    Yesterday, 3/23/2024, the patient came to the emergency department reporting an episode of near syncope.  In the midmorning she recognized herself to have recurrent atrial fibrillation with increased ventricular rate but as above she was for the most part tolerant of it.  Just after her midday meal she went and sat on the couch where just after sitting she had acute feelings of lightheadedness and flushing then a brief loss of consciousness.  She denied any chest pain or other premonitory symptoms, incontinence, involuntary movements, or sequelae.  Moments after this episode the patient's daughter had come into the house with the patient reporting her symptoms to her.  The daughter called her brother who then checked his mother's heart rate indicating it was again rapid-reportedly 126.  Because of these events and the patient's recurrent tachycardia they called an ambulance.    ECG on arrival showed atrial fibrillation with increased ventricular rate and a left bundle branch block.  MI profile has been unremarkable with normal high-sensitivity troponin levels x2.  Chest x-ray was unremarkable as is chemistry with exception of hyponatremia (127).  This morning the patient had an episode of symptoms similar to those that prompted her hospital presentation.  She was at rest with acute feelings of lightheadedness near syncope-this time without LOC.   Telemetry at the time shows AF converting to sinus rhythm.  She had a pause of just over 4 seconds followed by a brief idioventricular rhythm before stabilizing with a sinus rate in the low 60s.          Past Medical History:        Past Medical History:   Diagnosis Date    Afib (HCC)     Colon polyp 4/19/2018    Formatting of this note might be different from the original. Colonoscopy 4/19/18    Hyperlipidemia     Hypertension     Pre-diabetes 10/10/2022    A1c about 6.2 since 2018    Stroke-like symptoms: vertigo and gait imbalance 9/22/2022    Urge incontinence of urine 5/6/2016      Past Surgical History:   Procedure Laterality Date    HYSTERECTOMY      KNEE SURGERY Bilateral 2012        Allergy:        Allergies   Allergen Reactions    Lisinopril Hives     Other reaction(s): Angioedema    Oxycodone-Acetaminophen Rash and GI Intolerance       Medications:       Prior to Admission medications    Medication Sig Start Date End Date Taking? Authorizing Provider   amLODIPine (NORVASC) 5 mg tablet Take 1 tablet (5 mg total) by mouth daily 2/26/24  Yes Ezio Mckenna MD   atorvastatin (LIPITOR) 10 mg tablet Take 1 tablet (10 mg total) by mouth daily 2/26/24  Yes Ezio Mckenna MD   cholecalciferol (VITAMIN D3) 400 units tablet Take 1 tablet by mouth daily   Yes Historical Provider, MD   cyanocobalamin (VITAMIN B-12) 100 mcg tablet Take by mouth   Yes Historical Provider, MD   digoxin (Digox) 0.125 mg tablet Tablets daily for 4 days, then 1 tablet daily 2/26/24  Yes Ezio Mckenna MD   hydroCHLOROthiazide 25 mg tablet Take 1 tablet (25 mg total) by mouth daily 2/26/24  Yes Ezio Mckenna MD   metoprolol tartrate (LOPRESSOR) 100 mg tablet Take 1 tablet (100 mg total) by mouth 2 (two) times a day 2/26/24  Yes Ezio Mckenna MD   Multiple Vitamin (Daily Value Multivitamin) TABS Take by mouth   Yes Historical Provider, MD   rivaroxaban (XARELTO) 20 mg tablet Take 1 tablet (20 mg total) by mouth daily 2/26/24  Yes Ezio  MD Clarisa   ELDERBERRY PO Take by mouth    Historical Provider, MD   FISH OIL-KRILL OIL PO Take by mouth    Historical Provider, MD   zinc gluconate 50 mg tablet Take 50 mg by mouth daily    Historical Provider, MD       Family History:     Family History   Problem Relation Age of Onset    Hypertension Mother     Cancer Mother     Stroke Father         Social History:       Social History     Socioeconomic History    Marital status: Unknown     Spouse name: None    Number of children: None    Years of education: None    Highest education level: None   Occupational History    None   Tobacco Use    Smoking status: Never    Smokeless tobacco: Never   Vaping Use    Vaping status: Never Used   Substance and Sexual Activity    Alcohol use: Never    Drug use: Never    Sexual activity: None   Other Topics Concern    None   Social History Narrative     since  after 54 year marriage.      Three children.      Six grandchildren.  Seven great grandchildren.      Lives alone.  In her own house [place].  2 daughters live very close.    Drives.    -has a 92-year-old sister, 82-year-old brother and a 79-year-old sister.  Lost 1 sibling. Who  at 85.     Social Determinants of Health     Financial Resource Strain: Low Risk  (10/30/2023)    Overall Financial Resource Strain (CARDIA)     Difficulty of Paying Living Expenses: Not hard at all   Food Insecurity: No Food Insecurity (8/15/2023)    Hunger Vital Sign     Worried About Running Out of Food in the Last Year: Never true     Ran Out of Food in the Last Year: Never true   Transportation Needs: No Transportation Needs (10/30/2023)    PRAPARE - Transportation     Lack of Transportation (Medical): No     Lack of Transportation (Non-Medical): No   Physical Activity: Not on file   Stress: Not on file   Social Connections: Not on file   Intimate Partner Violence: Not on file   Housing Stability: Low Risk  (8/15/2023)    Housing Stability Vital Sign      Unable to Pay for Housing in the Last Year: No     Number of Places Lived in the Last Year: 1     Unstable Housing in the Last Year: No       ROS:  Symptoms per HPI  Ambulates without an assist device  Continues to live independently despite her age.  She typically tolerant of 4 metabolic equivalent activities.    Some mild chronic weakness of the left upper extremity  The remainder of the review of systems is negative    Exam:  General:  Alert, appropriately conversant quite pleasant and appearing comfortable and less than her chronologic age  Head: Normocephalic, atraumatic.  Eyes:  EOMI. Pupils - equal, round, reactive to accomodation.  No icterus.  Normal Conjunctiva.   Oropharynx: Moist without lesion  Neck:  No gross bruit, JVD, thyromegaly, or lymphadenopathy  Heart:  Regular with controlled rate.  No rub nor pathologic murmur  Lungs:  Clear without rales/rhonchi/wheeze  Abdomen: Protuberant.  Soft and nontender with normal bowel sounds. No organomegaly or mass  Lower Limbs:  No edema  Pulses:  RLE - DP:  1-2+                LLE - DP:  1-2+  Musculoskeletal: Independent movement of limbs observed, Formal ROM and strength eval not performed though some decreased range of motion and mild reduction in strength of the left upper extremity was observed  Neurologic:    Oriented to: person, place, situation.     Cranial Nerves: grossly intact - vision, smell, taste, and hearing were not tested.     Motor function: grossly normal, symmetric   Sensation: Was not tested      Vitals:    03/23/24 2219 03/24/24 0322 03/24/24 0548 03/24/24 0722   BP: 142/65 115/67  125/78   BP Location: Right arm Left arm  Right arm   Pulse: (!) 118 (!) 117  81   Resp: 20 20  20   Temp: 97.7 °F (36.5 °C) 98.1 °F (36.7 °C)  98 °F (36.7 °C)   TempSrc: Temporal Temporal  Temporal   SpO2: 93% 95%  94%   Weight:   114 kg (250 lb 7.1 oz)    Height:               DATA:      Telemetry:        ...Continuous-->         -----------------------------------------------------------------------------------------------------------------------------------------------  Echocardiogram    -----------------------------------------------------------------------------------------------------------------------------------------------  Ischemic Testing:  Stress test         Catheterization    -----------------------------------------------------------------------------------------------------------------------------------------------  Weights:    Wt Readings from Last 20 Encounters:   03/24/24 114 kg (250 lb 7.1 oz)   02/26/24 115 kg (253 lb)   10/30/23 115 kg (253 lb 6.4 oz)   08/16/23 114 kg (251 lb)   07/14/23 115 kg (254 lb 6.4 oz)   01/16/23 112 kg (247 lb 9.6 oz)   10/10/22 113 kg (250 lb 3.2 oz)   09/23/22 113 kg (249 lb 1.9 oz)   05/06/16 111 kg (245 lb)   05/01/15 112 kg (246 lb)   , Body mass index is 44.36 kg/m².         Lab Studies:               Results from last 7 days   Lab Units 03/24/24  0550 03/23/24  1427   WBC Thousand/uL 8.58 7.57   HEMOGLOBIN g/dL 13.7 13.5   HEMATOCRIT % 40.3 40.1   PLATELETS Thousands/uL 265 252   ,   Results from last 7 days   Lab Units 03/24/24  0550 03/23/24  1427   POTASSIUM mmol/L 3.6 3.5   CHLORIDE mmol/L 90* 92*   CO2 mmol/L 27 28   BUN mg/dL 10 10   CREATININE mg/dL 0.78 0.78   CALCIUM mg/dL 9.5 9.2   ALK PHOS U/L  --  78   ALT U/L  --  15   AST U/L  --  16

## 2024-03-24 NOTE — Clinical Note
Prepped: left chest. Prepped with: ChloraPrep. The patient was draped. Bilateral wrist restraints applied for patient safety

## 2024-03-24 NOTE — Clinical Note
The PACER GENERATOR GEE XT SR MRI SURESCAN - [id number] device was inserted. The leads were placed into the connector and visually verified to be in correct position. Injury current obtained.

## 2024-03-25 ENCOUNTER — ANESTHESIA EVENT (INPATIENT)
Dept: NON INVASIVE DIAGNOSTICS | Facility: HOSPITAL | Age: 89
DRG: 242 | End: 2024-03-25
Payer: COMMERCIAL

## 2024-03-25 LAB
ANION GAP SERPL CALCULATED.3IONS-SCNC: 9 MMOL/L (ref 4–13)
ANION GAP SERPL CALCULATED.3IONS-SCNC: 9 MMOL/L (ref 4–13)
AORTIC ROOT: 3.6 CM
APICAL FOUR CHAMBER EJECTION FRACTION: 68 %
ASCENDING AORTA: 3.5 CM
BASOPHILS # BLD AUTO: 0.01 THOUSANDS/ÂΜL (ref 0–0.1)
BASOPHILS NFR BLD AUTO: 0 % (ref 0–1)
BSA FOR ECHO PROCEDURE: 2.13 M2
BUN SERPL-MCNC: 12 MG/DL (ref 5–25)
BUN SERPL-MCNC: 13 MG/DL (ref 5–25)
CALCIUM SERPL-MCNC: 8.7 MG/DL (ref 8.4–10.2)
CALCIUM SERPL-MCNC: 8.9 MG/DL (ref 8.4–10.2)
CHLORIDE SERPL-SCNC: 88 MMOL/L (ref 96–108)
CHLORIDE SERPL-SCNC: 90 MMOL/L (ref 96–108)
CO2 SERPL-SCNC: 29 MMOL/L (ref 21–32)
CO2 SERPL-SCNC: 29 MMOL/L (ref 21–32)
CREAT SERPL-MCNC: 0.71 MG/DL (ref 0.6–1.3)
CREAT SERPL-MCNC: 0.78 MG/DL (ref 0.6–1.3)
CREAT UR-MCNC: 66.6 MG/DL
E WAVE DECELERATION TIME: 180 MS
E/A RATIO: 1.15
EOSINOPHIL # BLD AUTO: 0.19 THOUSAND/ÂΜL (ref 0–0.61)
EOSINOPHIL NFR BLD AUTO: 3 % (ref 0–6)
ERYTHROCYTE [DISTWIDTH] IN BLOOD BY AUTOMATED COUNT: 12.9 % (ref 11.6–15.1)
FRACTIONAL SHORTENING: 32 (ref 28–44)
GFR SERPL CREATININE-BSD FRML MDRD: 67 ML/MIN/1.73SQ M
GFR SERPL CREATININE-BSD FRML MDRD: 75 ML/MIN/1.73SQ M
GLUCOSE SERPL-MCNC: 107 MG/DL (ref 65–140)
GLUCOSE SERPL-MCNC: 123 MG/DL (ref 65–140)
GLUCOSE SERPL-MCNC: 169 MG/DL (ref 65–140)
HCT VFR BLD AUTO: 37.5 % (ref 34.8–46.1)
HGB BLD-MCNC: 12.6 G/DL (ref 11.5–15.4)
IMM GRANULOCYTES # BLD AUTO: 0.03 THOUSAND/UL (ref 0–0.2)
IMM GRANULOCYTES NFR BLD AUTO: 1 % (ref 0–2)
INTERVENTRICULAR SEPTUM IN DIASTOLE (PARASTERNAL SHORT AXIS VIEW): 1.8 CM
INTERVENTRICULAR SEPTUM: 1.8 CM (ref 0.6–1.1)
LAAS-AP2: 24.7 CM2
LAAS-AP4: 22.5 CM2
LEFT ATRIUM SIZE: 3.5 CM
LEFT ATRIUM VOLUME (MOD BIPLANE): 68 ML
LEFT ATRIUM VOLUME INDEX (MOD BIPLANE): 31.9 ML/M2
LEFT INTERNAL DIMENSION IN SYSTOLE: 2.5 CM (ref 2.1–4)
LEFT VENTRICULAR INTERNAL DIMENSION IN DIASTOLE: 3.7 CM (ref 3.5–6)
LEFT VENTRICULAR POSTERIOR WALL IN END DIASTOLE: 1.8 CM
LEFT VENTRICULAR STROKE VOLUME: 36 ML
LVSV (TEICH): 36 ML
LYMPHOCYTES # BLD AUTO: 1.78 THOUSANDS/ÂΜL (ref 0.6–4.47)
LYMPHOCYTES NFR BLD AUTO: 28 % (ref 14–44)
MAGNESIUM SERPL-MCNC: 2.1 MG/DL (ref 1.9–2.7)
MCH RBC QN AUTO: 29.3 PG (ref 26.8–34.3)
MCHC RBC AUTO-ENTMCNC: 33.6 G/DL (ref 31.4–37.4)
MCV RBC AUTO: 87 FL (ref 82–98)
MONOCYTES # BLD AUTO: 0.53 THOUSAND/ÂΜL (ref 0.17–1.22)
MONOCYTES NFR BLD AUTO: 8 % (ref 4–12)
MV E'TISSUE VEL-LAT: 6 CM/S
MV E'TISSUE VEL-SEP: 5 CM/S
MV PEAK A VEL: 0.62 M/S
MV PEAK E VEL: 71 CM/S
MV STENOSIS PRESSURE HALF TIME: 52 MS
MV VALVE AREA P 1/2 METHOD: 4.23
NEUTROPHILS # BLD AUTO: 3.93 THOUSANDS/ÂΜL (ref 1.85–7.62)
NEUTS SEG NFR BLD AUTO: 60 % (ref 43–75)
NRBC BLD AUTO-RTO: 0 /100 WBCS
OSMOLALITY UR/SERPL-RTO: 274 MMOL/KG (ref 282–298)
OSMOLALITY UR: 310 MMOL/KG
PLATELET # BLD AUTO: 218 THOUSANDS/UL (ref 149–390)
PMV BLD AUTO: 8.9 FL (ref 8.9–12.7)
POTASSIUM SERPL-SCNC: 3.3 MMOL/L (ref 3.5–5.3)
POTASSIUM SERPL-SCNC: 3.7 MMOL/L (ref 3.5–5.3)
RA PRESSURE ESTIMATED: 3 MMHG
RBC # BLD AUTO: 4.3 MILLION/UL (ref 3.81–5.12)
RIGHT ATRIUM AREA SYSTOLE A4C: 11.4 CM2
RIGHT VENTRICLE ID DIMENSION: 3.1 CM
RV PSP: 34 MMHG
SL CV LEFT ATRIUM LENGTH A2C: 6.8 CM
SL CV LV EF: 60
SL CV PED ECHO LEFT VENTRICLE DIASTOLIC VOLUME (MOD BIPLANE) 2D: 58 ML
SL CV PED ECHO LEFT VENTRICLE SYSTOLIC VOLUME (MOD BIPLANE) 2D: 22 ML
SODIUM 24H UR-SCNC: 34 MOL/L
SODIUM SERPL-SCNC: 126 MMOL/L (ref 135–147)
SODIUM SERPL-SCNC: 128 MMOL/L (ref 135–147)
TR MAX PG: 31 MMHG
TR PEAK VELOCITY: 2.8 M/S
TRICUSPID ANNULAR PLANE SYSTOLIC EXCURSION: 2 CM
TRICUSPID VALVE PEAK REGURGITATION VELOCITY: 2.8 M/S
UUN 24H UR-MCNC: 408 MG/DL
WBC # BLD AUTO: 6.47 THOUSAND/UL (ref 4.31–10.16)

## 2024-03-25 PROCEDURE — 80048 BASIC METABOLIC PNL TOTAL CA: CPT

## 2024-03-25 PROCEDURE — 93306 TTE W/DOPPLER COMPLETE: CPT | Performed by: INTERNAL MEDICINE

## 2024-03-25 PROCEDURE — 82948 REAGENT STRIP/BLOOD GLUCOSE: CPT

## 2024-03-25 PROCEDURE — 93306 TTE W/DOPPLER COMPLETE: CPT

## 2024-03-25 PROCEDURE — 84300 ASSAY OF URINE SODIUM: CPT

## 2024-03-25 PROCEDURE — 83930 ASSAY OF BLOOD OSMOLALITY: CPT

## 2024-03-25 PROCEDURE — 99232 SBSQ HOSP IP/OBS MODERATE 35: CPT | Performed by: INTERNAL MEDICINE

## 2024-03-25 PROCEDURE — 84540 ASSAY OF URINE/UREA-N: CPT

## 2024-03-25 PROCEDURE — 82570 ASSAY OF URINE CREATININE: CPT

## 2024-03-25 PROCEDURE — 83935 ASSAY OF URINE OSMOLALITY: CPT

## 2024-03-25 PROCEDURE — 85025 COMPLETE CBC W/AUTO DIFF WBC: CPT

## 2024-03-25 PROCEDURE — 83735 ASSAY OF MAGNESIUM: CPT

## 2024-03-25 PROCEDURE — 99223 1ST HOSP IP/OBS HIGH 75: CPT | Performed by: INTERNAL MEDICINE

## 2024-03-25 RX ORDER — SENNOSIDES 8.6 MG
1 TABLET ORAL
Status: DISCONTINUED | OUTPATIENT
Start: 2024-03-25 | End: 2024-03-27 | Stop reason: HOSPADM

## 2024-03-25 RX ORDER — CEFAZOLIN SODIUM 2 G/50ML
2000 SOLUTION INTRAVENOUS ONCE
Status: COMPLETED | OUTPATIENT
Start: 2024-03-26 | End: 2024-03-26

## 2024-03-25 RX ORDER — BISACODYL 10 MG
10 SUPPOSITORY, RECTAL RECTAL DAILY PRN
Status: DISCONTINUED | OUTPATIENT
Start: 2024-03-25 | End: 2024-03-27

## 2024-03-25 RX ORDER — AMLODIPINE BESYLATE 5 MG/1
5 TABLET ORAL DAILY
Status: DISCONTINUED | OUTPATIENT
Start: 2024-03-25 | End: 2024-03-27

## 2024-03-25 RX ORDER — POLYETHYLENE GLYCOL 3350 17 G/17G
17 POWDER, FOR SOLUTION ORAL DAILY PRN
Status: DISCONTINUED | OUTPATIENT
Start: 2024-03-25 | End: 2024-03-27 | Stop reason: HOSPADM

## 2024-03-25 RX ADMIN — METOPROLOL TARTRATE 50 MG: 50 TABLET, FILM COATED ORAL at 09:09

## 2024-03-25 RX ADMIN — HEPARIN SODIUM 5000 UNITS: 5000 INJECTION INTRAVENOUS; SUBCUTANEOUS at 13:19

## 2024-03-25 RX ADMIN — AMIODARONE HYDROCHLORIDE 200 MG: 200 TABLET ORAL at 09:09

## 2024-03-25 RX ADMIN — ATORVASTATIN CALCIUM 10 MG: 10 TABLET, FILM COATED ORAL at 17:16

## 2024-03-25 RX ADMIN — AMIODARONE HYDROCHLORIDE 200 MG: 200 TABLET ORAL at 11:19

## 2024-03-25 RX ADMIN — AMLODIPINE BESYLATE 5 MG: 5 TABLET ORAL at 11:19

## 2024-03-25 RX ADMIN — METOPROLOL TARTRATE 50 MG: 50 TABLET, FILM COATED ORAL at 17:16

## 2024-03-25 RX ADMIN — FUROSEMIDE 20 MG: 10 INJECTION, SOLUTION INTRAMUSCULAR; INTRAVENOUS at 09:09

## 2024-03-25 RX ADMIN — BISACODYL 10 MG: 10 SUPPOSITORY RECTAL at 19:49

## 2024-03-25 NOTE — WOUND OSTOMY CARE
Consult Note - Wound   Diana Godoy 89 y.o. female MRN: [id number]  Unit/Bed#: [facility] 431-01 Encounter: [id number]        History and Present Illness:  Patient is a 89 yo female that was admitted to [facility] for treatment of cardiac syncope. Patient has a PMH of hypertension, PAF, hyponatremia, tachybradycardia syndrome Patient is a min assist for turning and repositioning. Patient is continent of bowel and bladder. On assessment, patient is lying on regular mattress.      Wound Care was consulted for blanchable redness on sacrum      Assessment Findings:   B/L heels are dry intact and haley with no skin loss or wounds present. Recommend preventative Hydraguard Cream and proper offloading/ repositioning.      B/L sacro-buttocks is dry, intact, pink in color and blanches. No skin loss or wounds present. Recommend preventative silicone bordered foam dressing to area.     No induration, fluctuance, odor, warmth/temperature differences, redness, or purulence noted to the above noted wounds and skin areas assessed. New dressings applied per orders listed below. Patient tolerated well- no s/s of non-verbal pain or discomfort observed during the encounter. Bedside nurse aware of plan of care. See flow sheets for more detailed assessment findings.      Orders listed below and wound care will sign off, call or tiger text with questions.     Skin Care Plan:  1-Cleanse Sacro-Buttocks with soap and water. Pat dry. Apply Silicone Border Foam (Mepilex) to area. Danny with P for Prevention and change every 3 days or PRN soilage/displacement. Peel back and inspect Q-shift.  2-Turn/reposition q2h or when medically stable for pressure re-distribution on skin .  3-Elevate heels to offload pressure.  4-Moisturize skin daily with skin nourishing cream  5-Ehob cushion in chair when out of bed.  6-Preventative Hydraguard to bilateral heels BID and PRN.       Wounds:  Wound 03/25/24 Other (comment) Buttocks (Active)   Wound Image    03/25/24 1338   Wound Description Intact 03/25/24 1338   Cristin-wound Assessment Intact 03/25/24 1338   Wound Length (cm) 0 cm 03/25/24 1338   Wound Width (cm) 0 cm 03/25/24 1338   Wound Depth (cm) 0 cm 03/25/24 1338   Wound Surface Area (cm^2) 0 cm^2 03/25/24 1338   Wound Volume (cm^3) 0 cm^3 03/25/24 1338   Calculated Wound Volume (cm^3) 0 cm^3 03/25/24 1338   Dressing Foam, Silicon (eg. Allevyn, etc) 03/25/24 1338               Sofia Grande RN, BSN, CWOCN

## 2024-03-25 NOTE — DISCHARGE INSTR - OTHER ORDERS
Skin Care Plan:  1-Cleanse Sacro-Buttocks with soap and water. Pat dry. Apply Silicone Border Foam (Mepilex) to area. Danny with P for Prevention and change every 3 days or PRN soilage/displacement. Peel back and inspect Q-shift.  2-Turn/reposition q2h or when medically stable for pressure re-distribution on skin .  3-Elevate heels to offload pressure.  4-Moisturize skin daily with skin nourishing cream  5-Ehob cushion in chair when out of bed.  6-Preventative Hydraguard to bilateral heels BID and PRN.

## 2024-03-25 NOTE — CONSULTS
Consultation - Cardiology  Diana Godoy 89 y.o. female MRN: [id number]  Unit/Bed#: [facility] 431-01 Encounter: [id number]      Inpatient consult to Electrophysiology  Consult performed by: Harley Lynn MD  Consult ordered by: Sachi Valente MD          History of Present Illness   Physician Requesting Consult: Pete Sanches MD  Reason for Consult / Principal Problem: afib with syncope during conversion pauses.     Assessment/Plan   Assessment/Plan:  89F with PMH of paroxsymal atrial fibrillation, HTN, HLD, who had two episodes of syncope at home and was found to have have afib with long symptomatic conversion pauses at [facility] so transferred to [place] for pacemaker.     #afib with symptomatic conversion pauses  Hx of paroxsymal afib who was managed on digoxin 125mcg daily, metoprolol 100mg BID, and rivaroxaban 20mg daily. Now presenting with symptomatic conversion pauses and syncope. Discussed pacemaker options with patient. The patient is currently in NSR.   -PPM tomorrow (micra vs. Single chamber PPM)  -currently on metoprolol 50mg BID (reduced dose from home dose)  -hold home digoxin  -amiodarone was started at [facility], will hold for now.   -hold home rivaroxaban, will restart after PPM    #HTN:   SBP 130s-180s. Holding meds as above. Still on amlodipine 5mg.  Will restart some meds after PPM. Hctz should be stopped given hyponatremia.     #hyponatremia: Na 128 today. Has been 130s on recent check. Unclear etiology. She takes HCTZ which could be contributing and should be stopped.       HPI: Diana Godoy is a 89 y.o. year old female with PMH of paroxsymal atrial fibrillation, HTN, HLD, who had two episodes of syncope at home and was found to have have afib with long symptomatic conversion pauses at [facility] so transferred to [place] for consideration of pacemaker.     The patient reports two episodes of syncope.  She had a brief episode of lightheadedness and flushing and then  a short loss of consciousness.  At [place] the patient was found to have symptomatic conversion pauses.      The patient's digoxin was held. And she was started on PO amiodarone.     Historical Information   Past Medical History:   Diagnosis Date    Afib (HCC)     Colon polyp 4/19/2018    Formatting of this note might be different from the original. Colonoscopy 4/19/18    Hyperlipidemia     Hypertension     Pre-diabetes 10/10/2022    A1c about 6.2 since 2018    Stroke-like symptoms: vertigo and gait imbalance 9/22/2022    Urge incontinence of urine 5/6/2016     Past Surgical History:   Procedure Laterality Date    HYSTERECTOMY      KNEE SURGERY Bilateral 2012     Social History     Substance and Sexual Activity   Alcohol Use Never     Social History     Substance and Sexual Activity   Drug Use Never     Social History     Tobacco Use   Smoking Status Never    Passive exposure: Past (father smoked)   Smokeless Tobacco Never     Family History: father had stroke    Meds/Allergies   Hospital Medications:   Current Facility-Administered Medications   Medication Dose Route Frequency    amiodarone tablet 200 mg  200 mg Oral TID With Meals    amLODIPine (NORVASC) tablet 5 mg  5 mg Oral Daily    atorvastatin (LIPITOR) tablet 10 mg  10 mg Oral Daily With Dinner    [START ON 3/26/2024] ceFAZolin (ANCEF) IVPB (premix in dextrose) 2,000 mg 50 mL  2,000 mg Intravenous Once    diltiazem (CARDIZEM) injection 15 mg  15 mg Intravenous Q6H PRN    diphenhydrAMINE (BENADRYL) tablet 25 mg  25 mg Oral Q6H PRN    furosemide (LASIX) injection 20 mg  20 mg Intravenous Daily    heparin (porcine) subcutaneous injection 5,000 Units  5,000 Units Subcutaneous Q8H PEDRO    melatonin tablet 3 mg  3 mg Oral HS PRN    metoprolol tartrate (LOPRESSOR) tablet 50 mg  50 mg Oral BID     Home Medications:   Medications Prior to Admission   Medication    amLODIPine (NORVASC) 5 mg tablet    atorvastatin (LIPITOR) 10 mg tablet    cholecalciferol (VITAMIN D3)  "400 units tablet    cyanocobalamin (VITAMIN B-12) 100 mcg tablet    digoxin (Digox) 0.125 mg tablet    ELDERBERRY PO    FISH OIL-KRILL OIL PO    hydroCHLOROthiazide 25 mg tablet    metoprolol tartrate (LOPRESSOR) 100 mg tablet    Multiple Vitamin (Daily Value Multivitamin) TABS    rivaroxaban (XARELTO) 20 mg tablet    zinc gluconate 50 mg tablet       Allergies   Allergen Reactions    Lisinopril Hives     Other reaction(s): Angioedema    Oxycodone-Acetaminophen Rash and GI Intolerance       Objective   Vitals: Blood pressure 131/62, pulse 66, temperature 97.5 °F (36.4 °C), temperature source Oral, resp. rate 17, height 5' 3\" (1.6 m), weight 113 kg (250 lb 3.6 oz), SpO2 93%.  Orthostatic Blood Pressures      Flowsheet Row Most Recent Value   Blood Pressure 131/62 filed at 03/25/2024 1502   Patient Position - Orthostatic VS Standing - Orthostatic VS filed at 03/24/2024 1818              Intake/Output Summary (Last 24 hours) at 3/25/2024 1542  Last data filed at 3/25/2024 1453  Gross per 24 hour   Intake 120 ml   Output 402 ml   Net -282 ml       Invasive Devices       Peripheral Intravenous Line  Duration             Peripheral IV 03/25/24 Left Antecubital <1 day              Drain  Duration             External Urinary Catheter <1 day                    Review of Systems:  ROS  ROS as noted above, otherwise 12 point review of systems was performed and is negative.     Physical Exam:   Physical Exam  Constitutional:       Appearance: Normal appearance.   HENT:      Head: Normocephalic and atraumatic.   Neck:      Vascular: No JVD   Cardiovascular:      Rate and Rhythm: RRR, no murmurs  Pulmonary:      Effort: CTA-b  Abdominal:      General: Abdomen is flat.  Musculoskeletal:     No edema  Skin:     General: Skin is warm.   Neurological:      Mental Status: she is alert and oriented to person, place, and time.   Psychiatric:         Behavior: Behavior normal.       Lab Results: I have personally reviewed pertinent lab " results.    Results from last 7 days   Lab Units 03/25/24  0450 03/24/24  0550 03/23/24  1427   WBC Thousand/uL 6.47 8.58 7.57   HEMOGLOBIN g/dL 12.6 13.7 13.5   HEMATOCRIT % 37.5 40.3 40.1   PLATELETS Thousands/uL 218 265 252     Results from last 7 days   Lab Units 03/25/24  1127 03/25/24  0450 03/24/24  0550   POTASSIUM mmol/L 3.3* 3.7 3.6   CHLORIDE mmol/L 90* 88* 90*   CO2 mmol/L 29 29 27   BUN mg/dL 12 13 10   CREATININE mg/dL 0.71 0.78 0.78   CALCIUM mg/dL 8.9 8.7 9.5         Results from last 7 days   Lab Units 03/25/24  0450 03/24/24  0550   MAGNESIUM mg/dL 2.1 1.7*

## 2024-03-25 NOTE — PROGRESS NOTES
INTERNAL MEDICINE RESIDENCY PROGRESS NOTE     Name: Diana Godoy   Age & Sex: 89 y.o. female   MRN: [id number]  Unit/Bed#: [facility] 431-01   Encounter: [id number]  Team: SOD Team C     PATIENT INFORMATION     Name: Diana Godoy   Age & Sex: 89 y.o. female   MRN: [id number]  Hospital Stay Days: 1    ASSESSMENT/PLAN     Principal Problem:    Cardiac syncope  Active Problems:    HTN (hypertension)    Paroxysmal A-fib (HCC)    Morbid obesity (HCC)    Urge incontinence of urine    Pre-diabetes    Atrial fibrillation with RVR ([facility])    Hyponatremia    LBBB (left bundle branch block)      LBBB (left bundle branch block)  Assessment & Plan  New LBBB demonstrated on EKG as of 03/23  Likely contributing to patient's sinus pauses      Plan:  Amiodarone 200 mg TID as part of amiodarone load to prevent dysrhythmias    Hyponatremia  Assessment & Plan  Suspect hypervolemic hyponatremia, perhaps new onset heart failure    Recent Labs     03/23/24  1427 03/24/24  0550 03/25/24  0450   SODIUM 127* 127* 126*     Plan:  Trial diuresis with 20 mg IV Lasix daily  I's and O's  Limit sodium 2 g  Urine sodium, creatinine, osmolality pending     Atrial fibrillation with RVR ([facility])  Assessment & Plan  On Xarelto as outpatient  At home, takes 100 mg metoprolol twice daily, digoxin 125 mcg p.o. daily  RVR from [facility] is resolved  Hold off on digoxin  TSH wnl    Plan:  Continue metoprolol 50 mg twice daily (reduced from home dosing)  Holding Xarelto for procedure  As needed diltiazem 15 mg every 6 hours for heart rate greater than 110; currently sinus rhythm in the 60s  Maintain on telemetry  Cardiology consulted    Morbid obesity (HCC)  Assessment & Plan  Lifestyle counseling on discharge    Paroxysmal A-fib (HCC)  Assessment & Plan  See plan for A-fib with RVR  On Xarelto at home, on hold for procedure    HTN (hypertension)  Assessment & Plan  Patient home HTN meds held d/t hyponatremia.   Blood pressure persistently elevated  "with two episodes >180s/110s.   Holding home hydrochlorothiazide 25 mg p.o. daily since admission      Plan:  20 mg IV Lasix daily  Restart home amlodipine 5 mg qd today given persistently elevated BPs  Diltiazem 15 mg IV every 6 hours as needed for persistent heart rate greater than 110 (anticipate effect on blood pressure as well)  Restarted home Norvasc 5 mg daily    * Cardiac syncope  Assessment & Plan  89 yo F presenting w/ worsening cardiac syncope most likely due to sinus pauses and possible new onset heart failure  Patient appears euvolemic on admission and has demonstrated sinus pauses in [facility]    TTE obtained 03/25 was remarkable for severely increased left ventricular wall thickness and moderate to severe concentric hypertrophy. The left ventricular ejection fraction is 60%.     Plan:  Morning orthostatics  EP consult   NPO; in case patient is able to be added to schedule on 3/25 for permanent pacemaker        Disposition: Patient requires further inpatient monitoring for at least 48 hrs given possible placement of pacemaker and continuous telemetry monitoring.     SUBJECTIVE     Patient seen and examined. Daughter at the bedside.    Overnight, patient noted to have a desaturation of 72% on RA which promptly resolved with administration of oxygen 2L NC. However, patient stated she did not feel she was having a hard time breathing and has maintained O2 sats in the 90s on RA since the episode. No events were noted on telemetry over night.    Patient has no complaints this AM and is feeling well. She denies any headaches, chest pain, palpitations, dizziness, or SOB.     OBJECTIVE     Vitals:    03/25/24 0251 03/25/24 0716 03/25/24 0848 03/25/24 1118   BP: 154/77 (!) 184/93 (!) 184/93 163/72   Pulse: (!) 52 60 60 56   Resp: 17      Temp: (!) 97 °F (36.1 °C) 97.5 °F (36.4 °C)     TempSrc: Axillary Oral     SpO2: 98% 92%  92%   Weight:   113 kg (250 lb 3.6 oz)    Height:   5' 3\" (1.6 m)     "   Temperature:   Temp (24hrs), Av.5 °F (36.4 °C), Min:97 °F (36.1 °C), Max:97.9 °F (36.6 °C)    Temperature: 97.5 °F (36.4 °C)  Intake & Output:  I/O       None          Weights:   IBW (Ideal Body Weight): 52.4 kg    Body mass index is 44.32 kg/m².  Weight (last 2 days)       Date/Time Weight    24 0848 113 (250.22)    24 1428 113 (250)          Physical Exam  Vitals reviewed.   Constitutional:       Appearance: Normal appearance.   HENT:      Head: Normocephalic and atraumatic.      Right Ear: External ear normal.      Left Ear: External ear normal.      Nose: Nose normal.   Cardiovascular:      Rate and Rhythm: Normal rate and regular rhythm.      Pulses: Normal pulses.      Heart sounds: Normal heart sounds. No murmur heard.     No friction rub. No gallop.   Pulmonary:      Effort: No respiratory distress.      Breath sounds: Normal breath sounds. No wheezing.   Abdominal:      General: Bowel sounds are normal.      Palpations: Abdomen is soft.   Musculoskeletal:      Right lower leg: Edema (1+) present.      Left lower leg: Edema (1+) present.   Skin:     General: Skin is warm and dry.      Capillary Refill: Capillary refill takes less than 2 seconds.   Neurological:      Mental Status: She is alert and oriented to person, place, and time.   Psychiatric:         Mood and Affect: Mood normal.         Behavior: Behavior normal.         Thought Content: Thought content normal.         Judgment: Judgment normal.       LABORATORY DATA     Labs: I have personally reviewed pertinent reports.  Results from last 7 days   Lab Units 24  0450 24  0550 24  1427   WBC Thousand/uL 6.47 8.58 7.57   HEMOGLOBIN g/dL 12.6 13.7 13.5   HEMATOCRIT % 37.5 40.3 40.1   PLATELETS Thousands/uL 218 265 252   NEUTROS PCT % 60 67 75   MONOS PCT % 8 7 8   EOS PCT % 3 2 1      Results from last 7 days   Lab Units 24  0450 24  0550 24  1427   POTASSIUM mmol/L 3.7 3.6 3.5   CHLORIDE mmol/L 88*  "90* 92*   CO2 mmol/L 29 27 28   BUN mg/dL 13 10 10   CREATININE mg/dL 0.78 0.78 0.78   CALCIUM mg/dL 8.7 9.5 9.2   ALK PHOS U/L  --   --  78   ALT U/L  --   --  15   AST U/L  --   --  16     Results from last 7 days   Lab Units 03/25/24  0450 03/24/24  0550   MAGNESIUM mg/dL 2.1 1.7*                        IMAGING & DIAGNOSTIC TESTING     Radiology Results: I have personally reviewed pertinent reports.  No results found.  Other Diagnostic Testing: I have personally reviewed pertinent reports.    ACTIVE MEDICATIONS     Current Facility-Administered Medications   Medication Dose Route Frequency    amiodarone tablet 200 mg  200 mg Oral TID With Meals    amLODIPine (NORVASC) tablet 5 mg  5 mg Oral Daily    atorvastatin (LIPITOR) tablet 10 mg  10 mg Oral Daily With Dinner    diltiazem (CARDIZEM) injection 15 mg  15 mg Intravenous Q6H PRN    diphenhydrAMINE (BENADRYL) tablet 25 mg  25 mg Oral Q6H PRN    furosemide (LASIX) injection 20 mg  20 mg Intravenous Daily    heparin (porcine) subcutaneous injection 5,000 Units  5,000 Units Subcutaneous Q8H PEDRO    melatonin tablet 3 mg  3 mg Oral HS PRN    metoprolol tartrate (LOPRESSOR) tablet 50 mg  50 mg Oral BID       VTE Pharmacologic Prophylaxis: Heparin  VTE Mechanical Prophylaxis: sequential compression device    Portions of the record may have been created with voice recognition software.  Occasional wrong word or \"sound a like\" substitutions may have occurred due to the inherent limitations of voice recognition software.  Read the chart carefully and recognize, using context, where substitutions have occurred.  ==  Josue Ball MD  [facility]  Internal Medicine Residency PGY-1       "

## 2024-03-25 NOTE — UTILIZATION REVIEW
Initial Clinical Review    Admission: Date/Time/Statement:   Admission Orders (From admission, onward)       Ordered        03/23/24 1523  INPATIENT ADMISSION  Once                          Orders Placed This Encounter   Procedures    INPATIENT ADMISSION     Standing Status:   Standing     Number of Occurrences:   1     Order Specific Question:   Level of Care     Answer:   Med Surg [16]     Order Specific Question:   Bed request comments     Answer:   Telemetry     Order Specific Question:   Estimated length of stay     Answer:   More than 2 Midnights     Order Specific Question:   Certification     Answer:   I certify that inpatient services are medically necessary for this patient for a duration of greater than two midnights. See H&P and MD Progress Notes for additional information about the patient's course of treatment.     ED Arrival Information       Expected   -    Arrival   3/23/2024 14:07    Acuity   Urgent              Means of arrival   Ambulance    Escorted by   [facility]    Service   Hospitalist    Admission type   Emergency              Arrival complaint   Medical Problem             Chief Complaint   Patient presents with    Syncope     Pt came in via EMS from home. Pt reports a near syncope episode while sitting at the table. Pt reports felt really dizzy and hot, some SOB. Denies any other symptoms.        Initial Presentation: 89 y.o. female presents to the ED via EMS from home with c/o increasing lightheadedness and syncopal episode.  Son notes HR at 126 on Kardia device. Was started on Digoxin in last February.  PMH: A fib, HTN, HLD, preDM.  In the ED she was tachycardic and tachypnic.  Labs - elevated BNP, subtherapeutic Dig level, low NA.  Imaging - no acute disease.  ECG = A fib w/ RVR.  Treated with IV Lasix 20 mg x 1.  On exam irregular rhythm, oriented x 3.  Admitted to INPATIENT status with Syncope, A fib w/ RVR, LBBB, Hyponatremia, HTN = Echo, tele, orthostatics, resume  Digoxin, start Cardizem, Tele, cardio consult, TSH and T4, low suspicion ACS, trend BMP, Lasix, hold Norvasc/HCTZ, continue Metoprolol.      Date: 3/24   Day 2:   Seen by Cardio and pt was transferred to [facility] for PPM insertion.      3/24 Cardio Consult - Syncope, PAF w/ RVR, Hyponatremia, HTN, LBBB - transfer to [facility] for PPM insertion, hold Xarelto, d/c Digoxin, lower Lopressor to 50 mg BID, start Amiodarone    ED Triage Vitals [03/23/24 1412]   Temperature Pulse Respirations Blood Pressure SpO2   97.7 °F (36.5 °C) (!) 117 20 147/90 95 %      Temp Source Heart Rate Source Patient Position - Orthostatic VS BP Location FiO2 (%)   Oral Monitor Lying Left arm --      Pain Score       No Pain          Wt Readings from Last 1 Encounters:   03/24/24 113 kg (250 lb)     Additional Vital Signs:   Date/Time Temp Pulse Resp BP MAP (mmHg) SpO2 O2 Device Patient Position - Orthostatic VS   03/24/24 1048 -- 79 18 123/78 -- -- -- Sitting   03/24/24 0858 -- 77 -- 134/67 93 -- -- Standing - Orthostatic VS   03/24/24 0853 -- 74 -- 138/71 96 -- -- Sitting - Orthostatic VS   03/24/24 0849 98.4 °F (36.9 °C) 93 20 137/65 93 94 % None (Room air) Lying - Orthostatic VS   03/24/24 0722 98 °F (36.7 °C) 81 20 125/78 94 94 % None (Room air) Sitting   03/24/24 0322 98.1 °F (36.7 °C) 117 Abnormal  20 115/67 87 95 % None (Room air) Lying   03/23/24 2219 97.7 °F (36.5 °C) 118 Abnormal  20 142/65 94 93 % None (Room air) Lying   03/23/24 2100 -- -- -- -- -- -- None (Room air) --   03/23/24 1900 -- 118 Abnormal  -- 99/78 -- 96 % None (Room air) Lying   03/23/24 1744 98 °F (36.7 °C) 126 Abnormal  22 97/88 -- 95 % None (Room air) Lying   03/23/24 1641 -- 108 Abnormal  22 134/79 -- 95 % None (Room air) Standing - Orthostatic VS   03/23/24 1630 -- 86 22 173/147 Abnormal  157 95 % None (Room air) Sitting - Orthostatic VS   03/23/24 1627 -- 78 25 Abnormal  135/91 106 95 % None (Room air) Lying - Orthostatic VS   03/23/24 1615 -- 88 21 139/77 101 94 % None  (Room air) Sitting   03/23/24 1500 -- 104 23 Abnormal  -- -- 93 % None (Room air) --   03/23/24 1445 -- 92 23 Abnormal  -- -- 93 % None (Room air) --       Pertinent Labs/Diagnostic Test Results:     3/23  ECG - Atrial fibrillation with rapid ventricular response  Left axis deviation  Left bundle branch block  Abnormal ECG    3/23 ECG - Atrial fibrillation with premature ventricular or aberrantly conducted complexes  Left axis deviation  Left bundle branch block  Abnormal ECG    XR chest 1 view portable   ED Interpretation by Stormy Grissom DO (03/23 1502)   Xray reviewed and independently interpreted by me: mild vascular congestion      Final Result by Luis Luna MD (03/23 1757)      No acute cardiopulmonary disease.               Workstation performed: [id number]               Results from last 7 days   Lab Units 03/25/24  0450 03/24/24  0550 03/23/24  1427   WBC Thousand/uL 6.47 8.58 7.57   HEMOGLOBIN g/dL 12.6 13.7 13.5   HEMATOCRIT % 37.5 40.3 40.1   PLATELETS Thousands/uL 218 265 252   NEUTROS ABS Thousands/µL 3.93 5.76 5.64         Results from last 7 days   Lab Units 03/25/24  0450 03/24/24  0550 03/23/24  1427   SODIUM mmol/L 126* 127* 127*   POTASSIUM mmol/L 3.7 3.6 3.5   CHLORIDE mmol/L 88* 90* 92*   CO2 mmol/L 29 27 28   ANION GAP mmol/L 9 10 7   BUN mg/dL 13 10 10   CREATININE mg/dL 0.78 0.78 0.78   EGFR ml/min/1.73sq m 67 67 67   CALCIUM mg/dL 8.7 9.5 9.2   MAGNESIUM mg/dL 2.1 1.7*  --      Results from last 7 days   Lab Units 03/23/24  1427   AST U/L 16   ALT U/L 15   ALK PHOS U/L 78   TOTAL PROTEIN g/dL 6.4   ALBUMIN g/dL 3.8   TOTAL BILIRUBIN mg/dL 0.75         Results from last 7 days   Lab Units 03/25/24  0450 03/24/24  0550 03/23/24  1427   GLUCOSE RANDOM mg/dL 107 138 169*       Results from last 7 days   Lab Units 03/24/24  1658 03/23/24  1624 03/23/24  1427   HS TNI 0HR ng/L  --   --  14   HS TNI 2HR ng/L  --  15  --    HSTNI D2 ng/L  --  1  --    HS TNI 4HR ng/L 19  --   --               Results from last 7 days   Lab Units 03/24/24  1658   TSH 3RD GENERATON uIU/mL 1.982     Results from last 7 days   Lab Units 03/23/24  1427   DIGOXIN LVL ng/mL 0.6*     Results from last 7 days   Lab Units 03/23/24  1427   BNP pg/mL 457*     ED Treatment:   Medication Administration from 03/23/2024 1407 to 03/23/2024 1711         Date/Time Order Dose Route Action     03/23/2024 1511 EDT furosemide (LASIX) injection 20 mg 20 mg Intravenous Given          Past Medical History:   Diagnosis Date    Afib (HCC)     Colon polyp 4/19/2018    Formatting of this note might be different from the original. Colonoscopy 4/19/18    Hyperlipidemia     Hypertension     Pre-diabetes 10/10/2022    A1c about 6.2 since 2018    Stroke-like symptoms: vertigo and gait imbalance 9/22/2022    Urge incontinence of urine 5/6/2016     Present on Admission:   Cardiac syncope   HTN (hypertension)      Admitting Diagnosis: Atrial fibrillation (HCC) [I48.91]  Hyponatremia [E87.1]  Syncope [R55]  Pulmonary vascular congestion [R09.89]  Age/Sex: 89 y.o. female  Admission Orders:  Scheduled Medications:    Amiodarone  Dig 125 mcg  IV Lasix 20 mg daily  Lopressor  Xarelto    Continuous IV Infusions:  No current facility-administered medications for this encounter.    PRN Meds:  Benadryl x 1 3/23  Melatonin x 1 3/23    Tele  Cardio Consult   Echo    Network Utilization Review Department  ATTENTION: Please call with any questions or concerns to [phone] and carefully listen to the prompts so that you are directed to the right person. All voicemails are confidential.   For Discharge needs, contact Care Management DC Support Team at [phone] opt. 2  Send all requests for admission clinical reviews, approved or denied determinations and any other requests to dedicated fax number below belonging to the campus where the patient is receiving treatment. List of dedicated fax numbers for the Facilities:  FACILITY NAME UR FAX NUMBER   ADMISSION DENIALS  (Administrative/Medical Necessity) [phone]   DISCHARGE SUPPORT TEAM (NETWORK) [phone]   PARENT CHILD HEALTH (Maternity/NICU/Pediatrics) [phone]   [facility] [phone]   [facility] [phone]   Critical access hospital [phone]   [facility] [phone]   [facility] [phone]   [facility] [phone]   [facility] [phone]   [facility] [phone]   [facility] [phone]   [facility] [phone]   [facility] [phone]   [facility] [phone]

## 2024-03-25 NOTE — UTILIZATION REVIEW
NOTIFICATION OF ADMISSION DISCHARGE   This is a Notification of Discharge from [facility]. Please be advised that this patient has been discharge from our facility. Below you will find the admission and discharge date and time including the patient’s disposition.   UTILIZATION REVIEW CONTACT:  Rama Chandler  Utilization   Network Utilization Review Department  Phone: [phone] x carefully listen to the prompts. All voicemails are confidential.  Email: [email or web address]     ADMISSION INFORMATION  PRESENTATION DATE: 3/23/2024  2:07 PM  OBERVATION ADMISSION DATE:   INPATIENT ADMISSION DATE: 3/23/24  3:23 PM   DISCHARGE DATE: 3/24/2024  1:45 PM   DISPOSITION:[facility] Utilization Review Department  ATTENTION: Please call with any questions or concerns to [phone] and carefully listen to the prompts so that you are directed to the right person. All voicemails are confidential.   For Discharge needs, contact Care Management DC Support Team at [phone] opt. 2  Send all requests for admission clinical reviews, approved or denied determinations and any other requests to dedicated fax number below belonging to the campus where the patient is receiving treatment. List of dedicated fax numbers for the Facilities:  FACILITY NAME UR FAX NUMBER   ADMISSION DENIALS (Administrative/Medical Necessity) [phone]   DISCHARGE SUPPORT TEAM ([facility]) [phone]   PARENT CHILD HEALTH (Maternity/NICU/Pediatrics) [phone]   [facility] [phone]   [facility] [phone]   [facility] [phone]   [facility] [phone]   Critical access hospital [phone]   [facility] [phone]   [facility] [phone]   [facility] [phone]    [facility] [phone]   [facility] [phone]   [facility] [phone]   [facility] [phone]

## 2024-03-26 ENCOUNTER — ANESTHESIA (INPATIENT)
Dept: NON INVASIVE DIAGNOSTICS | Facility: HOSPITAL | Age: 89
DRG: 242 | End: 2024-03-26
Payer: COMMERCIAL

## 2024-03-26 ENCOUNTER — APPOINTMENT (INPATIENT)
Dept: RADIOLOGY | Facility: HOSPITAL | Age: 89
DRG: 242 | End: 2024-03-26
Payer: COMMERCIAL

## 2024-03-26 LAB
ANION GAP SERPL CALCULATED.3IONS-SCNC: 11 MMOL/L (ref 4–13)
BASOPHILS # BLD AUTO: 0.01 THOUSANDS/ÂΜL (ref 0–0.1)
BASOPHILS NFR BLD AUTO: 0 % (ref 0–1)
BUN SERPL-MCNC: 12 MG/DL (ref 5–25)
CALCIUM SERPL-MCNC: 8.9 MG/DL (ref 8.4–10.2)
CHLORIDE SERPL-SCNC: 91 MMOL/L (ref 96–108)
CO2 SERPL-SCNC: 28 MMOL/L (ref 21–32)
CREAT SERPL-MCNC: 0.67 MG/DL (ref 0.6–1.3)
EOSINOPHIL # BLD AUTO: 0.09 THOUSAND/ÂΜL (ref 0–0.61)
EOSINOPHIL NFR BLD AUTO: 1 % (ref 0–6)
ERYTHROCYTE [DISTWIDTH] IN BLOOD BY AUTOMATED COUNT: 12.7 % (ref 11.6–15.1)
GFR SERPL CREATININE-BSD FRML MDRD: 77 ML/MIN/1.73SQ M
GLUCOSE SERPL-MCNC: 123 MG/DL (ref 65–140)
GLUCOSE SERPL-MCNC: 137 MG/DL (ref 65–140)
HCT VFR BLD AUTO: 39.1 % (ref 34.8–46.1)
HGB BLD-MCNC: 13.1 G/DL (ref 11.5–15.4)
IMM GRANULOCYTES # BLD AUTO: 0.04 THOUSAND/UL (ref 0–0.2)
IMM GRANULOCYTES NFR BLD AUTO: 1 % (ref 0–2)
LYMPHOCYTES # BLD AUTO: 1.23 THOUSANDS/ÂΜL (ref 0.6–4.47)
LYMPHOCYTES NFR BLD AUTO: 17 % (ref 14–44)
MCH RBC QN AUTO: 29.2 PG (ref 26.8–34.3)
MCHC RBC AUTO-ENTMCNC: 33.5 G/DL (ref 31.4–37.4)
MCV RBC AUTO: 87 FL (ref 82–98)
MONOCYTES # BLD AUTO: 0.57 THOUSAND/ÂΜL (ref 0.17–1.22)
MONOCYTES NFR BLD AUTO: 8 % (ref 4–12)
NEUTROPHILS # BLD AUTO: 5.29 THOUSANDS/ÂΜL (ref 1.85–7.62)
NEUTS SEG NFR BLD AUTO: 73 % (ref 43–75)
NRBC BLD AUTO-RTO: 0 /100 WBCS
PLATELET # BLD AUTO: 219 THOUSANDS/UL (ref 149–390)
PMV BLD AUTO: 9.3 FL (ref 8.9–12.7)
POTASSIUM SERPL-SCNC: 4 MMOL/L (ref 3.5–5.3)
RBC # BLD AUTO: 4.49 MILLION/UL (ref 3.81–5.12)
SODIUM SERPL-SCNC: 130 MMOL/L (ref 135–147)
WBC # BLD AUTO: 7.23 THOUSAND/UL (ref 4.31–10.16)

## 2024-03-26 PROCEDURE — 02HK0JZ INSERTION OF PACEMAKER LEAD INTO RIGHT VENTRICLE, OPEN APPROACH: ICD-10-PCS | Performed by: INTERNAL MEDICINE

## 2024-03-26 PROCEDURE — C1892 INTRO/SHEATH,FIXED,PEEL-AWAY: HCPCS | Performed by: INTERNAL MEDICINE

## 2024-03-26 PROCEDURE — C1887 CATHETER, GUIDING: HCPCS | Performed by: INTERNAL MEDICINE

## 2024-03-26 PROCEDURE — 85025 COMPLETE CBC W/AUTO DIFF WBC: CPT

## 2024-03-26 PROCEDURE — 93005 ELECTROCARDIOGRAM TRACING: CPT

## 2024-03-26 PROCEDURE — C1786 PMKR, SINGLE, RATE-RESP: HCPCS | Performed by: INTERNAL MEDICINE

## 2024-03-26 PROCEDURE — 0JH604Z INSERTION OF PACEMAKER, SINGLE CHAMBER INTO CHEST SUBCUTANEOUS TISSUE AND FASCIA, OPEN APPROACH: ICD-10-PCS | Performed by: INTERNAL MEDICINE

## 2024-03-26 PROCEDURE — 80048 BASIC METABOLIC PNL TOTAL CA: CPT

## 2024-03-26 PROCEDURE — 33207 INSERT HEART PM VENTRICULAR: CPT | Performed by: INTERNAL MEDICINE

## 2024-03-26 PROCEDURE — 82948 REAGENT STRIP/BLOOD GLUCOSE: CPT

## 2024-03-26 PROCEDURE — C1769 GUIDE WIRE: HCPCS | Performed by: INTERNAL MEDICINE

## 2024-03-26 PROCEDURE — 71045 X-RAY EXAM CHEST 1 VIEW: CPT

## 2024-03-26 PROCEDURE — C1898 LEAD, PMKR, OTHER THAN TRANS: HCPCS | Performed by: INTERNAL MEDICINE

## 2024-03-26 PROCEDURE — 99232 SBSQ HOSP IP/OBS MODERATE 35: CPT | Performed by: INTERNAL MEDICINE

## 2024-03-26 DEVICE — IPG W1SR01 AZURE XT SR MRI USA
Type: IMPLANTABLE DEVICE | Site: CHEST  WALL | Status: FUNCTIONAL
Brand: AZURE™ XT SR MRI SURESCAN™

## 2024-03-26 DEVICE — LEAD 3830 US MKT/ 69CM MRI LBBAP
Type: IMPLANTABLE DEVICE | Site: HEART | Status: FUNCTIONAL
Brand: SELECTSECURE™ MRI SURESCAN™

## 2024-03-26 DEVICE — ENVELOPE CMRM6122 ABSORB MED MR
Type: IMPLANTABLE DEVICE | Site: CHEST  WALL | Status: FUNCTIONAL
Brand: TYRX™

## 2024-03-26 RX ORDER — LIDOCAINE HYDROCHLORIDE 10 MG/ML
INJECTION, SOLUTION EPIDURAL; INFILTRATION; INTRACAUDAL; PERINEURAL AS NEEDED
Status: DISCONTINUED | OUTPATIENT
Start: 2024-03-26 | End: 2024-03-26

## 2024-03-26 RX ORDER — SODIUM CHLORIDE 9 MG/ML
INJECTION, SOLUTION INTRAVENOUS CONTINUOUS PRN
Status: DISCONTINUED | OUTPATIENT
Start: 2024-03-26 | End: 2024-03-26

## 2024-03-26 RX ORDER — PROPOFOL 10 MG/ML
INJECTION, EMULSION INTRAVENOUS CONTINUOUS PRN
Status: DISCONTINUED | OUTPATIENT
Start: 2024-03-26 | End: 2024-03-26

## 2024-03-26 RX ORDER — FENTANYL CITRATE 50 UG/ML
INJECTION, SOLUTION INTRAMUSCULAR; INTRAVENOUS AS NEEDED
Status: DISCONTINUED | OUTPATIENT
Start: 2024-03-26 | End: 2024-03-26

## 2024-03-26 RX ORDER — GENTAMICIN SULFATE 40 MG/ML
INJECTION, SOLUTION INTRAMUSCULAR; INTRAVENOUS CODE/TRAUMA/SEDATION MEDICATION
Status: DISCONTINUED | OUTPATIENT
Start: 2024-03-26 | End: 2024-03-26 | Stop reason: HOSPADM

## 2024-03-26 RX ORDER — LIDOCAINE HYDROCHLORIDE 10 MG/ML
INJECTION, SOLUTION EPIDURAL; INFILTRATION; INTRACAUDAL; PERINEURAL CODE/TRAUMA/SEDATION MEDICATION
Status: DISCONTINUED | OUTPATIENT
Start: 2024-03-26 | End: 2024-03-26 | Stop reason: HOSPADM

## 2024-03-26 RX ADMIN — FENTANYL CITRATE 25 MCG: 50 INJECTION INTRAMUSCULAR; INTRAVENOUS at 14:01

## 2024-03-26 RX ADMIN — METOPROLOL TARTRATE 50 MG: 50 TABLET, FILM COATED ORAL at 17:33

## 2024-03-26 RX ADMIN — FUROSEMIDE 20 MG: 10 INJECTION, SOLUTION INTRAMUSCULAR; INTRAVENOUS at 08:23

## 2024-03-26 RX ADMIN — METOPROLOL TARTRATE 50 MG: 50 TABLET, FILM COATED ORAL at 08:23

## 2024-03-26 RX ADMIN — LIDOCAINE HYDROCHLORIDE 50 MG: 10 INJECTION, SOLUTION EPIDURAL; INFILTRATION; INTRACAUDAL; PERINEURAL at 13:54

## 2024-03-26 RX ADMIN — RIVAROXABAN 20 MG: 20 TABLET, FILM COATED ORAL at 17:34

## 2024-03-26 RX ADMIN — PROPOFOL 60 MCG/KG/MIN: 10 INJECTION, EMULSION INTRAVENOUS at 13:54

## 2024-03-26 RX ADMIN — FENTANYL CITRATE 25 MCG: 50 INJECTION INTRAMUSCULAR; INTRAVENOUS at 13:54

## 2024-03-26 RX ADMIN — PHENYLEPHRINE HYDROCHLORIDE 40 MCG/MIN: 10 INJECTION INTRAVENOUS at 14:07

## 2024-03-26 RX ADMIN — ATORVASTATIN CALCIUM 10 MG: 10 TABLET, FILM COATED ORAL at 17:35

## 2024-03-26 RX ADMIN — SODIUM CHLORIDE: 0.9 INJECTION, SOLUTION INTRAVENOUS at 13:39

## 2024-03-26 RX ADMIN — AMLODIPINE BESYLATE 5 MG: 5 TABLET ORAL at 08:23

## 2024-03-26 RX ADMIN — CEFAZOLIN SODIUM 2000 MG: 2 SOLUTION INTRAVENOUS at 13:48

## 2024-03-26 NOTE — PROGRESS NOTES
INTERNAL MEDICINE RESIDENCY PROGRESS NOTE     Name: Diana Godoy   Age & Sex: 89 y.o. female   MRN: [id number]  Unit/Bed#: [facility] 431-01   Encounter: [id number]  Team: SOD Team C     PATIENT INFORMATION     Name: Diana Godoy   Age & Sex: 89 y.o. female   MRN: [id number]  Hospital Stay Days: 2    ASSESSMENT/PLAN     Principal Problem:    Cardiac syncope  Active Problems:    HTN (hypertension)    Paroxysmal A-fib (HCC)    Morbid obesity (HCC)    Urge incontinence of urine    Pre-diabetes    Atrial fibrillation with RVR (formerly [facility])    Hyponatremia    LBBB (left bundle branch block)      LBBB (left bundle branch block)  Assessment & Plan  New LBBB demonstrated on EKG as of 03/23  Likely contributing to patient's sinus pauses      Plan:  Amiodarone 200 mg TID as part of amiodarone load to prevent dysrhythmias    Hyponatremia  Assessment & Plan  On admission, suspected hypervolemic hyponatremia, perhaps new onset heart failure, now unlikely given patient appears euvolemic and is persistently hyponatremic following diuresis.   Given serum osm < urine osm in the setting of persistent hyponatremia despite diuresis, likely secondary to SIADH    Lab Results   Component Value Date/Time    OSMOUA 310 03/25/2024 11:32 AM    NAUR 34 03/25/2024 11:32 AM    OSMOLALITSER 274 (L) 03/25/2024 11:27 AM        Recent Labs     03/25/24  0450 03/25/24  1127 03/26/24  0529   SODIUM 126* 128* 130*     Plan:  Discontinue 20 mg IV Lasix QD starting tomorrow   I's and O's  Limit sodium 2 g  Begin 1.5 L fluid restriction daily for suspected SIADH   F/u with PCP outpatient and consider repeat BMP one week following d/c    Atrial fibrillation with RVR (formerly [facility])  Assessment & Plan  On Xarelto as outpatient  At home, takes 100 mg metoprolol twice daily, digoxin 125 mcg p.o. daily  RVR from [place] ED is resolved  Hold off on digoxin  TSH wnl    Plan:  Continue metoprolol 50 mg twice daily (reduced from home dosing)  Holding Xarelto for  procedure  As needed diltiazem 15 mg every 6 hours for heart rate greater than 110; currently sinus rhythm in the 60s  Maintain on telemetry  Cardiology consulted    Morbid obesity (HCC)  Assessment & Plan  Lifestyle counseling on discharge    Paroxysmal A-fib (HCC)  Assessment & Plan  See plan for A-fib with RVR  On Xarelto at home, on hold for procedure    HTN (hypertension)  Assessment & Plan  Patient home HTN meds held d/t hyponatremia.   Blood pressure persistently elevated with two episodes >180s/110s, home amlodipine was restarted on  d/t persistently high BPs. .   Holding home hydrochlorothiazide 25 mg p.o. daily since admission    Systolic (24hrs), Av , Min:131 , Max:164   Diastolic (24hrs), Av, Min:62, Max:87      Plan:  Discontinue 20 mg IV Lasix daily   Amlodipine 5 mg qd   Diltiazem 15 mg IV every 6 hours as needed for persistent heart rate greater than 110 (anticipate effect on blood pressure as well)    * Cardiac syncope  Assessment & Plan  89 yo F presenting w/ worsening cardiac syncope most likely due to sinus pauses and possible new onset heart failure  Patient appears euvolemic on admission and has demonstrated sinus pauses in [place] ED    TTE obtained  was remarkable for severely increased left ventricular wall thickness and moderate to severe concentric hypertrophy. The left ventricular ejection fraction is 60%.     Plan:  Morning orthostatics  EP consult -   Recommending permanent pacemaker placement   NPO since midnight for permanent pacemaker placement today        Disposition: Patient requires at least 24 hrs of further inpatient monitoring given pacemaker placement today.      SUBJECTIVE     Patient seen and examined with her daughter accompanying her at the bedside. No acute events overnight. Patient reports she had some constipation last night that was alleviated with suppository treatment.     She denies any dizziness, N/V/D, chest pain, palpitations, or SOB.      OBJECTIVE     Vitals:    24 0714 24 0800 24 0823 24 0826   BP: 154/79  155/85 155/85   Pulse: 68  73 72   Resp:       Temp: (!) 97.4 °F (36.3 °C)      TempSrc:       SpO2: 92% 91%  93%   Weight:       Height:          Temperature:   Temp (24hrs), Av.7 °F (36.5 °C), Min:97.4 °F (36.3 °C), Max:98 °F (36.7 °C)    Temperature: (!) 97.4 °F (36.3 °C)  Intake & Output:  I/O          0701   0700  0701   0700  0701   0700    P.O.  238     Total Intake(mL/kg)  238 (2.1)     Urine (mL/kg/hr)  502 (0.2)     Total Output  502     Net  -264            Unmeasured Urine Occurrence  3 x           Weights:   IBW (Ideal Body Weight): 52.4 kg    Body mass index is 44.32 kg/m².  Weight (last 2 days)       Date/Time Weight    24 0848 113 (250.22)    24 1428 113 (250)          Physical Exam  Vitals reviewed.   Constitutional:       Appearance: Normal appearance.   HENT:      Head: Normocephalic and atraumatic.      Right Ear: External ear normal.      Left Ear: External ear normal.      Nose: Nose normal.   Cardiovascular:      Rate and Rhythm: Normal rate and regular rhythm.      Heart sounds: Normal heart sounds. No murmur heard.     No friction rub. No gallop.   Pulmonary:      Effort: Pulmonary effort is normal. No respiratory distress.      Breath sounds: Normal breath sounds. No wheezing or rhonchi.   Abdominal:      General: Bowel sounds are normal.   Musculoskeletal:      Right lower leg: Edema (trace) present.      Left lower leg: Edema (trace) present.   Skin:     General: Skin is warm and dry.      Capillary Refill: Capillary refill takes less than 2 seconds.   Neurological:      Mental Status: She is alert and oriented to person, place, and time. Mental status is at baseline.   Psychiatric:         Mood and Affect: Mood normal.         Behavior: Behavior normal.         Thought Content: Thought content normal.         Judgment: Judgment normal.        LABORATORY DATA     Labs: I have personally reviewed pertinent reports.  Results from last 7 days   Lab Units 03/26/24  0529 03/25/24  0450 03/24/24  0550   WBC Thousand/uL 7.23 6.47 8.58   HEMOGLOBIN g/dL 13.1 12.6 13.7   HEMATOCRIT % 39.1 37.5 40.3   PLATELETS Thousands/uL 219 218 265   NEUTROS PCT % 73 60 67   MONOS PCT % 8 8 7   EOS PCT % 1 3 2      Results from last 7 days   Lab Units 03/26/24  0529 03/25/24  1127 03/25/24  0450 03/24/24  0550 03/23/24  1427   POTASSIUM mmol/L 4.0 3.3* 3.7   < > 3.5   CHLORIDE mmol/L 91* 90* 88*   < > 92*   CO2 mmol/L 28 29 29   < > 28   BUN mg/dL 12 12 13   < > 10   CREATININE mg/dL 0.67 0.71 0.78   < > 0.78   CALCIUM mg/dL 8.9 8.9 8.7   < > 9.2   ALK PHOS U/L  --   --   --   --  78   ALT U/L  --   --   --   --  15   AST U/L  --   --   --   --  16    < > = values in this interval not displayed.     Results from last 7 days   Lab Units 03/25/24  0450 03/24/24  0550   MAGNESIUM mg/dL 2.1 1.7*                        IMAGING & DIAGNOSTIC TESTING     Radiology Results: I have personally reviewed pertinent reports.  No results found.  Other Diagnostic Testing: I have personally reviewed pertinent reports.    ACTIVE MEDICATIONS     Current Facility-Administered Medications   Medication Dose Route Frequency    amLODIPine (NORVASC) tablet 5 mg  5 mg Oral Daily    atorvastatin (LIPITOR) tablet 10 mg  10 mg Oral Daily With Dinner    bisacodyl (DULCOLAX) rectal suppository 10 mg  10 mg Rectal Daily PRN    ceFAZolin (ANCEF) IVPB (premix in dextrose) 2,000 mg 50 mL  2,000 mg Intravenous Once    diphenhydrAMINE (BENADRYL) tablet 25 mg  25 mg Oral Q6H PRN    furosemide (LASIX) injection 20 mg  20 mg Intravenous Daily    melatonin tablet 3 mg  3 mg Oral HS PRN    metoprolol tartrate (LOPRESSOR) tablet 50 mg  50 mg Oral BID    polyethylene glycol (MIRALAX) packet 17 g  17 g Oral Daily PRN    senna (SENOKOT) tablet 8.6 mg  1 tablet Oral HS       VTE Pharmacologic Prophylaxis: Home Xarelto  "currently on hold for procedure  VTE Mechanical Prophylaxis: sequential compression device    Portions of the record may have been created with voice recognition software.  Occasional wrong word or \"sound a like\" substitutions may have occurred due to the inherent limitations of voice recognition software.  Read the chart carefully and recognize, using context, where substitutions have occurred.  ==  Jasmeet Ball MD  [facility]  Internal Medicine Residency PGY-1      "

## 2024-03-26 NOTE — UTILIZATION REVIEW
Initial Clinical Review    The patient was transferred to [facility] ([place]) on 3/24 from [facility], where care began on 3/23. 1 midnight has already been surpassed with active ongoing care.       Admission: Date/Time/Statement:   Admission Orders (From admission, onward)       Ordered        03/24/24 1454  Inpatient Admission  Once                          Orders Placed This Encounter   Procedures    Inpatient Admission     Standing Status:   Standing     Number of Occurrences:   1     Order Specific Question:   Level of Care     Answer:   Med Surg [16]     Comments:   tele     Order Specific Question:   Estimated length of stay     Answer:   More than 2 Midnights     Order Specific Question:   Certification     Answer:   I certify that inpatient services are medically necessary for this patient for a duration of greater than two midnights. See H&P and MD Progress Notes for additional information about the patient's course of treatment.     ED Arrival Information       Patient not seen in ED                       No chief complaint on file.      Initial Presentation: 89 y.o. female  history of hypertension, PAF on Xarelto, hyponatremia, tachybradycardia syndrome, who presents with syncope.  Patient states that she has had 3 syncopal episodes in the past month at home. The first 2 occurred while sitting at the kitchen counter, and the third occurred while in front of the refrigerator. Patient's family (children) present at the bedside to provide more history. They state that the patient has been lightheaded and short of breath while at rest intermittently for the past 3 to 4 weeks. The syncopal episodes seem to have happened more frequently in the last few weeks leading up to admission. While in the ED at [place], patient was in and out of A-fib but was not repeatedly in RVR. Patient was also noted to have a few sinus pauses on telemetry, new from prior cardiology history.  Cardiology saw and evaluated patient at [place]. They recommended to discontinue patient's digoxin and lower Lopressor to 50 mg twice daily from 100 mg twice daily. They recommended holding hydrochlorothiazide and initiating amiodarone to maintain sinus rhythm and prevent recurrent conversion pauses with a p.o. amiodarone load. Initial troponins were 10, then 15. Patient was then transferred to [facility]. Plan: Inpatient admission for evaluation and treatment of cardiac syncope, Afib with RVR, HTN, LBBB, hyponatremia, morbid obesity: orthostatic vitals, Echo, telemetry, Cardiology consult, resume digoxin, continue Xarelto, add diltiazem, telemetry, TSH with reflex T4, resume lasix, BMP in am, hold home Norvasc/HCTZ, continue metoprolol.       Day 3: Has surpassed a 2nd midnight with active treatments and services. Will start Lasix for diuresis. Trend BMP. Will initiate hyponatremia workup. EP consulted, recommendations greatly appreciated. Continue to monitor on telemetry. Continue current medication regimen. Scheduled for permanent pacemaker implantation tomorrow, hold Xarelto for planned procedure.       ED Triage Vitals   Temperature Pulse Respirations Blood Pressure SpO2   03/24/24 1424 03/24/24 1422 03/24/24 1428 03/24/24 1422 03/24/24 1422   97.5 °F (36.4 °C) 57 18 148/74 96 %      Temp Source Heart Rate Source Patient Position - Orthostatic VS BP Location FiO2 (%)   03/24/24 1424 -- 03/24/24 1816 03/24/24 1816 --   Oral  Lying - Orthostatic VS Right arm       Pain Score       03/24/24 1429       No Pain          Wt Readings from Last 1 Encounters:   03/25/24 113 kg (250 lb 3.6 oz)     Additional Vital Signs:     Date/Time Temp Pulse Resp BP MAP (mmHg) SpO2 Calculated FIO2 (%) - Nasal Cannula Nasal Cannula O2 Flow Rate (L/min) O2 Device Patient Position - Orthostatic VS   03/26/24 08:26:24 -- 72 -- 155/85 108 93 % -- -- -- --   03/26/24 0823 -- 73 -- 155/85 -- -- -- -- -- --   03/26/24 0800 -- -- --  -- -- 91 % -- -- None (Room air) --   03/26/24 07:14:48 97.4 °F (36.3 °C) Abnormal  68 -- 154/79 104 92 % -- -- -- --   03/26/24 02:48:26 -- 65 -- 153/77 102 93 % -- -- -- --   03/25/24 2300 -- -- -- -- -- 93 % -- -- -- --   03/25/24 22:19:53 98 °F (36.7 °C) 59 -- 164/87 113 94 % -- -- -- --   03/25/24 19:25:24 97.7 °F (36.5 °C) 62 16 156/73 101 93 % -- -- None (Room air) Lying   03/25/24 17:15:26 -- 69 -- 158/72 101 92 % -- -- -- --   03/25/24 15:02:44 -- 66 -- 131/62 85 93 % -- -- -- --   03/25/24 11:18:35 -- 56 -- 163/72 102 92 % -- -- -- --   03/25/24 0848 -- 60 -- 184/93 Abnormal  -- -- -- -- -- --   03/25/24 0800 -- -- -- -- -- 91 % -- -- None (Room air) --   03/25/24 07:16:54 97.5 °F (36.4 °C) 60 -- 184/93 Abnormal  123 92 % -- -- -- --   03/25/24 02:51:09 97 °F (36.1 °C) Abnormal  52 Abnormal  17 154/77 103 98 % -- -- -- --   03/24/24 2350 -- -- -- -- -- 74 % Abnormal  28 2 L/min Nasal cannula --   03/24/24 22:35:32 97.5 °F (36.4 °C) 58 16 156/77 103 92 % -- -- -- --   03/24/24 20:07:39 97.9 °F (36.6 °C) 57 17 155/79 104 91 % -- -- -- --   03/24/24 1818 -- 67 -- 162/86 -- -- -- -- -- Standing - Orthostatic VS   03/24/24 1817 -- 63 -- 158/88 -- -- -- -- -- Sitting - Orthostatic VS   03/24/24 1816 -- 61 -- 149/71 -- -- -- -- -- Lying - Orthostatic VS   03/24/24 1428 97.5 °F (36.4 °C) 57 18 148/74 -- -- -- -- -- --   03/24/24 1424 97.5 °F (36.4 °C) -- -- -- -- -- -- -- -- --     Pertinent Labs/Diagnostic Test Results:     3/25 Echo:    Interpretation Summary    Left Ventricle: Left ventricular cavity size is normal. Wall thickness is severely increased. There is moderate to severe concentric hypertrophy. The left ventricular ejection fraction is 60%. Systolic function is normal. Wall motion is normal. Diastolic function is mildly abnormal, consistent with grade I (abnormal) relaxation.    IVS: There is abnormal septal motion consistent with left bundle branch block.    Right Ventricle: Right ventricular cavity  size is normal. Systolic function is normal.    Left Atrium: The atrium is mildly dilated.    Aortic Valve: The aortic valve is trileaflet. The leaflets are moderately thickened. The leaflets are mildly calcified. The leaflets exhibit normal mobility. There is aortic valve sclerosis.    Mitral Valve: There is moderate annular calcification.    Tricuspid Valve: The right ventricular systolic pressure is mildly elevated. The estimated right ventricular systolic pressure is 34 mmHg.      Results from last 7 days   Lab Units 03/26/24  0529 03/25/24  0450 03/24/24  0550 03/23/24  1427   WBC Thousand/uL 7.23 6.47 8.58 7.57   HEMOGLOBIN g/dL 13.1 12.6 13.7 13.5   HEMATOCRIT % 39.1 37.5 40.3 40.1   PLATELETS Thousands/uL 219 218 265 252   NEUTROS ABS Thousands/µL 5.29 3.93 5.76 5.64         Results from last 7 days   Lab Units 03/26/24  0529 03/25/24  1127 03/25/24  0450 03/24/24  0550 03/23/24  1427   SODIUM mmol/L 130* 128* 126* 127* 127*   POTASSIUM mmol/L 4.0 3.3* 3.7 3.6 3.5   CHLORIDE mmol/L 91* 90* 88* 90* 92*   CO2 mmol/L 28 29 29 27 28   ANION GAP mmol/L 11 9 9 10 7   BUN mg/dL 12 12 13 10 10   CREATININE mg/dL 0.67 0.71 0.78 0.78 0.78   EGFR ml/min/1.73sq m 77 75 67 67 67   CALCIUM mg/dL 8.9 8.9 8.7 9.5 9.2   MAGNESIUM mg/dL  --   --  2.1 1.7*  --      Results from last 7 days   Lab Units 03/23/24  1427   AST U/L 16   ALT U/L 15   ALK PHOS U/L 78   TOTAL PROTEIN g/dL 6.4   ALBUMIN g/dL 3.8   TOTAL BILIRUBIN mg/dL 0.75     Results from last 7 days   Lab Units 03/26/24  0623 03/25/24  2048   POC GLUCOSE mg/dl 137 169*     Results from last 7 days   Lab Units 03/26/24  0529 03/25/24  1127 03/25/24  0450 03/24/24  0550 03/23/24  1427   GLUCOSE RANDOM mg/dL 123 123 107 138 169*     Results from last 7 days   Lab Units 03/25/24  1127   OSMOLALITY, SERUM mmol/*         Results from last 7 days   Lab Units 03/24/24  1658 03/23/24  1624 03/23/24  1427   HS TNI 0HR ng/L  --   --  14   HS TNI 2HR ng/L  --  15  --     HSTNI D2 ng/L  --  1  --    HS TNI 4HR ng/L 19  --   --              Results from last 7 days   Lab Units 03/24/24  1658   TSH 3RD GENERATON uIU/mL 1.982           Results from last 7 days   Lab Units 03/23/24  1427   DIGOXIN LVL ng/mL 0.6*     Results from last 7 days   Lab Units 03/23/24  1427   BNP pg/mL 457*           Results from last 7 days   Lab Units 03/25/24  1132 03/25/24  1127   OSMOLALITY, SERUM mmol/KG  --  274*   OSMO UR mmol/  --      Results from last 7 days   Lab Units 03/25/24  1132   SODIUM UR  34   CREATININE UR mg/dL 66.6         ED Treatment:   Medication Administration - No Administrations Displayed (No Start Event Found)       None          Past Medical History:   Diagnosis Date    Afib (HCC)     Colon polyp 4/19/2018    Formatting of this note might be different from the original. Colonoscopy 4/19/18    Hyperlipidemia     Hypertension     Pre-diabetes 10/10/2022    A1c about 6.2 since 2018    Stroke-like symptoms: vertigo and gait imbalance 9/22/2022    Urge incontinence of urine 5/6/2016     Present on Admission:   Atrial fibrillation with RVR (HCC)   HTN (hypertension)   Hyponatremia   LBBB (left bundle branch block)   Morbid obesity (HCC)   Paroxysmal A-fib (HCC)   Cardiac syncope   Urge incontinence of urine   Pre-diabetes      Admitting Diagnosis: Syncope [R55]  Age/Sex: 89 y.o. female  Admission Orders:  Scheduled Medications:  amLODIPine, 5 mg, Oral, Daily  atorvastatin, 10 mg, Oral, Daily With Dinner  cefazolin, 2,000 mg, Intravenous, Once  furosemide, 20 mg, Intravenous, Daily  metoprolol tartrate, 50 mg, Oral, BID  senna, 1 tablet, Oral, HS      Continuous IV Infusions:     PRN Meds:  bisacodyl, 10 mg, Rectal, Daily PRN  diphenhydrAMINE, 25 mg, Oral, Q6H PRN  melatonin, 3 mg, Oral, HS PRN  polyethylene glycol, 17 g, Oral, Daily PRN        IP CONSULT TO CARDIOLOGY  IP CONSULT TO CASE MANAGEMENT  IP CONSULT TO ELECTROPHYSIOLOGY    Network Utilization Review  Department  ATTENTION: Please call with any questions or concerns to [phone] and carefully listen to the prompts so that you are directed to the right person. All voicemails are confidential.   For Discharge needs, contact Care Management DC Support Team at [phone] opt. [phone]  Send all requests for admission clinical reviews, approved or denied determinations and any other requests to dedicated fax number below belonging to the campus where the patient is receiving treatment. List of dedicated fax numbers for the Facilities:  FACILITY NAME UR FAX NUMBER   ADMISSION DENIALS (Administrative/Medical Necessity) [phone]   DISCHARGE SUPPORT TEAM (NETWORK) [phone]   PARENT CHILD HEALTH (Maternity/NICU/Pediatrics) [phone]   [facility] [phone]   [facility] [phone]   [facility] [phone]   [facility] [phone]   [facility] [phone]   [facility] [phone]   [facility] [phone]   [facility] [phone]   [facility] [phone]   [facility] [phone]   [facility] [phone]   [facility] [phone]

## 2024-03-26 NOTE — RESTORATIVE TECHNICIAN NOTE
Restorative Technician Note      Patient Name: Diana Godoy     Restorative Tech Visit Date: 03/26/24  Note Type: Mobility  Patient Position Upon Consult: Other (Comment) (in the BR)  Activity Performed: Ambulated  Assistive Device: Other (Comment) (none)  Patient Position at End of Consult: All needs within reach; Bedside chair

## 2024-03-26 NOTE — ANESTHESIA PREPROCEDURE EVALUATION
Procedure:  Cardiac pacer implant (Chest)     89 yo F with PMHx of HTN, Afib with symptomatic conversion pauses for PPM.     Denies previous complications from anesthesia, denies GERD, JOSEPH. Nonsmoker . METS>4, NPO>8h.    Relevant Problems   CARDIO   (+) Atrial fibrillation with RVR (HCC)   (+) HTN (hypertension)   (+) LBBB (left bundle branch block)   (+) Mixed hyperlipidemia   (+) Paroxysmal A-fib (HCC)      MUSCULOSKELETAL   (+) Neck strain     Lab Results   Component Value Date/Time    WBC 7.23 03/26/2024 05:29 AM    RBC 4.49 03/26/2024 05:29 AM    HGB 13.1 03/26/2024 05:29 AM    HCT 39.1 03/26/2024 05:29 AM     03/26/2024 05:29 AM     Lab Results   Component Value Date/Time    SODIUM 130 (L) 03/26/2024 05:29 AM    K 4.0 03/26/2024 05:29 AM    CL 91 (L) 03/26/2024 05:29 AM    CO2 28 03/26/2024 05:29 AM    BUN 12 03/26/2024 05:29 AM    CREATININE 0.67 03/26/2024 05:29 AM    GLUC 123 03/26/2024 05:29 AM     TTE (3/25/24):    Left Ventricle: Left ventricular cavity size is normal. Wall thickness is severely increased. There is moderate to severe concentric hypertrophy. The left ventricular ejection fraction is 60%. Systolic function is normal. Wall motion is normal. Diastolic function is mildly abnormal, consistent with grade I (abnormal) relaxation.    IVS: There is abnormal septal motion consistent with left bundle branch block.    Right Ventricle: Right ventricular cavity size is normal. Systolic function is normal.    Tricuspid Valve: The right ventricular systolic pressure is mildly elevated. The estimated right ventricular systolic pressure is 34 mmHg.    Physical Exam    Airway    Mallampati score: II  TM Distance: >3 FB  Neck ROM: full     Dental   No notable dental hx     Cardiovascular  Cardiovascular exam normal    Pulmonary  Pulmonary exam normal     Other Findings  post-pubertal.      Anesthesia Plan  ASA Score- 2     Anesthesia Type- IV sedation with anesthesia with ASA Monitors.          Additional Monitors:     Airway Plan:            Plan Factors-Exercise tolerance (METS): >4 METS.    Chart reviewed. EKG reviewed. Imaging results reviewed. Existing labs reviewed. Patient summary reviewed.    Patient is not a current smoker.      Obstructive sleep apnea risk education given perioperatively.        Induction- intravenous.    Postoperative Plan-     Informed Consent- Anesthetic plan and risks discussed with patient.  I personally reviewed this patient with the CRNA. Discussed and agreed on the Anesthesia Plan with the CRNA..

## 2024-03-26 NOTE — ANESTHESIA POSTPROCEDURE EVALUATION
Post-Op Assessment Note    CV Status:  Stable  Pain Score: 0    Pain management: adequate       Mental Status:  Alert and awake   Hydration Status:  Euvolemic   PONV Controlled:  Controlled   Airway Patency:  Patent     Post Op Vitals Reviewed: Yes    No anethesia notable event occurred.    Staff: CRNA               BP   135/61   Temp 96.4   Pulse 61   Resp 10   SpO2 98%

## 2024-03-27 ENCOUNTER — APPOINTMENT (INPATIENT)
Dept: RADIOLOGY | Facility: HOSPITAL | Age: 89
DRG: 242 | End: 2024-03-27
Payer: COMMERCIAL

## 2024-03-27 VITALS
HEIGHT: 63 IN | BODY MASS INDEX: 44.34 KG/M2 | TEMPERATURE: 97.5 F | RESPIRATION RATE: 18 BRPM | OXYGEN SATURATION: 94 % | DIASTOLIC BLOOD PRESSURE: 69 MMHG | HEART RATE: 70 BPM | SYSTOLIC BLOOD PRESSURE: 149 MMHG | WEIGHT: 250.22 LBS

## 2024-03-27 PROBLEM — I50.9 HEART FAILURE (HCC): Status: ACTIVE | Noted: 2024-03-27

## 2024-03-27 PROBLEM — Z95.0 S/P PLACEMENT OF CARDIAC PACEMAKER: Status: ACTIVE | Noted: 2024-03-27

## 2024-03-27 LAB
ANION GAP SERPL CALCULATED.3IONS-SCNC: 8 MMOL/L (ref 4–13)
ATRIAL RATE: 66 BPM
BASOPHILS # BLD AUTO: 0.01 THOUSANDS/ÂΜL (ref 0–0.1)
BASOPHILS NFR BLD AUTO: 0 % (ref 0–1)
BUN SERPL-MCNC: 12 MG/DL (ref 5–25)
CALCIUM SERPL-MCNC: 9.3 MG/DL (ref 8.4–10.2)
CHLORIDE SERPL-SCNC: 92 MMOL/L (ref 96–108)
CO2 SERPL-SCNC: 31 MMOL/L (ref 21–32)
CREAT SERPL-MCNC: 0.86 MG/DL (ref 0.6–1.3)
EOSINOPHIL # BLD AUTO: 0.09 THOUSAND/ÂΜL (ref 0–0.61)
EOSINOPHIL NFR BLD AUTO: 1 % (ref 0–6)
ERYTHROCYTE [DISTWIDTH] IN BLOOD BY AUTOMATED COUNT: 13.2 % (ref 11.6–15.1)
GFR SERPL CREATININE-BSD FRML MDRD: 59 ML/MIN/1.73SQ M
GLUCOSE SERPL-MCNC: 154 MG/DL (ref 65–140)
HCT VFR BLD AUTO: 43.3 % (ref 34.8–46.1)
HGB BLD-MCNC: 14 G/DL (ref 11.5–15.4)
IMM GRANULOCYTES # BLD AUTO: 0.02 THOUSAND/UL (ref 0–0.2)
IMM GRANULOCYTES NFR BLD AUTO: 0 % (ref 0–2)
LYMPHOCYTES # BLD AUTO: 1.34 THOUSANDS/ÂΜL (ref 0.6–4.47)
LYMPHOCYTES NFR BLD AUTO: 19 % (ref 14–44)
MCH RBC QN AUTO: 28.6 PG (ref 26.8–34.3)
MCHC RBC AUTO-ENTMCNC: 32.3 G/DL (ref 31.4–37.4)
MCV RBC AUTO: 88 FL (ref 82–98)
MONOCYTES # BLD AUTO: 0.26 THOUSAND/ÂΜL (ref 0.17–1.22)
MONOCYTES NFR BLD AUTO: 4 % (ref 4–12)
NEUTROPHILS # BLD AUTO: 5.23 THOUSANDS/ÂΜL (ref 1.85–7.62)
NEUTS SEG NFR BLD AUTO: 76 % (ref 43–75)
NRBC BLD AUTO-RTO: 0 /100 WBCS
PLATELET # BLD AUTO: 247 THOUSANDS/UL (ref 149–390)
PMV BLD AUTO: 9.1 FL (ref 8.9–12.7)
POTASSIUM SERPL-SCNC: 3.9 MMOL/L (ref 3.5–5.3)
QRS AXIS: -39 DEGREES
QRSD INTERVAL: 158 MS
QT INTERVAL: 478 MS
QTC INTERVAL: 516 MS
RBC # BLD AUTO: 4.9 MILLION/UL (ref 3.81–5.12)
SODIUM SERPL-SCNC: 131 MMOL/L (ref 135–147)
T WAVE AXIS: 115 DEGREES
VENTRICULAR RATE: 70 BPM
WBC # BLD AUTO: 6.95 THOUSAND/UL (ref 4.31–10.16)

## 2024-03-27 PROCEDURE — 80048 BASIC METABOLIC PNL TOTAL CA: CPT

## 2024-03-27 PROCEDURE — 93010 ELECTROCARDIOGRAM REPORT: CPT | Performed by: INTERNAL MEDICINE

## 2024-03-27 PROCEDURE — 85025 COMPLETE CBC W/AUTO DIFF WBC: CPT

## 2024-03-27 PROCEDURE — NC001 PR NO CHARGE: Performed by: INTERNAL MEDICINE

## 2024-03-27 PROCEDURE — 99238 HOSP IP/OBS DSCHRG MGMT 30/<: CPT | Performed by: INTERNAL MEDICINE

## 2024-03-27 PROCEDURE — 99024 POSTOP FOLLOW-UP VISIT: CPT | Performed by: INTERNAL MEDICINE

## 2024-03-27 PROCEDURE — 71046 X-RAY EXAM CHEST 2 VIEWS: CPT

## 2024-03-27 RX ORDER — AMLODIPINE BESYLATE 10 MG/1
10 TABLET ORAL DAILY
Status: DISCONTINUED | OUTPATIENT
Start: 2024-03-28 | End: 2024-03-27 | Stop reason: HOSPADM

## 2024-03-27 RX ORDER — BISACODYL 10 MG
10 SUPPOSITORY, RECTAL RECTAL DAILY PRN
Status: DISCONTINUED | OUTPATIENT
Start: 2024-03-27 | End: 2024-03-27 | Stop reason: HOSPADM

## 2024-03-27 RX ORDER — METOPROLOL TARTRATE 50 MG/1
100 TABLET, FILM COATED ORAL 2 TIMES DAILY
Status: DISCONTINUED | OUTPATIENT
Start: 2024-03-27 | End: 2024-03-27 | Stop reason: HOSPADM

## 2024-03-27 RX ORDER — AMLODIPINE BESYLATE 10 MG/1
10 TABLET ORAL DAILY
Qty: 90 TABLET | Refills: 0 | Status: SHIPPED | OUTPATIENT
Start: 2024-03-28 | End: 2024-06-26

## 2024-03-27 RX ADMIN — AMLODIPINE BESYLATE 5 MG: 5 TABLET ORAL at 09:31

## 2024-03-27 RX ADMIN — METOPROLOL TARTRATE 50 MG: 50 TABLET, FILM COATED ORAL at 09:31

## 2024-03-27 NOTE — PROGRESS NOTES
INTERNAL MEDICINE RESIDENCY PROGRESS NOTE     Name: Diana Godoy   Age & Sex: 89 y.o. female   MRN: [id number]  Unit/Bed#: [place] 431-01   Encounter: [id number]  Team: SOD Team C     PATIENT INFORMATION     Name: Diana Godoy   Age & Sex: 89 y.o. female   MRN: [id number]  Hospital Stay Days: 3    ASSESSMENT/PLAN     Principal Problem:    Cardiac syncope  Active Problems:    HTN (hypertension)    Paroxysmal A-fib (HCC)    Morbid obesity (HCC)    Urge incontinence of urine    Pre-diabetes    Atrial fibrillation with RVR ([facility])    Hyponatremia    LBBB (left bundle branch block)    s/p Medtronic single chamber PPM 3/26/2024      LBBB (left bundle branch block)  Assessment & Plan  New LBBB demonstrated on EKG as of 03/23  Likely contributing to patient's sinus pauses  S/p Amiodarone load, PPM placement      Plan:  Monitor on tele  Hold home digoxin  Continue reduced dose metoprolol   Continue Xarelto     Hyponatremia  Assessment & Plan  On admission, suspected hypervolemic hyponatremia, perhaps new onset heart failure, now unlikely given patient appears euvolemic and is persistently hyponatremic following diuresis.   Given serum osm < urine osm in the setting of persistent hyponatremia despite diuresis, likely secondary to SIADH    Lab Results   Component Value Date/Time    OSMOUA 310 03/25/2024 11:32 AM    NAUR 34 03/25/2024 11:32 AM    OSMOLALITSER 274 (L) 03/25/2024 11:27 AM        Recent Labs     03/25/24  1127 03/26/24  0529 03/27/24  0810   SODIUM 128* 130* 131*     Plan:  I's and O's  Limit sodium 2 g  Begin 1.5 L fluid restriction daily for suspected SIADH   F/u with PCP outpatient and consider repeat BMP one week following d/c    Atrial fibrillation with RVR (HCC)  Assessment & Plan  On Xarelto as outpatient  At home, takes 100 mg metoprolol twice daily, digoxin 125 mcg p.o. daily  RVR from [place] ED is resolved  Hold off on digoxin  TSH wnl    Plan:  Continue metoprolol 50 mg twice daily  (reduced from home dosing)  Restarted Xarelto after procedure  As needed diltiazem 15 mg every 6 hours for heart rate greater than 110; currently sinus rhythm in the 60s  Maintain on telemetry  Cardiology consulted    Morbid obesity (HCC)  Assessment & Plan  Lifestyle counseling on discharge    Paroxysmal A-fib (HCC)  Assessment & Plan  See plan for A-fib with RVR  Xarelto continued after PPM placement    HTN (hypertension)  Assessment & Plan  Patient home HTN meds held d/t hyponatremia.   Blood pressure persistently elevated with two episodes >180s/110s, home amlodipine was restarted on  d/t persistently high BPs. .   Holding home hydrochlorothiazide 25 mg p.o. daily since admission    Systolic (24hrs), Av , Min:126 , Max:176   Diastolic (24hrs), Av, Min:55, Max:89      Plan:   Increase Amlodipine to 10 mg from 5 mg qd   Will discontinue HCZ in the setting of persistent hyponatremia in the setting of suspected SIADH  Diltiazem 15 mg IV every 6 hours as needed for persistent heart rate greater than 110 (anticipate effect on blood pressure as well)    * Cardiac syncope  Assessment & Plan  89 yo F presenting w/ worsening cardiac syncope most likely due to sinus pauses and possible new onset heart failure  Patient appears euvolemic on admission and has demonstrated sinus pauses in [place] ED    TTE obtained  was remarkable for severely increased left ventricular wall thickness and moderate to severe concentric hypertrophy. The left ventricular ejection fraction is 60%.     S/p PPM placement 3/26.     Plan:  Morning orthostatics  Monitor tele        Disposition: Home today if EP deems appropriate     SUBJECTIVE     Patient seen and examined. No acute events overnight. She denies fever, chills, SOB, CP, N/V, diarrhea, constipation.     OBJECTIVE     Vitals:    24 0420 24 0630 24 1045 24 1100   BP: (!) 176/88 170/84 149/69    Pulse: 71 70 70    Resp: 18 20 18    Temp: 98 °F  (36.7 °C) 98.6 °F (37 °C) 97.5 °F (36.4 °C)    TempSrc: Oral Oral Oral    SpO2: 94% 93%  94%   Weight:       Height:          Temperature:   Temp (24hrs), Av.2 °F (36.8 °C), Min:97.5 °F (36.4 °C), Max:98.6 °F (37 °C)    Temperature: 97.5 °F (36.4 °C)  Intake & Output:  I/O          0701   0700  0701   0700  0701   0700    P.O. 238 360     I.V. (mL/kg)  500 (4.4)     IV Piggyback  50     Total Intake(mL/kg) 238 (2.1) 910 (8.1)     Urine (mL/kg/hr) 502 (0.2) 800 (0.3)     Stool  0     Total Output 502 800     Net -264 +110            Unmeasured Urine Occurrence 3 x 1 x     Unmeasured Stool Occurrence  2 x           Weights:   IBW (Ideal Body Weight): 52.4 kg    Body mass index is 44.32 kg/m².  Weight (last 2 days)       Date/Time Weight    24 0848 113 (250.22)          Physical Exam  Vitals reviewed.   Constitutional:       Appearance: Normal appearance.   HENT:      Head: Normocephalic and atraumatic.      Right Ear: External ear normal.      Left Ear: External ear normal.      Nose: Nose normal.   Cardiovascular:      Rate and Rhythm: Normal rate and regular rhythm.      Heart sounds: Normal heart sounds. No murmur heard.     No friction rub. No gallop.   Pulmonary:      Effort: Pulmonary effort is normal. No respiratory distress.      Breath sounds: Normal breath sounds. No wheezing.   Chest:      Chest wall: Tenderness (appropriate tenderness to pacemaker site) present.   Abdominal:      General: Bowel sounds are normal.   Musculoskeletal:         General: No swelling.      Right lower leg: Edema (+1) present.      Left lower leg: Edema (+1) present.   Skin:     General: Skin is warm and dry.      Capillary Refill: Capillary refill takes less than 2 seconds.   Neurological:      Mental Status: She is alert and oriented to person, place, and time.   Psychiatric:         Mood and Affect: Mood normal.         Behavior: Behavior normal.         Thought Content: Thought content  normal.         Judgment: Judgment normal.       LABORATORY DATA     Labs: I have personally reviewed pertinent reports.  Results from last 7 days   Lab Units 03/27/24  0810 03/26/24  0529 03/25/24  0450   WBC Thousand/uL 6.95 7.23 6.47   HEMOGLOBIN g/dL 14.0 13.1 12.6   HEMATOCRIT % 43.3 39.1 37.5   PLATELETS Thousands/uL 247 219 218   NEUTROS PCT % 76* 73 60   MONOS PCT % 4 8 8   EOS PCT % 1 1 3      Results from last 7 days   Lab Units 03/27/24  0810 03/26/24  0529 03/25/24  1127 03/24/24  0550 03/23/24  1427   POTASSIUM mmol/L 3.9 4.0 3.3*   < > 3.5   CHLORIDE mmol/L 92* 91* 90*   < > 92*   CO2 mmol/L 31 28 29   < > 28   BUN mg/dL 12 12 12   < > 10   CREATININE mg/dL 0.86 0.67 0.71   < > 0.78   CALCIUM mg/dL 9.3 8.9 8.9   < > 9.2   ALK PHOS U/L  --   --   --   --  78   ALT U/L  --   --   --   --  15   AST U/L  --   --   --   --  16    < > = values in this interval not displayed.     Results from last 7 days   Lab Units 03/25/24  0450 03/24/24  0550   MAGNESIUM mg/dL 2.1 1.7*                        IMAGING & DIAGNOSTIC TESTING     Radiology Results: I have personally reviewed pertinent reports.  No results found.  Other Diagnostic Testing: I have personally reviewed pertinent reports.    ACTIVE MEDICATIONS     Current Facility-Administered Medications   Medication Dose Route Frequency    [START ON 3/28/2024] amLODIPine (NORVASC) tablet 10 mg  10 mg Oral Daily    atorvastatin (LIPITOR) tablet 10 mg  10 mg Oral Daily With Dinner    bisacodyl (DULCOLAX) rectal suppository 10 mg  10 mg Rectal Daily PRN    diphenhydrAMINE (BENADRYL) tablet 25 mg  25 mg Oral Q6H PRN    melatonin tablet 3 mg  3 mg Oral HS PRN    metoprolol tartrate (LOPRESSOR) tablet 50 mg  50 mg Oral BID    polyethylene glycol (MIRALAX) packet 17 g  17 g Oral Daily PRN    rivaroxaban (XARELTO) tablet 20 mg  20 mg Oral Daily With Dinner    senna (SENOKOT) tablet 8.6 mg  1 tablet Oral HS       VTE Pharmacologic Prophylaxis: Xarelto  VTE Mechanical  "Prophylaxis: sequential compression device    Portions of the record may have been created with voice recognition software.  Occasional wrong word or \"sound a like\" substitutions may have occurred due to the inherent limitations of voice recognition software.  Read the chart carefully and recognize, using context, where substitutions have occurred.  ==  Jasmeet Ball MD  [facility]  Internal Medicine Residency PGY-1       "

## 2024-03-27 NOTE — PROGRESS NOTES
Progress Note - Electrophysiology  Diana Godoy 89 y.o. female MRN: [id number]  Unit/Bed#: [facility] 431-01 Encounter: [id number]      Assessment/Plan   Assessment/Plan:  Tachy jose francisco syndrome, conversion pauses s/p Medtronic single chamber PPM 3/26/2024 by Dr. Mccoy  -- device interrogation showed normal device function with stable lead parameters  -- post op chest x-ray showed proper lead placement, no pneumothorax  -- Left chest implant site healing well  -- keep Aquacell dressing in place x 1 week  -- ok to get incision wet in shower after 1 week  -- arm restrictions x 6 weeks  -- remote monitor paired with device  -- pain control  -- discharge instructions reviewed with patient in detail  -- F/U in 2 weeks for wound check  -- stable for D/C from EP standpoint    2. PAF  -- Xarelto resumed  -- increase metoprolol back to 100 mg bid  -- D/C digoxin  -- will not continue amiodarone (started at [facility])  -- F/U with cardiology at [facility]    3. HTN  -- HCTZ D/C'd due to bradycardia   -- continue amlodipine and metoprolol     Subjective/Objective   Subjective: Diana Godoy is a 89 year old female with a history of paroxsymal atrial fibrillation, HTN, HLD, who had two episodes of syncope at home and was found to have have afib with long symptomatic conversion pauses at [place] ED so transferred to [place] for consideration of pacemaker. She underwent implantation of a Medtronic single chamber PPM on 3/26/2024.     Today, she is feeling well. She denies chest pain, SOB, lightheadedness, dizziness, orthopnea, or PND. She denies pain at the implant site.     Telemetry: sinus rhythm    Device Interrogation 3/27/2024: normal device function with stable lead parameters    Chest x-ray 3/27/2024: proper lead placement with no pneumothorax    Chest x-ray 3/26/2024: proper lead placement with no pneumothorax    Review of Systems:  ROS as noted above, otherwise 12 point review of systems was performed and is  "negative.     Objective:  Vital Signs: /69   Pulse 70   Temp 97.5 °F (36.4 °C) (Oral)   Resp 18   Ht 5' 3\" (1.6 m)   Wt 113 kg (250 lb 3.6 oz)   SpO2 94%   BMI 44.32 kg/m²     Physical Exam:  GEN: NAD, alert and oriented x 3, well appearing  SKIN: warm, dry without significant lesions or rashes. Left chest implant site C/D/I, no erythema or hematoma. No signs of infection.   HEENT: NCAT  NECK: supple, no JVD appreciated  CARDIOVASCULAR: RRR, normal S1, S2 without murmurs, rubs, or gallops   LUNGS: CTA bilaterally without wheezes, rhonchi, or rales  ABDOMEN: Soft, nontender, nondistended.  EXTREMITIES/VASCULAR: perfused without clubbing, cyanosis, or LE edema b/l  PSYCH: Normal mood and affect  NEURO: CN ll-Xll grossly intact    Vitals:    03/24/24 1428 03/25/24 0848   Weight: 113 kg (250 lb) 113 kg (250 lb 3.6 oz)     Orthostatic Blood Pressures      Flowsheet Row Most Recent Value   Blood Pressure 149/69 filed at 03/27/2024 1045   Patient Position - Orthostatic VS Lying filed at 03/25/2024 1925            Intake/Output Summary (Last 24 hours) at 3/27/2024 1311  Last data filed at 3/27/2024 1219  Gross per 24 hour   Intake 1624 ml   Output 400 ml   Net 1224 ml     Invasive Devices       Peripheral Intravenous Line  Duration             Peripheral IV 03/26/24 Left Antecubital <1 day                        Scheduled Meds:  Current Facility-Administered Medications   Medication Dose Route Frequency Provider Last Rate    [START ON 3/28/2024] amLODIPine  10 mg Oral Daily Jasmeet Ball MD      atorvastatin  10 mg Oral Daily With Dinner Vasiliy Navarrete MD      bisacodyl  10 mg Rectal Daily PRN Gloria Yadav DO      diphenhydrAMINE  25 mg Oral Q6H PRN Vasiliy Navarrete MD      melatonin  3 mg Oral HS PRN Vasiliy Navarrete MD      metoprolol tartrate  50 mg Oral BID Vasiliy Navarrete MD      polyethylene glycol  17 g Oral Daily PRN Vasiliy Navarrete MD      rivaroxaban  20 mg Oral Daily With Dinner Krysta Soden, " PA-C      senna  1 tablet Oral HS Vasiliy Navarrete MD       Continuous Infusions:   PRN Meds:.  bisacodyl    diphenhydrAMINE    melatonin    polyethylene glycol          Lab Results: I have personally reviewed pertinent lab results.    Results from last 7 days   Lab Units 03/27/24  0810 03/26/24  0529 03/25/24  0450   WBC Thousand/uL 6.95 7.23 6.47   HEMOGLOBIN g/dL 14.0 13.1 12.6   HEMATOCRIT % 43.3 39.1 37.5   PLATELETS Thousands/uL 247 219 218     Results from last 7 days   Lab Units 03/27/24  0810 03/26/24  0529 03/25/24  1127   POTASSIUM mmol/L 3.9 4.0 3.3*   CHLORIDE mmol/L 92* 91* 90*   CO2 mmol/L 31 28 29   BUN mg/dL 12 12 12   CREATININE mg/dL 0.86 0.67 0.71   CALCIUM mg/dL 9.3 8.9 8.9         Results from last 7 days   Lab Units 03/25/24  0450 03/24/24  0550   MAGNESIUM mg/dL 2.1 1.7*

## 2024-03-27 NOTE — ASSESSMENT & PLAN NOTE
Wt Readings from Last 3 Encounters:   03/25/24 113 kg (250 lb 3.6 oz)   03/24/24 114 kg (250 lb 7.1 oz)   02/26/24 115 kg (253 lb)       Acute HF in setting of SSS w/ frequent pauses. LVEF of 60%, BNP of 457. Treated with IV lasix.

## 2024-03-27 NOTE — RESTORATIVE TECHNICIAN NOTE
Restorative Technician Note      Patient Name: Diana Godoy     Restorative Tech Visit Date: 03/27/24  Note Type: Mobility  Patient Position Upon Consult: Bedside chair  Activity Performed: Ambulated  Assistive Device: Other (Comment) (HHA x 1)  Patient Position at End of Consult: All needs within reach; Bedside chair

## 2024-03-27 NOTE — PLAN OF CARE
Problem: PAIN - ADULT  Goal: Verbalizes/displays adequate comfort level or baseline comfort level  Description: Interventions:  - Encourage patient to monitor pain and request assistance  - Assess pain using appropriate pain scale  - Administer analgesics based on type and severity of pain and evaluate response  - Implement non-pharmacological measures as appropriate and evaluate response  - Consider cultural and social influences on pain and pain management  - Notify physician/advanced practitioner if interventions unsuccessful or patient reports new pain  3/27/2024 1439 by Lizbeth Dupont RN  Outcome: Adequate for Discharge  3/27/2024 1122 by Lizbeth Dupont, RN  Outcome: Progressing

## 2024-03-27 NOTE — DISCHARGE INSTR - AVS FIRST PAGE
Your medications were changed:  Please stop taking digoxin  Please stop taking hydrochlorathiazide  Please increase dose of amlodipine to 10mg daily  Please continue the same dose of metoprolol    Please return to the ED if your symptoms worsen or fail to improve.       Please refer to post pacemaker implantation discharge instructions and restrictions and your pacemaker booklet/temporary card.     Keep incision dry for one week. Do not use lotions/powders/creams on incision.     Leave outer bandage in place for 1 week - it is water proof, and as long as it is fully adhered to your skin you may shower with it.  If it appears as though the bandage is coming off and/or there is any communication to the area of device incision, please then keep the whole area dry for the remaining week.  After 1 week, please remove by pulling all edges away from the center of the bandage.    No overhead reaching/pushing/pulling/lifting greater than 5-10lbs with left arm for six weeks. Please call the office if you notice redness, swelling, bleeding, or drainage from incision or if you develop fevers.       AFTER PACEMAKER CARE:    If you have any questions, please call [phone] to speak with a nurse (8:30am-4pm, or [phone] after hours). For appointments, please call [phone].      WHAT YOU SHOULD KNOW:   A pacemaker is a small, battery-powered device that is placed under your skin in your upper chest area with wires placed through a vein that lead directly into the heart. It helps regulate your heart rate and prevent your heart from beating too slowly.                 AFTER YOU LEAVE:     Medicines:     Pain medicine:  You may need medicine to take away or decrease pain.     Learn how to take your medicine. Ask what medicine and how much you should take. Be sure you know how, when, and how often to take it. Usually Over the counter pain medicine is sufficient to control pain (Acetominophen or Ibuprofen) Ask your doctor if  you may take these. If this does not control your pain, narcotic pain killers may be prescribed, please call if you need prescription.     Do not wait until the pain is severe before you take your medicine. Tell caregivers if your pain does not decrease.    Pain medicine can make you dizzy or sleepy. Prevent falls by calling someone when you get out of bed or if you need help.        Take your medicine as directed.  Call your healthcare provider if you think your medicine is not helping or if you have side effects. Tell him if you are allergic to any medicine.    Follow up with your cardiologist after your procedure:  You will need a follow-up visit approximately 2 weeks after you leave the hospital. Your cardiologist will check your wound and make sure that your pacemaker is working correctly.     Follow the instructions to check your pacemaker:  Your cardiologist or primary healthcare provider will check your pacemaker and the battery regularly.  He will use a computer to check your pacemaker over the telephone or wireless device which will be given to you.     Pacemaker batteries usually last 8 to 10 years. The pacemaker unit will be replaced when the battery gets low. This is a simpler procedure than the original one to implant your pacemaker.    Wound care:  Keep your incision dry for one week.  Do not use lotions/powders/creams on incision.     Leave outer bandage in place for 1 week - it is water proof, and as long as it is fully adhered to your skin you may shower with it.  If it appears as though the bandage is coming off and/or there is any communication to the area of device incision, please then keep the whole area dry for the remaining week.  After 1 week, please remove by pulling all edges away from the center of the bandage.    Please call the office if you notice redness, swelling, bleeding, or drainage from incision or if you develop fevers.       Activity:   Arm movement and lifting:  Be careful  using the arm on the side of your pacemaker. Do not move your arm for the first 24 hours after your procedure. Do not  lift your arm above your shoulder or lift more than 10 pounds for one month after your procedure. Avoid pushing, pulling, or repetitive arm movements for one month. This helps the leads stay in place and helps your wound heal. Ask your caregiver when you can drive after your procedure. You may move your arm side to side without lifting above your shoulder, and do not need to wear a sling at home.   Driving: you are ok to drive 48 hours after pacemaker is implanted   Sports:  Ask your caregiver when it is okay to play tennis, golf, basketball, or any sport that requires you to lift your arms. Do not play full contact sports, such as football, that could damage your pacemaker. Ask your cardiologist or primary healthcare provider how much and what kinds of physical activity are safe for you.    Living with a pacemaker:   Tell all caregivers you have a pacemaker:  This includes surgeons, radiologists, and medical technicians. You may want to wear a medical alert ID bracelet or necklace that states that you have a pacemaker.    Carry your pacemaker ID card:  Make sure you receive a pacemaker ID card. Carry it with you at all times. It lists important information about your pacemaker. Show it to airport security if you travel.     Avoid electrical interference:  Avoid welding equipment and other equipment with large magnets or electric fields. These things could interfere with how your pacemaker works. Use your cell phone on the ear opposite from your pacemaker. Do not carry your cell phone in your shirt pocket over your chest.     Some Pacemakers are MRI safe. Ask you doctor if it is safe to proceed with MRI and let the radiologist and staff know you have a pacemaker.     Do not touch the skin around your pacemaker:  This can cause damage to the lead wires or move the pacemaker unit from where it should  be.    Contact your cardiologist or primary healthcare provider if:   The area around your pacemaker has increasing amount of pain after surgery. The pain should improve over first few days after implantation.     The skin around your stitches has increasing redness, swelling, or has drainage. This may mean that you have an infection.     You have a fever.     You have chills, a cough, and feel weak or achy. These are also signs of infection.    Your feet or ankles are more swollen than your baseline.     Your Heart rate is less than 50 beats per minute     Seek care immediately if:   Your bandage becomes soaked with blood.     Your pacemaker is swelling rapidly    Your stitches open up.     You feel your heart suddenly beating very slowly or quickly.    You become too weak or dizzy to stand, or you pass out.     Your arm or leg feels warm, tender, and painful. It may look swollen and red.    You have chest pain that does not go away with rest or medicine.     You feel lightheaded, short of breath, and have chest pain.     You cough up blood.        © 2014 [employer]. Information is for End User's use only and may not be sold, redistributed or otherwise used for commercial purposes. All illustrations and images included in CareNotes® are the copyrighted property of [employer] or [facility].  The above information is an  only. It is not intended as medical advice for individual conditions or treatments. Talk to your doctor, nurse or pharmacist before following any medical regimen to see if it is safe and effective for you.

## 2024-03-27 NOTE — DISCHARGE SUMMARY
INTERNAL MEDICINE RESIDENCY DISCHARGE SUMMARY     Diana Godoy   89 y.o. female  MRN: [id number]  Room/Bed: [facility] 431/[facility] [address] BE [facility] 4 MED SURG/SD   Encounter: [id number]    Principal Problem:    Cardiac syncope  Active Problems:    HTN (hypertension)    Paroxysmal A-fib (HCC)    Morbid obesity (HCC)    Urge incontinence of urine    Pre-diabetes    Atrial fibrillation with RVR ([facility])    Hyponatremia    LBBB (left bundle branch block)    s/p Medtronic single chamber PPM 3/26/2024    Heart failure (HCC)      Heart failure ([facility])  Assessment & Plan  Wt Readings from Last 3 Encounters:   03/25/24 113 kg (250 lb 3.6 oz)   03/24/24 114 kg (250 lb 7.1 oz)   02/26/24 115 kg (253 lb)       Acute HF in setting of SSS w/ frequent pauses. LVEF of 60%, BNP of 457. Treated with IV lasix.           LBBB (left bundle branch block)  Assessment & Plan  New LBBB demonstrated on EKG as of 03/23  Likely contributing to patient's sinus pauses  S/p Amiodarone load, PPM placement      Plan:  Hold home digoxin  Continue reduced dose metoprolol outpatient  Continue Xarelto     Hyponatremia  Assessment & Plan  On admission, suspected hypervolemic hyponatremia, perhaps new onset heart failure, now unlikely given patient appears euvolemic and is persistently hyponatremic following diuresis.   Given serum osm < urine osm in the setting of persistent hyponatremia despite diuresis, likely secondary to SIADH    Lab Results   Component Value Date/Time    OSMOUA 310 03/25/2024 11:32 AM    NAUR 34 03/25/2024 11:32 AM    OSMOLALITSER 274 (L) 03/25/2024 11:27 AM        Recent Labs     03/25/24  1127 03/26/24  0529 03/27/24  0810   SODIUM 128* 130* 131*     Plan:  Limit sodium 2 g  Continue 1.5 L fluid restriction daily for suspected SIADH   F/u with PCP outpatient and consider repeat BMP one week following d/c    Atrial fibrillation with RVR ([facility])  Assessment & Plan  On Xarelto as  "outpatient  At home, takes 100 mg metoprolol twice daily, digoxin 125 mcg p.o. daily  RVR from [place] ED is resolved  Digoxin held this admission  TSH wnl    Plan:  Continue metoprolol 50 mg twice daily (reduced from home dosing)  Continue Xarelto    Morbid obesity (HCC)  Assessment & Plan  Lifestyle counseling on discharge    Paroxysmal A-fib (HCC)  Assessment & Plan  See plan for A-fib with RVR  Xarelto continued after PPM placement    HTN (hypertension)  Assessment & Plan  Patient home HTN meds held d/t hyponatremia.   Blood pressure persistently elevated with two episodes >180s/110s, home amlodipine was restarted on  d/t persistently high BPs. .   Holding home hydrochlorothiazide 25 mg p.o. daily since admission    Systolic (24hrs), Av , Min:126 , Max:176   Diastolic (24hrs), Av, Min:55, Max:89      Plan:   Increase Amlodipine to 10 mg from 5 mg qd   Discontinue HCZ in the setting of persistent hyponatremia in the setting of suspected SIADH    * Cardiac syncope  Assessment & Plan  89 yo F presenting w/ worsening cardiac syncope most likely due to sinus pauses and possible new onset heart failure  Patient appears euvolemic on admission and has demonstrated sinus pauses in [place] ED    TTE obtained  was remarkable for severely increased left ventricular wall thickness and moderate to severe concentric hypertrophy. The left ventricular ejection fraction is 60%.     S/p PPM placement 3/26.     Plan:  Outpatient follow up with cardiology following pacemaker placement        DETAILS OF HOSPITAL COURSE     Per HPI: \" Patient is a 89-year-old female, history of hypertension, PAF on Xarelto, hyponatremia, tachybradycardia syndrome, who presents with syncope.  Patient states that she has had 3 syncopal episodes in the past month at home.  The first 2 occurred while sitting at the kitchen counter, and the third occurred while in front of the refrigerator.  Patient's family (children) present at the " "bedside to provide more history.  They state that the patient has been lightheaded and short of breath while at rest intermittently for the past 3 to 4 weeks.  The syncopal episodes seem to have happened more frequently in the last few weeks leading up to admission.  Patient is unable to lay flat to sleep as she says she is a side sleeper, but does not state that she needs to prop herself up with any pillows.  She denies chest pain, palpitations, current shortness of breath she denies fever, weakness.     At outside campus, patient was noted to have new onset left bundle branch block which is new from prior EKGs.  EKG on arrival to that campus also showed A-fib with RVR, which was self-limited.  While in the ED at [facility], patient was in and out of A-fib but was not repeatedly in RVR.  Patient was also noted to have a few sinus pauses on telemetry, new from prior cardiology history.     Cardiology saw and evaluated patient at [facility].  They recommended to discontinue patient's digoxin and lower Lopressor to 50 mg twice daily from 100 mg twice daily.  They recommended holding hydrochlorothiazide and initiating amiodarone to maintain sinus rhythm and prevent recurrent conversion pauses with a p.o. amiodarone load.     TTE was ordered at [facility] but was not completed in time as patient was already transferred to our campus.  This has since been ordered.  Decision to place temporary wire was deferred at this time by cardiology.     Initial troponins were 10, then 15.  Patient was then transferred to [facility].  Patient currently denies chest pain, shortness of breath, jaw pain, arm pain, numbness.     Patient denies history of tobacco, recreational drugs.  She states that she is a social drinker/drinks at special events, but otherwise does not keep alcohol around the home.  She drinks about 1 cup of coffee per day.\"    Patient was admitted to the internal medicine service in stable condition. Throughout " admission, patient remained stable and no further sinus pauses were captured on telemetry. She did not experience any further syncopal episodes during admission.    Echocardiogram [facility] demonstrated left ventricular wall thickness that is severely increased and moderate to severe concentric hypertrophy. The left ventricular ejection fraction is 60%. Systolic function is normal. Wall motion is normal. Diastolic function is mildly abnormal, consistent with grade I (abnormal) relaxation. Patient's xarelto was held at this time, and cardiology proceeded with permanent pacemaker placement on 03/26 as cardiac syncope was likely related to a fib with sinus pauses.     Of note, patient has history of hyponatremia. On admission (3/24), Na was found to be 127. At that time, hyponatremia was thought to be secondary to hypervolemia in the setting of possible underlying heart failure. 20 mg IV lasix was administered on 03/25 and patient was placed on strict I's and O's. Urine sodium, creatinine, osmolality, and serum osmolality were obtained to work up other etiology of hyponatremia. Patient appeared euvolemic on 03/26, though urine studies and serum osmolality suggest SIADH as an etiology. Diuresis was stopped on 03/26, and patient was placed on 1.5L fluid restriction. Sodium levels improved to 131 on day of discharge. She has remained asymptomatic. Additionally, decision to permanently discontinue patient's home HCTZ was made given persistent hyponatremia.    During admission, patient also noted to have had persistently high blood pressure with asymptomatic episodes of BPs in the 170s/90s. Patient's home amlodipine was increased from 5mg to 10mg given persistently high blood pressures and discontinuation of home HCTZ.     Electrophysiology recommended discontinuation of digoxin and amiodarone    Per cardiology, patient clear for discharge following PPM placement.     Patient is stable and is ready for discharge.        DISCHARGE INFORMATION     PCP at Discharge: Ezio Mckenna MD    Admitting Provider: Pete Sanches MD  Admission Date: 3/24/2024    Discharge Provider: Pete Sanches MD  Discharge Date: 03/27/2024     Discharge Disposition: [facility]  Discharge Condition: good  Discharge with Lines: no    Discharge Diet:  Fluid Restriction of 1.5L, Cardiac diet  Activity Restrictions:  Limit L arm movement due to pacemaker placement  Test Results Pending at Discharge: N/A    Discharge Diagnoses:  Principal Problem:    Cardiac syncope  Active Problems:    HTN (hypertension)    Paroxysmal A-fib ([facility])    Morbid obesity ([facility])    Urge incontinence of urine    Pre-diabetes    Atrial fibrillation with RVR ([facility])    Hyponatremia    LBBB (left bundle branch block)    s/p Medtronic single chamber PPM 3/26/2024    Heart failure ([facility])  Resolved Problems:    * No resolved hospital problems. *      Consulting Providers: Electrophysiology/Cardiology      Diagnostic & Therapeutic Procedures Performed:  XR chest portable    Result Date: 3/27/2024  Impression: Left-sided pacemaker with single transvenous lead. No pneumothorax or infiltrates. Workstation performed: [id number]     XR chest 2 views    Result Date: 3/27/2024  Impression: Pacemaker well-positioned with no pneumothorax. Workstation performed: [id number]       Code Status: Level 1 - Full Code  Advance Directive & Living Will: <no information>  Power of : Yes  POLST:       Current Discharge Medication List        STOP taking these medications       digoxin (Digox) 0.125 mg tablet Comments:   Reason for Stopping:         hydroCHLOROthiazide 25 mg tablet Comments:   Reason for Stopping:             Current Discharge Medication List        Current Discharge Medication List        CONTINUE these medications which have CHANGED    Details   amLODIPine (NORVASC) 10 mg tablet Take 1 tablet (10 mg total) by mouth daily Do not start before March 28, 2024.  Qty: 90 tablet,  Refills: 0    Associated Diagnoses: Atrial fibrillation (HCC)           Current Discharge Medication List        CONTINUE these medications which have NOT CHANGED    Details   atorvastatin (LIPITOR) 10 mg tablet Take 1 tablet (10 mg total) by mouth daily  Qty: 90 tablet, Refills: 3    Associated Diagnoses: Mixed hyperlipidemia      cholecalciferol (VITAMIN D3) 400 units tablet Take 1 tablet by mouth daily      cyanocobalamin (VITAMIN B-12) 100 mcg tablet Take by mouth      ELDERBERRY PO Take by mouth      FISH OIL-KRILL OIL PO Take by mouth      metoprolol tartrate (LOPRESSOR) 100 mg tablet Take 1 tablet (100 mg total) by mouth 2 (two) times a day  Qty: 180 tablet, Refills: 3    Associated Diagnoses: Primary hypertension      Multiple Vitamin (Daily Value Multivitamin) TABS Take by mouth      rivaroxaban (XARELTO) 20 mg tablet Take 1 tablet (20 mg total) by mouth daily  Qty: 90 tablet, Refills: 3    Associated Diagnoses: Paroxysmal A-fib (HCC)      zinc gluconate 50 mg tablet Take 50 mg by mouth daily              Allergies:  Allergies   Allergen Reactions    Lisinopril Hives     Other reaction(s): Angioedema    Oxycodone-Acetaminophen Rash and GI Intolerance       FOLLOW-UP     PCP Outpatient Follow-up:  yes      Follow up: Hospital follow up and Hyponatremia recheck  Date and time: [facility]   Location: [facility]  Follow up within next: 1 week    Consulting Providers Follow-up:  yes      Physician name: Cardiology Olena andrade La Conner  Specialty: Cardiology  Office phone number: [phone]  Follow up within next: 2 weeks (04/11/2024 10:30 AM)    Active Issues Requiring Follow-up:   yes     Issue: Pacemaker site check and interro  Responsible Individual: Cardiology Olena andrade La Conner  What is Needed: Pacemaker site check and follow-up  Follow-up Appointments Arranged: Yes        Issue: Hyponatremia  Responsible Individual: Dr. Mckenna  What is Needed: Follow up BMP   Follow-up Appointments  "Arranged: No     Discharge Statement:   I spent 20 minutes minutes discharging the patient. This time was spent on the day of discharge. I had direct contact with the patient on the day of discharge. Additional documentation is required if more than 30 minutes were spent on discharge.    Portions of the record may have been created with voice recognition software.  Occasional wrong word or \"sound a like\" substitutions may have occurred due to the inherent limitations of voice recognition software.  Read the chart carefully and recognize, using context, where substitutions have occurred.    ==  Jasmeet Ball MD  [facility]  Internal Medicine Resident PGY-1   "

## 2024-03-28 ENCOUNTER — TRANSITIONAL CARE MANAGEMENT (OUTPATIENT)
Dept: FAMILY MEDICINE CLINIC | Facility: CLINIC | Age: 89
End: 2024-03-28

## 2024-03-28 NOTE — UTILIZATION REVIEW
NOTIFICATION OF ADMISSION DISCHARGE   This is a Notification of Discharge from [facility]. Please be advised that this patient has been discharge from our facility. Below you will find the admission and discharge date and time including the patient’s disposition.   UTILIZATION REVIEW CONTACT:  Milton Joseph  Utilization   Network Utilization Review Department  Phone: [phone] x carefully listen to the prompts. All voicemails are confidential.  Email: [email or web address]     ADMISSION INFORMATION  PRESENTATION DATE: 3/24/2024  2:07 PM  OBERVATION ADMISSION DATE:   INPATIENT ADMISSION DATE: 3/24/24  2:07 PM   DISCHARGE DATE: 3/27/2024  3:18 PM   DISPOSITION:Home/Self Care    Network Utilization Review Department  ATTENTION: Please call with any questions or concerns to [phone] and carefully listen to the prompts so that you are directed to the right person. All voicemails are confidential.   For Discharge needs, contact Care Management DC Support Team at [phone] opt. 2  Send all requests for admission clinical reviews, approved or denied determinations and any other requests to dedicated fax number below belonging to the campus where the patient is receiving treatment. List of dedicated fax numbers for the Facilities:  FACILITY NAME UR FAX NUMBER   ADMISSION DENIALS (Administrative/Medical Necessity) [phone]   DISCHARGE SUPPORT TEAM ([facility]) [phone]   PARENT CHILD HEALTH (Maternity/NICU/Pediatrics) [phone]   [facility] [phone]   [facility] [phone]   [facility] [phone]   [facility] [phone]   [facility] [phone]   [facility] [phone]   [facility] [phone]   [facility] [phone]   [facility]  [facility] [phone]   [facility] [phone]   [facility] [phone]   [facility] [phone]

## 2024-04-08 ENCOUNTER — OFFICE VISIT (OUTPATIENT)
Dept: FAMILY MEDICINE CLINIC | Facility: CLINIC | Age: 89
End: 2024-04-08
Payer: COMMERCIAL

## 2024-04-08 VITALS
HEIGHT: 63 IN | SYSTOLIC BLOOD PRESSURE: 132 MMHG | WEIGHT: 261.4 LBS | TEMPERATURE: 97.5 F | BODY MASS INDEX: 46.32 KG/M2 | DIASTOLIC BLOOD PRESSURE: 76 MMHG | OXYGEN SATURATION: 97 % | HEART RATE: 93 BPM

## 2024-04-08 DIAGNOSIS — I10 PRIMARY HYPERTENSION: ICD-10-CM

## 2024-04-08 DIAGNOSIS — I48.91 ATRIAL FIBRILLATION (HCC): ICD-10-CM

## 2024-04-08 DIAGNOSIS — I50.22 CHRONIC SYSTOLIC CONGESTIVE HEART FAILURE (HCC): ICD-10-CM

## 2024-04-08 DIAGNOSIS — I48.91 ATRIAL FIBRILLATION WITH RVR (HCC): Primary | ICD-10-CM

## 2024-04-08 PROBLEM — I49.5 SICK SINUS SYNDROME (HCC): Status: ACTIVE | Noted: 2024-04-08

## 2024-04-08 PROBLEM — I50.20 SYSTOLIC CONGESTIVE HEART FAILURE (HCC): Status: ACTIVE | Noted: 2024-03-27

## 2024-04-08 PROCEDURE — 99495 TRANSJ CARE MGMT MOD F2F 14D: CPT | Performed by: FAMILY MEDICINE

## 2024-04-08 RX ORDER — AMLODIPINE BESYLATE 10 MG/1
5 TABLET ORAL DAILY
Start: 2024-04-08 | End: 2024-07-07

## 2024-04-08 RX ORDER — TORSEMIDE 10 MG/1
10 TABLET ORAL DAILY
Qty: 30 TABLET | Refills: 5 | Status: SHIPPED | OUTPATIENT
Start: 2024-04-08

## 2024-04-08 RX ORDER — METOPROLOL TARTRATE 100 MG/1
150 TABLET ORAL 2 TIMES DAILY
Start: 2024-04-08

## 2024-04-08 NOTE — PROGRESS NOTES
Chief Complaint   Patient presents with    Transition of Care Management     TCM-Cardiac syncope          HPI   Here for follow-up of hospitalization because of paroxysmal atrial fibrillation with pauses noted.  Symptomatically, 3 episodes of syncope occurred at home.  Shortness of breath and lightheadedness over the last 3 weeks prior to hospitalization.  She received a pacemaker on 3/26.  Medications were adjusted.  Hydrochlorothiazide was stopped after she was given Lasix for heart failure.  She was discharged on a higher dose of amlodipine.  Found to have acute heart failure in the setting of sick sinus syndrome.  Ejection fraction 60%.  BNP was 457.  Presently, gets short of breath at times.  Sometimes on the Apple Watch, she is in a normal rhythm but simultaneously, her pulse ox shows an irregular rhythm.    Complains of difficulty sleeping. Didn't do well on melatonin.       Past Medical History:   Diagnosis Date    Afib (HCC)     Colon polyp 04/19/2018    Formatting of this note might be different from the original. Colonoscopy 4/19/18    Hyperlipidemia     Hypertension     Pre-diabetes 10/10/2022    A1c about 6.2 since 2018    s/p Medtronic single chamber PPM 3/26/2024 03/27/2024    Stroke-like symptoms: vertigo and gait imbalance 09/22/2022    Urge incontinence of urine 05/06/2016        Past Surgical History:   Procedure Laterality Date    CARDIAC ELECTROPHYSIOLOGY PROCEDURE N/A 3/26/2024    Procedure: Cardiac pacer implant;  Surgeon: Morgan Mccoy MD;  Location: [facility] CARD[facility];  Service: Cardiology    HYSTERECTOMY      KNEE SURGERY Bilateral 2012       Social History     Tobacco Use    Smoking status: Never     Passive exposure: Past (father smoked)    Smokeless tobacco: Never   Substance Use Topics    Alcohol use: Never       Social History     Social History Narrative     since 2005 after 54 year marriage.      Three children.      Six grandchildren.  Seven great grandchildren.      Lives  "alone.  In her own house [place].  2 daughters live very close.    Drives.    -has a 92-year-old sister, 82-year-old brother and a 79-year-old sister.  Lost 1 sibling. Who  at 85.        The following portions of the patient's history were reviewed and updated as appropriate: allergies, current medications, past family history, past medical history, past social history, past surgical history, and problem list.      Review of Systems       /76 (BP Location: Left arm, Patient Position: Sitting, Cuff Size: Large)   Pulse 93   Temp 97.5 °F (36.4 °C) (Temporal)   Ht 5' 3\" (1.6 m)   Wt 119 kg (261 lb 6.4 oz)   SpO2 97%   BMI 46.30 kg/m²      Physical Exam   Pound weight gain noted since hospitalization.  Peers comfortable.  Lungs are clear.  Heart rate 120 irregular irregular.  Mild brawny edema present.              Current Outpatient Medications:     amLODIPine (NORVASC) 10 mg tablet, Take 1 tablet (10 mg total) by mouth daily Do not start before 2024., Disp: 90 tablet, Rfl: 0    atorvastatin (LIPITOR) 10 mg tablet, Take 1 tablet (10 mg total) by mouth daily, Disp: 90 tablet, Rfl: 3    cholecalciferol (VITAMIN D3) 400 units tablet, Take 1 tablet by mouth daily, Disp: , Rfl:     cyanocobalamin (VITAMIN B-12) 100 mcg tablet, Take by mouth, Disp: , Rfl:     ELDERBERRY PO, Take by mouth, Disp: , Rfl:     FISH OIL-KRILL OIL PO, Take by mouth, Disp: , Rfl:     metoprolol tartrate (LOPRESSOR) 100 mg tablet, Take 1 tablet (100 mg total) by mouth 2 (two) times a day, Disp: 180 tablet, Rfl: 3    Multiple Vitamin (Daily Value Multivitamin) TABS, Take by mouth, Disp: , Rfl:     rivaroxaban (XARELTO) 20 mg tablet, Take 1 tablet (20 mg total) by mouth daily, Disp: 90 tablet, Rfl: 3    zinc gluconate 50 mg tablet, Take 50 mg by mouth daily, Disp: , Rfl:      No problem-specific Assessment & Plan notes found for this encounter.       Diagnoses and all orders for this visit:    Atrial fibrillation " with RVR (HCC)    Primary hypertension    Atrial fibrillation (HCC)    Chronic systolic congestive heart failure (HCC)        There are no Patient Instructions on file for this visit.

## 2024-04-08 NOTE — PATIENT INSTRUCTIONS
Hospitalization reviewed.  Presently in atrial fibrillation and by history, sounds like she is going in and out.  Xarelto protects against the stroke.  Continue 20 mg daily.  To control heart rate, increase metoprolol to 150 mg twice daily.  Decrease amlodipine to 5 mg daily.  Begin torsemide 10 mg daily for the extra fluid.  Has cardiology follow-up on the 19th.    Recheck in 1 month.

## 2024-04-11 ENCOUNTER — IN-CLINIC DEVICE VISIT (OUTPATIENT)
Dept: CARDIOLOGY CLINIC | Facility: CLINIC | Age: 89
End: 2024-04-11

## 2024-04-11 DIAGNOSIS — Z95.0 CARDIAC PACEMAKER IN SITU: Primary | ICD-10-CM

## 2024-04-11 PROCEDURE — 99024 POSTOP FOLLOW-UP VISIT: CPT | Performed by: INTERNAL MEDICINE

## 2024-04-11 NOTE — PROGRESS NOTES
Results for orders placed or performed in visit on 04/11/24   Cardiac EP device report    Narrative    MDT SINGLE PM/ ACTIVE SYSTEM IS MRI CONDITIONAL  PT HEART RATE BTWN 120-130 BPM WHOLE TIME WHILE IN VISIT. METO WAS RECENTLY INCREASED BY PCP  DEVICE INTERROGATED IN THE [place] OFFICE: BATTERY VOLTAGE ADEQUATE-14 YRS.  64%. ALL AVAILABLE LEAD PARAMETERS WITHIN NORMAL LIMITS. NO SIGNIFICANT HIGH RATE EPISODES. NO PROGRAMMING CHANGES MADE TO DEVICE PARAMETERS. WOUND CHECK: INCISION CLEAN AND DRY WITH EDGES APPROXIMATED; STRIPS REMOVED; WOUND CARE AND RESTRICTIONS REVIEWED WITH PATIENT. NORMAL DEVICE FUNCTION. NC

## 2024-04-18 PROBLEM — I50.32 CHRONIC DIASTOLIC CONGESTIVE HEART FAILURE (HCC): Status: ACTIVE | Noted: 2024-03-27

## 2024-04-18 PROBLEM — I48.91 ATRIAL FIBRILLATION WITH RVR (HCC): Status: RESOLVED | Noted: 2024-03-23 | Resolved: 2024-04-18

## 2024-04-18 NOTE — PROGRESS NOTES
Cardiology Consultation     Diana Godoy  [id number]  4/12/1933  [facility] CARDIOLOGY [place]  [address] [id number]      1. Paroxysmal A-fib (HCC)  amiodarone (PACERONE) 400 MG tablet    amiodarone 200 mg tablet    Comprehensive metabolic panel      2. Sick sinus syndrome (HCC)        3. s/p Medtronic single chamber PPM 3/26/2024        4. Primary hypertension        5. Mixed hyperlipidemia        6. Chronic diastolic congestive heart failure (HCC)  Comprehensive metabolic panel          Discussion/Summary:  Tachybradycardia syndrome  -Patient had symptomatic conversion pauses lasting over 4 seconds  -Underwent single-chamber Medtronic PPM plantation with HIS lead in LPF region  - Device interrogation shows elevated rates  Paroxysmal atrial fibrillation with RVR (HCC)  - Anticoagulated on Xarelto 20 mg daily  - Rate control with Lopressor 150 mg twice daily  - Previously on digoxin 125 mcg daily, discontinued during hospitalization in March 2024  - She was briefly on amiodarone during hospitalization, but discontinued on discharge  - She appears to still be having paroxysmal atrial fibrillation with accelerated rates  - Will restart her on amiodarone to better control her heart rates and try and maintain sinus rhythm since she appears to feel worse when she is in atrial fibrillation  - Load with amiodarone 400 mg twice daily for 7 days and then continue with amiodarone 200 mg daily  Chronic diastolic CHF  - TTE 3/25/2024 showed EF 60%, grade 1 DD, mild LAE, estimated PA pressure 34 mmHg  - Current diuretic Rx torsemide 10 mg daily  - Previous on HCTZ, however discontinued due to hyponatremia  Hypertension  -Continue with amlodipine 5 mg daily, torsemide 10 mg daily and Lopressor 150 mg twice daily  - Blood pressure mildly elevated today  - Appears better controlled at home, continue to monitor for now  Hyperlipidemia  - Continue with atorvastatin 10 mg daily  LBBB (left bundle branch  block)    Repeat CMP in 2 weeks  Follow-up in 6 weeks.    History of Present Illness:  Diana Godoy is a 89 y.o. year old female with a past medical history of paroxysmal atrial fibrillation, hypertension, lipidemia, LBBB, tachybradycardia syndrome status post PPM, and CHF.  She is accompanied by her daughter today.  Her main complaint is difficulty sleeping at night.  She reports significant difficulty falling asleep at night.  She also reports noticing episodes of fast heart rates likely from her atrial fibrillation.  In addition, she has some increased lower extremity edema, but improved since her hospitalization.  Denies any episodes of chest pain, shortness of breath, lightheadedness/dizziness, syncope or near syncope.      Patient Active Problem List   Diagnosis    HTN (hypertension)    Paroxysmal A-fib (HCC)    Mixed hyperlipidemia    Colon polyp    Morbid obesity (HCC)    Urge incontinence of urine    Pre-diabetes    Cardiac syncope    Neck strain    Hyponatremia    LBBB (left bundle branch block)    s/p Medtronic single chamber PPM 3/26/2024    Chronic diastolic congestive heart failure (HCC)    Sick sinus syndrome (HCC)     Past Medical History:   Diagnosis Date    Afib (HCC)     Colon polyp 04/19/2018    Formatting of this note might be different from the original. Colonoscopy 4/19/18    Hyperlipidemia     Hypertension     Pre-diabetes 10/10/2022    A1c about 6.2 since 2018    s/p Medtronic single chamber PPM 3/26/2024 03/27/2024    Sick sinus syndrome (HCC) 04/08/2024    Pacer placed 3/26/24    Stroke-like symptoms: vertigo and gait imbalance 09/22/2022    Urge incontinence of urine 05/06/2016     Social History     Socioeconomic History    Marital status: Unknown     Spouse name: Not on file    Number of children: Not on file    Years of education: Not on file    Highest education level: Not on file   Occupational History    Not on file   Tobacco Use    Smoking status: Never     Passive exposure:  Past (father smoked)    Smokeless tobacco: Never   Vaping Use    Vaping status: Never Used   Substance and Sexual Activity    Alcohol use: Never    Drug use: Never    Sexual activity: Not on file   Other Topics Concern    Not on file   Social History Narrative     since  after 54 year marriage.      Three children.      Six grandchildren.  Seven great grandchildren.      Lives alone.  In her own house which [place].  2 daughters live very close.    Drives.    -has a 92-year-old sister, 82-year-old brother and a 79-year-old sister.  Lost 1 sibling. Who  at 85.     Social Determinants of Health     Financial Resource Strain: Low Risk  (10/30/2023)    Overall Financial Resource Strain (CARDIA)     Difficulty of Paying Living Expenses: Not hard at all   Food Insecurity: No Food Insecurity (3/25/2024)    Hunger Vital Sign     Worried About Running Out of Food in the Last Year: Never true     Ran Out of Food in the Last Year: Never true   Transportation Needs: No Transportation Needs (3/25/2024)    PRAPARE - Transportation     Lack of Transportation (Medical): No     Lack of Transportation (Non-Medical): No   Physical Activity: Not on file   Stress: Not on file   Social Connections: Not on file   Intimate Partner Violence: Not on file   Housing Stability: Low Risk  (3/25/2024)    Housing Stability Vital Sign     Unable to Pay for Housing in the Last Year: No     Number of Places Lived in the Last Year: 1     Unstable Housing in the Last Year: No      Family History   Problem Relation Age of Onset    Hypertension Mother     Cancer Mother     Stroke Father      Past Surgical History:   Procedure Laterality Date    CARDIAC ELECTROPHYSIOLOGY PROCEDURE N/A 3/26/2024    Procedure: Cardiac pacer implant;  Surgeon: Morgan Mccoy MD;  Location: [facility];  Service: Cardiology    HYSTERECTOMY      KNEE SURGERY Bilateral 2012       Current Outpatient Medications:     amiodarone (PACERONE) 400 MG tablet,  Take 1 tablet (400 mg total) by mouth 2 (two) times a day for 7 days, Disp: 14 tablet, Rfl: 0    [START ON 4/26/2024] amiodarone 200 mg tablet, Take 1 tablet (200 mg total) by mouth daily Do not start before April 26, 2024., Disp: 90 tablet, Rfl: 1    amLODIPine (NORVASC) 10 mg tablet, Take 0.5 tablets (5 mg total) by mouth daily, Disp: , Rfl:     atorvastatin (LIPITOR) 10 mg tablet, Take 1 tablet (10 mg total) by mouth daily, Disp: 90 tablet, Rfl: 3    cholecalciferol (VITAMIN D3) 400 units tablet, Take 1 tablet by mouth daily, Disp: , Rfl:     cyanocobalamin (VITAMIN B-12) 100 mcg tablet, Take by mouth, Disp: , Rfl:     ELDERBERRY PO, Take by mouth, Disp: , Rfl:     FISH OIL-KRILL OIL PO, Take by mouth, Disp: , Rfl:     metoprolol tartrate (LOPRESSOR) 100 mg tablet, Take 1.5 tablets (150 mg total) by mouth 2 (two) times a day, Disp: , Rfl:     Multiple Vitamin (Daily Value Multivitamin) TABS, Take by mouth, Disp: , Rfl:     rivaroxaban (XARELTO) 20 mg tablet, Take 1 tablet (20 mg total) by mouth daily, Disp: 90 tablet, Rfl: 3    torsemide (DEMADEX) 10 mg tablet, Take 1 tablet (10 mg total) by mouth daily, Disp: 30 tablet, Rfl: 5    zinc gluconate 50 mg tablet, Take 50 mg by mouth daily, Disp: , Rfl:   Allergies   Allergen Reactions    Lisinopril Hives     Other reaction(s): Angioedema    Oxycodone-Acetaminophen Rash and GI Intolerance         Labs:  Lab Results   Component Value Date    ALT 15 03/23/2024    AST 16 03/23/2024    BUN 12 03/27/2024    CALCIUM 9.3 03/27/2024    CL 92 (L) 03/27/2024    CO2 31 03/27/2024    CREATININE 0.86 03/27/2024    HDL 50 09/23/2022    HCT 43.3 03/27/2024    HGB 14.0 03/27/2024    HGBA1C 6.2 (H) 09/23/2022    MG 2.1 03/25/2024    PHOS 3.5 08/16/2023     03/27/2024    K 3.9 03/27/2024    TRIG 112 09/23/2022    WBC 6.95 03/27/2024       Imaging: Cardiac EP device report    Result Date: 4/17/2024  Narrative: MDT SINGLE PM/ ACTIVE SYSTEM IS MRI CONDITIONAL NB/RATES-CARELINK  TRANSMISSION: PRESENTING EGRAM @ 117 BPM. PT STILL HAVING FREQUENT RATES >100 BPM. PT TO SEE DR. CHISHOLM 4/19/24. NO SIGNIFICANT HIGH RATE EPISODES. ALL AVAILABLE LEAD PARAMETERS & TRENDS WITHIN NORMAL LIMITS. NC     Cardiac EP device report    Result Date: 4/11/2024  Narrative: MDT SINGLE PM/ ACTIVE SYSTEM IS MRI CONDITIONAL PT HEART RATE BTWN 120-130 BPM WHOLE TIME WHILE IN VISIT. METO WAS RECENTLY INCREASED BY PCP DEVICE INTERROGATED IN THE [place] OFFICE: BATTERY VOLTAGE ADEQUATE-14 YRS.  64%. ALL AVAILABLE LEAD PARAMETERS WITHIN NORMAL LIMITS. NO SIGNIFICANT HIGH RATE EPISODES. NO PROGRAMMING CHANGES MADE TO DEVICE PARAMETERS. WOUND CHECK: INCISION CLEAN AND DRY WITH EDGES APPROXIMATED; STRIPS REMOVED; WOUND CARE AND RESTRICTIONS REVIEWED WITH PATIENT. NORMAL DEVICE FUNCTION. NC     XR chest portable    Result Date: 3/27/2024  Narrative: XR CHEST PORTABLE INDICATION: Patient s/p Pacemaker/ICD Insertion. COMPARISON: 3/23/2024 FINDINGS: Left chest wall intracardiac device with intact lead(s). No abandoned lead(s). Clear lungs. No pneumothorax or pleural effusion. Cardiomediastinal silhouette is stable. There is some atherosclerosis of the aorta. Degenerative arthritis at the shoulders. Normal upper abdomen.     Impression: Left-sided pacemaker with single transvenous lead. No pneumothorax or infiltrates. Workstation performed: [id number]     XR chest 2 views    Result Date: 3/27/2024  Narrative: XR CHEST PA & LATERAL INDICATION: Patient s/p Pacemaker/ICD Insertion - To be done post-procedure day one at 7a.m. and read before 9 a.m. Do not lift affected arm above shoulder. COMPARISON: CXR 3/26/2024 and 3/23/2024. FINDINGS: Clear lungs. No pneumothorax or pleural effusion. Mild cardiomegaly with left subclavian pacemaker lead in right ventricular septum. Bones are unremarkable for age. Mild benign eventration of the right diaphragm.     Impression: Pacemaker well-positioned with no pneumothorax. Workstation performed:  [id number]     Cardiac ep lab eps/ablations    Result Date: 3/26/2024  Narrative: Images from the original result were not included. PPM - His lead in LPF region History and physical were reviewed Patient was examined and history was reviewed No change in patient's condition Since history and physical has been completed The pre- operative diagnosis: Symptomatic conversion pauses Underlying sick sinus syndrome Paroxysmal / early persistent atrial fibrillation Hypertension Hyperlipidemia Hyponatremia Diastolic heart failure Postoperative diagnosis: Symptomatic conversion pauses Underlying sick sinus syndrome Paroxysmal / early persistent atrial fibrillation Hypertension Hyperlipidemia Hyponatremia Diastolic heart failure Procedure: Single  chamber PPM RV lead - His lead - with LPF capture Surgeon: Morgan Mccoy Assistants -none Specimens - none Estimated blood loss- 10 ml Findings-none Complications none Anesthesia-  Anesthesia by anaesthesiologist , local lidocaine by myself Details of the device Description of procedure: The patient was seen before the procedure. The details of the procedure was explained and patient agreed to the same. Appropriate consent was signed. The patient was brought to the electrophysiologic laboratory. Proper time out was done. Sterile dressing and draping was done. Local lidocaine was infiltrated, In the infraclavicular region at the site of device implant. Initial incision was made by a sharp number 10 blade. Thereafter electrocautery and blunt dissection was used to form a prepectoral pocket. Appropriate hemostasis was taken care of. 10 mL of radiocontrast, followed by 20 mL of saline, was injected in a peripheral vein on the side of the implant. Under direct visualization, the axillary vein was  Punctured,extra-thoracic. A single guidewire was inserted, and taking all the way to the inferior vena cava to confirm that it is on the right side. We also confirmed by the left anterior oblique  view that the wire is in the right side.  A 7F sheath passed over first wire. A His sheath and lead was passed through the sheath Mapping of His bundle done Then went distally and inferiorly and left posterior fascicle (LPF) was mapped Position of the lead was confirmed in both BOURGEOIS and STANISLAV views Appropriate sensing and thresholds were noted. Lead was screwed in LPF position with non selective capture The sheath was slit and removed. Once again the lead was tested for appropriate sensing, impedance and threshold.The lead was tied down to the prepectoral fascia and muscle. The patient was paced from the LPF  for second part of procedure The pocket was washed with large amounts of antibiotic saline. Appropriate hemostasis was taken care of. The lead was connected to the generator. The generator and leads were placed inside the pocket. The generator was tied down. Thereafter the device was interrogated and proper sensing and thresholds through the device were confirmed. Avatene - for oozing Antibiotic pouch The pocket was closed in 3 separate layers with intermittent sutures. Dressing was done with Steri-Strips, Aquacell Summary of the procedure: The patient came in to the laboratory for single -chamber device placement. The device has been placed and patient tolerated the procedure well      Echo complete w/ contrast if indicated    Result Date: 3/25/2024  Narrative:   Left Ventricle: Left ventricular cavity size is normal. Wall thickness is severely increased. There is moderate to severe concentric hypertrophy. The left ventricular ejection fraction is 60%. Systolic function is normal. Wall motion is normal. Diastolic function is mildly abnormal, consistent with grade I (abnormal) relaxation.   IVS: There is abnormal septal motion consistent with left bundle branch block.   Right Ventricle: Right ventricular cavity size is normal. Systolic function is normal.   Left Atrium: The atrium is mildly dilated.   Aortic  "Valve: The aortic valve is trileaflet. The leaflets are moderately thickened. The leaflets are mildly calcified. The leaflets exhibit normal mobility. There is aortic valve sclerosis.   Mitral Valve: There is moderate annular calcification.   Tricuspid Valve: The right ventricular systolic pressure is mildly elevated. The estimated right ventricular systolic pressure is 34 mmHg.     XR chest 1 view portable    Result Date: 3/23/2024  Narrative: XR CHEST PORTABLE INDICATION: near syncope. COMPARISON: 9/22/2022 FINDINGS: Clear lungs. No pneumothorax or pleural effusion. Unchanged cardiomediastinal silhouette. Atherosclerotic aorta. Bones are unremarkable for age. Normal upper abdomen.     Impression: No acute cardiopulmonary disease. Workstation performed: [id number]       ECG: 3/26/2024: Ventricularly paced rhythm at 70 bpm    Review of Systems:  Review of Systems      Vitals:    04/19/24 0921   BP: 149/100   Pulse: 98   SpO2: 99%      Vitals:    04/19/24 0921   Weight: 117 kg (257 lb 3.2 oz)     Height: 5' 3\" (160 cm)     Physical Exam:  General appearance:  Appears stated age, alert, well appearing and in no distress  HEENT:  PERRLA, EOMI, no scleral icterus, no conjunctival pallor  NECK:  Supple, No elevated JVP, no thyromegaly, no carotid bruits  HEART: Tachycardic, regular rhythm, no significant audible murmurs  LUNGS:  Clear to auscultation bilaterally  ABDOMEN:  Soft, non-tender, positive bowel sounds, no rebound or guarding, no organomegaly   EXTREMITIES: 1-2+ bilateral lower extremity edema/lymphedema  VASCULAR:  Normal pedal pulses   SKIN: No lesions or rashes on exposed skin  NEURO:  CN II-XII intact, no focal deficits   "

## 2024-04-19 ENCOUNTER — OFFICE VISIT (OUTPATIENT)
Dept: CARDIOLOGY CLINIC | Facility: CLINIC | Age: 89
End: 2024-04-19
Payer: COMMERCIAL

## 2024-04-19 VITALS
WEIGHT: 257.2 LBS | SYSTOLIC BLOOD PRESSURE: 149 MMHG | OXYGEN SATURATION: 99 % | BODY MASS INDEX: 45.57 KG/M2 | HEIGHT: 63 IN | DIASTOLIC BLOOD PRESSURE: 100 MMHG | HEART RATE: 98 BPM

## 2024-04-19 DIAGNOSIS — I10 PRIMARY HYPERTENSION: ICD-10-CM

## 2024-04-19 DIAGNOSIS — I49.5 SICK SINUS SYNDROME (HCC): ICD-10-CM

## 2024-04-19 DIAGNOSIS — E78.2 MIXED HYPERLIPIDEMIA: ICD-10-CM

## 2024-04-19 DIAGNOSIS — Z95.0 S/P PLACEMENT OF CARDIAC PACEMAKER: ICD-10-CM

## 2024-04-19 DIAGNOSIS — I48.0 PAROXYSMAL A-FIB (HCC): Primary | ICD-10-CM

## 2024-04-19 DIAGNOSIS — I50.32 CHRONIC DIASTOLIC CONGESTIVE HEART FAILURE (HCC): ICD-10-CM

## 2024-04-19 PROCEDURE — 99214 OFFICE O/P EST MOD 30 MIN: CPT | Performed by: STUDENT IN AN ORGANIZED HEALTH CARE EDUCATION/TRAINING PROGRAM

## 2024-04-19 PROCEDURE — G2211 COMPLEX E/M VISIT ADD ON: HCPCS | Performed by: STUDENT IN AN ORGANIZED HEALTH CARE EDUCATION/TRAINING PROGRAM

## 2024-04-19 RX ORDER — AMIODARONE HYDROCHLORIDE 200 MG/1
200 TABLET ORAL DAILY
Qty: 90 TABLET | Refills: 1 | Status: SHIPPED | OUTPATIENT
Start: 2024-04-26

## 2024-04-19 RX ORDER — AMIODARONE HYDROCHLORIDE 400 MG/1
400 TABLET ORAL 2 TIMES DAILY
Qty: 14 TABLET | Refills: 0 | Status: SHIPPED | OUTPATIENT
Start: 2024-04-19 | End: 2024-04-26

## 2024-04-19 NOTE — PATIENT INSTRUCTIONS
Start the amiodarone 400 mg 1 tablet twice a day for 1 week and then changed to the 200 mg tablets once a day.  Go for the repeat blood work at the end of April/beginning of May.  Follow-up with me in about 6 weeks.

## 2024-04-29 ENCOUNTER — LAB (OUTPATIENT)
Dept: LAB | Facility: CLINIC | Age: 89
End: 2024-04-29
Payer: COMMERCIAL

## 2024-04-29 DIAGNOSIS — I10 PRIMARY HYPERTENSION: ICD-10-CM

## 2024-04-29 DIAGNOSIS — I50.32 CHRONIC DIASTOLIC CONGESTIVE HEART FAILURE (HCC): ICD-10-CM

## 2024-04-29 DIAGNOSIS — R73.03 PRE-DIABETES: ICD-10-CM

## 2024-04-29 DIAGNOSIS — I48.0 PAROXYSMAL A-FIB (HCC): ICD-10-CM

## 2024-04-29 DIAGNOSIS — E78.2 MIXED HYPERLIPIDEMIA: ICD-10-CM

## 2024-04-29 LAB
ALBUMIN SERPL BCP-MCNC: 3.8 G/DL (ref 3.5–5)
ALP SERPL-CCNC: 73 U/L (ref 34–104)
ALT SERPL W P-5'-P-CCNC: 20 U/L (ref 7–52)
ANION GAP SERPL CALCULATED.3IONS-SCNC: 9 MMOL/L (ref 4–13)
AST SERPL W P-5'-P-CCNC: 15 U/L (ref 13–39)
BASOPHILS # BLD AUTO: 0.02 THOUSANDS/ÂΜL (ref 0–0.1)
BASOPHILS NFR BLD AUTO: 0 % (ref 0–1)
BILIRUB SERPL-MCNC: 0.92 MG/DL (ref 0.2–1)
BUN SERPL-MCNC: 14 MG/DL (ref 5–25)
CALCIUM SERPL-MCNC: 9.3 MG/DL (ref 8.4–10.2)
CHLORIDE SERPL-SCNC: 98 MMOL/L (ref 96–108)
CHOLEST SERPL-MCNC: 139 MG/DL
CO2 SERPL-SCNC: 33 MMOL/L (ref 21–32)
CREAT SERPL-MCNC: 0.96 MG/DL (ref 0.6–1.3)
DIGOXIN SERPL-MCNC: <0.3 NG/ML (ref 0.8–2)
EOSINOPHIL # BLD AUTO: 0.15 THOUSAND/ÂΜL (ref 0–0.61)
EOSINOPHIL NFR BLD AUTO: 2 % (ref 0–6)
ERYTHROCYTE [DISTWIDTH] IN BLOOD BY AUTOMATED COUNT: 13.3 % (ref 11.6–15.1)
EST. AVERAGE GLUCOSE BLD GHB EST-MCNC: 146 MG/DL
GFR SERPL CREATININE-BSD FRML MDRD: 51 ML/MIN/1.73SQ M
GLUCOSE P FAST SERPL-MCNC: 111 MG/DL (ref 65–99)
HBA1C MFR BLD: 6.7 %
HCT VFR BLD AUTO: 40.3 % (ref 34.8–46.1)
HDLC SERPL-MCNC: 38 MG/DL
HGB BLD-MCNC: 12.7 G/DL (ref 11.5–15.4)
IMM GRANULOCYTES # BLD AUTO: 0.04 THOUSAND/UL (ref 0–0.2)
IMM GRANULOCYTES NFR BLD AUTO: 1 % (ref 0–2)
LDLC SERPL CALC-MCNC: 75 MG/DL (ref 0–100)
LYMPHOCYTES # BLD AUTO: 1.4 THOUSANDS/ÂΜL (ref 0.6–4.47)
LYMPHOCYTES NFR BLD AUTO: 19 % (ref 14–44)
MCH RBC QN AUTO: 28.8 PG (ref 26.8–34.3)
MCHC RBC AUTO-ENTMCNC: 31.5 G/DL (ref 31.4–37.4)
MCV RBC AUTO: 91 FL (ref 82–98)
MONOCYTES # BLD AUTO: 0.49 THOUSAND/ÂΜL (ref 0.17–1.22)
MONOCYTES NFR BLD AUTO: 7 % (ref 4–12)
NEUTROPHILS # BLD AUTO: 5.39 THOUSANDS/ÂΜL (ref 1.85–7.62)
NEUTS SEG NFR BLD AUTO: 71 % (ref 43–75)
NONHDLC SERPL-MCNC: 101 MG/DL
NRBC BLD AUTO-RTO: 0 /100 WBCS
PLATELET # BLD AUTO: 325 THOUSANDS/UL (ref 149–390)
PMV BLD AUTO: 10.3 FL (ref 8.9–12.7)
POTASSIUM SERPL-SCNC: 4 MMOL/L (ref 3.5–5.3)
PROT SERPL-MCNC: 6.6 G/DL (ref 6.4–8.4)
RBC # BLD AUTO: 4.41 MILLION/UL (ref 3.81–5.12)
SODIUM SERPL-SCNC: 140 MMOL/L (ref 135–147)
TRIGL SERPL-MCNC: 131 MG/DL
WBC # BLD AUTO: 7.49 THOUSAND/UL (ref 4.31–10.16)

## 2024-04-29 PROCEDURE — 36415 COLL VENOUS BLD VENIPUNCTURE: CPT

## 2024-04-29 PROCEDURE — 83036 HEMOGLOBIN GLYCOSYLATED A1C: CPT

## 2024-04-29 PROCEDURE — 85025 COMPLETE CBC W/AUTO DIFF WBC: CPT

## 2024-04-29 PROCEDURE — 80061 LIPID PANEL: CPT

## 2024-04-29 PROCEDURE — 80053 COMPREHEN METABOLIC PANEL: CPT

## 2024-04-29 PROCEDURE — 80162 ASSAY OF DIGOXIN TOTAL: CPT

## 2024-04-30 NOTE — RESULT ENCOUNTER NOTE
A1c is 6.7.  Diabetes well-controlled.  Cholesterol is excellent.  Chemistry okay.  Kidney function stable.  Blood count normal.

## 2024-05-02 DIAGNOSIS — I50.22 CHRONIC SYSTOLIC CONGESTIVE HEART FAILURE (HCC): ICD-10-CM

## 2024-05-02 NOTE — TELEPHONE ENCOUNTER
Medication: Torsemide    Dose/Frequency: 10 mg daily    Quantity: 30 tablets    Pharmacy: [facility] [address]    Office:   [x] PCP/Provider - Ezio Mckenna  [] Speciality/Provider -     Does the patient have enough for 3 days?   [x] Yes   [] No - Send as HP to POD

## 2024-05-03 RX ORDER — TORSEMIDE 10 MG/1
10 TABLET ORAL DAILY
Qty: 30 TABLET | Refills: 5 | Status: SHIPPED | OUTPATIENT
Start: 2024-05-03

## 2024-05-05 ENCOUNTER — RA CDI HCC (OUTPATIENT)
Dept: OTHER | Facility: HOSPITAL | Age: 89
End: 2024-05-05

## 2024-05-13 ENCOUNTER — OFFICE VISIT (OUTPATIENT)
Dept: FAMILY MEDICINE CLINIC | Facility: CLINIC | Age: 89
End: 2024-05-13
Payer: COMMERCIAL

## 2024-05-13 VITALS
OXYGEN SATURATION: 96 % | SYSTOLIC BLOOD PRESSURE: 135 MMHG | HEART RATE: 94 BPM | HEIGHT: 63 IN | WEIGHT: 257 LBS | BODY MASS INDEX: 45.54 KG/M2 | TEMPERATURE: 97.6 F | DIASTOLIC BLOOD PRESSURE: 90 MMHG

## 2024-05-13 DIAGNOSIS — Z95.0 S/P PLACEMENT OF CARDIAC PACEMAKER: ICD-10-CM

## 2024-05-13 DIAGNOSIS — I50.32 CHRONIC DIASTOLIC CONGESTIVE HEART FAILURE (HCC): ICD-10-CM

## 2024-05-13 DIAGNOSIS — I48.0 PAROXYSMAL A-FIB (HCC): ICD-10-CM

## 2024-05-13 DIAGNOSIS — I49.5 SICK SINUS SYNDROME (HCC): ICD-10-CM

## 2024-05-13 DIAGNOSIS — E78.2 MIXED HYPERLIPIDEMIA: ICD-10-CM

## 2024-05-13 DIAGNOSIS — N18.31 STAGE 3A CHRONIC KIDNEY DISEASE (HCC): ICD-10-CM

## 2024-05-13 DIAGNOSIS — I10 PRIMARY HYPERTENSION: Primary | ICD-10-CM

## 2024-05-13 DIAGNOSIS — I50.22 CHRONIC SYSTOLIC CONGESTIVE HEART FAILURE (HCC): ICD-10-CM

## 2024-05-13 PROBLEM — R55 CARDIAC SYNCOPE: Status: RESOLVED | Noted: 2023-08-14 | Resolved: 2024-05-13

## 2024-05-13 PROCEDURE — G2211 COMPLEX E/M VISIT ADD ON: HCPCS | Performed by: FAMILY MEDICINE

## 2024-05-13 PROCEDURE — 99214 OFFICE O/P EST MOD 30 MIN: CPT | Performed by: FAMILY MEDICINE

## 2024-05-13 RX ORDER — METOPROLOL TARTRATE 100 MG/1
150 TABLET ORAL 2 TIMES DAILY
Qty: 270 TABLET | Refills: 3 | Status: SHIPPED | OUTPATIENT
Start: 2024-05-13

## 2024-05-13 RX ORDER — TORSEMIDE 10 MG/1
10 TABLET ORAL DAILY
Qty: 90 TABLET | Refills: 3 | Status: SHIPPED | OUTPATIENT
Start: 2024-05-13

## 2024-05-13 NOTE — PROGRESS NOTES
Chief Complaint   Patient presents with    Follow-up     Medication check, pt also had blood work done want to discuss results.        HPI   Here for follow-up of hypertension and atrial fibrillation.  At last visit, metoprolol increased to 150 twice daily.  Amlodipine decreased to 5.  Started on torsemide because of extra fluid.  Was seen by cardiology who started her on amiodarone 400 mg twice daily for 7 days and then 200 mg daily.    Past Medical History:   Diagnosis Date    Afib (HCC)     Colon polyp 2018    Formatting of this note might be different from the original. Colonoscopy 18    Hyperlipidemia     Hypertension     Pre-diabetes 10/10/2022    A1c about 6.2 since     s/p Medtronic single chamber PPM 3/26/2024 2024    Sick sinus syndrome (HCC) 2024    Pacer placed 3/26/24    Stroke-like symptoms: vertigo and gait imbalance 2022    Urge incontinence of urine 2016        Past Surgical History:   Procedure Laterality Date    CARDIAC ELECTROPHYSIOLOGY PROCEDURE N/A 3/26/2024    Procedure: Cardiac pacer implant;  Surgeon: Morgan Mccoy MD;  Location: [facility];  Service: Cardiology    HYSTERECTOMY      KNEE SURGERY Bilateral        Social History     Tobacco Use    Smoking status: Never     Passive exposure: Past (father smoked)    Smokeless tobacco: Never   Substance Use Topics    Alcohol use: Never       Social History     Social History Narrative     since  after 54 year marriage.      Three children.      Six grandchildren.  Seven great grandchildren.      Lives alone.  In her own house which is a ranch.  2 daughters live very close.    Drives.    -has a 92-year-old sister, 82-year-old brother and a 79-year-old sister.  Lost 1 sibling. Who  at 85.        The following portions of the patient's history were reviewed and updated as appropriate: allergies, current medications, past family history, past medical history, past social history,  "past surgical history, and problem list.      Review of Systems       /90 (BP Location: Left arm, Patient Position: Sitting, Cuff Size: Large)   Pulse 94   Temp 97.6 °F (36.4 °C) (Temporal)   Ht 5' 3\" (1.6 m)   Wt 117 kg (257 lb)   SpO2 96%   BMI 45.53 kg/m²      Physical Exam   Weight down 4 pounds.  Breathing comfortably.  Lungs are clear.  Heart rate about 72.  Heart sounds are distant.  Edema persists but is improved from last visit.            Current Outpatient Medications:     amiodarone 200 mg tablet, Take 1 tablet (200 mg total) by mouth daily Do not start before April 26, 2024., Disp: 90 tablet, Rfl: 1    amLODIPine (NORVASC) 10 mg tablet, Take 0.5 tablets (5 mg total) by mouth daily, Disp: , Rfl:     atorvastatin (LIPITOR) 10 mg tablet, Take 1 tablet (10 mg total) by mouth daily, Disp: 90 tablet, Rfl: 3    cholecalciferol (VITAMIN D3) 400 units tablet, Take 1 tablet by mouth daily, Disp: , Rfl:     cyanocobalamin (VITAMIN B-12) 100 mcg tablet, Take by mouth, Disp: , Rfl:     ELDERBERRY PO, Take by mouth, Disp: , Rfl:     FISH OIL-KRILL OIL PO, Take by mouth, Disp: , Rfl:     metoprolol tartrate (LOPRESSOR) 100 mg tablet, Take 1.5 tablets (150 mg total) by mouth 2 (two) times a day, Disp: , Rfl:     Multiple Vitamin (Daily Value Multivitamin) TABS, Take by mouth, Disp: , Rfl:     rivaroxaban (XARELTO) 20 mg tablet, Take 1 tablet (20 mg total) by mouth daily, Disp: 90 tablet, Rfl: 3    torsemide (DEMADEX) 10 mg tablet, Take 1 tablet (10 mg total) by mouth daily, Disp: 30 tablet, Rfl: 5    zinc gluconate 50 mg tablet, Take 50 mg by mouth daily, Disp: , Rfl:     amiodarone (PACERONE) 400 MG tablet, Take 1 tablet (400 mg total) by mouth 2 (two) times a day for 7 days, Disp: 14 tablet, Rfl: 0     No problem-specific Assessment & Plan notes found for this encounter.       Diagnoses and all orders for this visit:    Primary hypertension    Paroxysmal A-fib (HCC)    Mixed hyperlipidemia    Sick sinus " syndrome (HCC)    s/p Medtronic single chamber PPM 3/26/2024    Chronic diastolic congestive heart failure (HCC)        Patient Instructions   Blood pressure is okay.  Ankle edema is improved.  Continue present medications.  Recheck 4 months.

## 2024-05-13 NOTE — PATIENT INSTRUCTIONS
Blood pressure is okay.  Ankle edema is improved.  Continue present medications.  Recheck 4 months.

## 2024-05-21 DIAGNOSIS — I10 PRIMARY HYPERTENSION: ICD-10-CM

## 2024-05-22 RX ORDER — HYDROCHLOROTHIAZIDE 25 MG/1
25 TABLET ORAL DAILY
Qty: 90 TABLET | Refills: 0 | OUTPATIENT
Start: 2024-05-22

## 2024-05-27 NOTE — PROGRESS NOTES
Cardiology Consultation     Diana Godoy  [id number]  4/12/1933  [facility] CARDIOLOGY [place]  [address] [facility] [id number]      1. Paroxysmal A-fib (HCC)  POCT ECG      2. Sick sinus syndrome (HCC)        3. Chronic diastolic congestive heart failure (HCC)        4. s/p Medtronic single chamber PPM 3/26/2024        5. Primary hypertension        6. Mixed hyperlipidemia              Discussion/Summary:  Tachybradycardia syndrome  -Patient had symptomatic conversion pauses lasting over 4 seconds  -Underwent single-chamber Medtronic PPM plantation with HIS lead in LPF region  - Device interrogation shows elevated rates on 4/17/2024 prior to starting amiodarone  Paroxysmal atrial fibrillation with RVR (HCC)  - Anticoagulated on Xarelto 20 mg daily  - Rate control with Lopressor 150 mg twice daily  - Previously on digoxin 125 mcg daily, discontinued during hospitalization in March 2024  - Continue with amiodarone 200 mg daily  Chronic diastolic CHF  - TTE 3/25/2024 showed EF 60%, grade 1 DD, mild LAE, estimated PA pressure 34 mmHg  - Current diuretic Rx torsemide 10 mg daily-> instructed her she can take an extra dose of torsemide in the afternoon if her lower extremity edema worsens or weight increases  - Previous on HCTZ, however discontinued due to hyponatremia  Hypertension  -Continue with amlodipine 10 mg daily, torsemide 10 mg daily and Lopressor 150 mg twice daily  -Blood pressure is mildly elevated today, will continue to monitor  Hyperlipidemia  - Continue with atorvastatin 10 mg daily  - Lipid profile 4/29/2024 showed total cholesterol 139, triglycerides 131, HDL 38, LDL 75  LBBB (left bundle branch block)    Follow-up in 3 months.     History of Present Illness:  Diana Godoy is a 89 y.o. year old female with a past medical history of paroxysmal atrial fibrillation, hypertension, lipidemia, LBBB, tachybradycardia syndrome status post PPM, and CHF.    4/19/2024: She is accompanied  by her daughter today.  Her main complaint is difficulty sleeping at night.  She reports significant difficulty falling asleep at night.  She also reports noticing episodes of fast heart rates likely from her atrial fibrillation.  In addition, she has some increased lower extremity edema, but improved since her hospitalization.  Denies any episodes of chest pain, shortness of breath, lightheadedness/dizziness, syncope or near syncope.    Interval history:  She is companied by her daughters today.  Overall she is doing okay.  She has some chronic lower extremity edema which has been relatively stable.  Her daughters are concerned she has been a little bit unsteady on her feet and has had some falls at home.  She is not interested in doing physical therapy.    Patient Active Problem List   Diagnosis    HTN (hypertension)    Paroxysmal A-fib (HCC)    Mixed hyperlipidemia    Colon polyp    Morbid obesity (HCC)    Urge incontinence of urine    Pre-diabetes    Neck strain    Hyponatremia    LBBB (left bundle branch block)    s/p Medtronic single chamber PPM 3/26/2024    Chronic diastolic congestive heart failure (HCC)    Sick sinus syndrome (HCC)    Stage 3a chronic kidney disease (HCC)     Past Medical History:   Diagnosis Date    Afib (HCC)     Colon polyp 04/19/2018    Formatting of this note might be different from the original. Colonoscopy 4/19/18    Hyperlipidemia     Hypertension     Pre-diabetes 10/10/2022    A1c about 6.2 since 2018    s/p Medtronic single chamber PPM 3/26/2024 03/27/2024    Sick sinus syndrome (HCC) 04/08/2024    Pacer placed 3/26/24    Stroke-like symptoms: vertigo and gait imbalance 09/22/2022    Urge incontinence of urine 05/06/2016     Social History     Socioeconomic History    Marital status: Unknown     Spouse name: Not on file    Number of children: Not on file    Years of education: Not on file    Highest education level: Not on file   Occupational History    Not on file   Tobacco Use     Smoking status: Never     Passive exposure: Past (father smoked)    Smokeless tobacco: Never   Vaping Use    Vaping status: Never Used   Substance and Sexual Activity    Alcohol use: Never    Drug use: Never    Sexual activity: Not on file   Other Topics Concern    Not on file   Social History Narrative     since  after 54 year marriage.      Three children.      Six grandchildren.  Seven great grandchildren.      Lives alone.  In her own [place] which [place].  2 daughters live very close.    Drives.    -has a 92-year-old sister, 82-year-old brother and a 79-year-old sister.  Lost 1 sibling. Who  at 85.     Social Determinants of Health     Financial Resource Strain: Low Risk  (10/30/2023)    Overall Financial Resource Strain (CARDIA)     Difficulty of Paying Living Expenses: Not hard at all   Food Insecurity: No Food Insecurity (3/25/2024)    Hunger Vital Sign     Worried About Running Out of Food in the Last Year: Never true     Ran Out of Food in the Last Year: Never true   Transportation Needs: No Transportation Needs (3/25/2024)    PRAPARE - Transportation     Lack of Transportation (Medical): No     Lack of Transportation (Non-Medical): No   Physical Activity: Not on file   Stress: Not on file   Social Connections: Not on file   Intimate Partner Violence: Not on file   Housing Stability: Low Risk  (3/25/2024)    Housing Stability Vital Sign     Unable to Pay for Housing in the Last Year: No     Number of Places Lived in the Last Year: 1     Unstable Housing in the Last Year: No      Family History   Problem Relation Age of Onset    Hypertension Mother     Cancer Mother     Stroke Father      Past Surgical History:   Procedure Laterality Date    CARDIAC ELECTROPHYSIOLOGY PROCEDURE N/A 3/26/2024    Procedure: Cardiac pacer implant;  Surgeon: Morgan Mccoy MD;  Location: [facility];  Service: Cardiology    HYSTERECTOMY      KNEE SURGERY Bilateral 2012       Current Outpatient  Medications:     amiodarone 200 mg tablet, Take 1 tablet (200 mg total) by mouth daily Do not start before April 26, 2024., Disp: 90 tablet, Rfl: 1    amLODIPine (NORVASC) 10 mg tablet, Take 0.5 tablets (5 mg total) by mouth daily, Disp: , Rfl:     atorvastatin (LIPITOR) 10 mg tablet, Take 1 tablet (10 mg total) by mouth daily, Disp: 90 tablet, Rfl: 3    cholecalciferol (VITAMIN D3) 400 units tablet, Take 1 tablet by mouth daily, Disp: , Rfl:     cyanocobalamin (VITAMIN B-12) 100 mcg tablet, Take by mouth, Disp: , Rfl:     ECHINACEA-MAJANO SEAL COMPLEX PO, Take 1,400 mg by mouth in the morning, Disp: , Rfl:     ELDERBERRY PO, Take by mouth, Disp: , Rfl:     FISH OIL-KRILL OIL PO, Take by mouth, Disp: , Rfl:     Garlic 580 MG CAPS, Take 580 mg by mouth in the morning, Disp: , Rfl:     magnesium (MAGTAB) 84 MG (7MEQ) TBCR, Take 250 mg by mouth daily, Disp: , Rfl:     metoprolol tartrate (LOPRESSOR) 100 mg tablet, Take 1.5 tablets (150 mg total) by mouth 2 (two) times a day, Disp: 270 tablet, Rfl: 3    Multiple Vitamin (Daily Value Multivitamin) TABS, Take by mouth, Disp: , Rfl:     rivaroxaban (XARELTO) 20 mg tablet, Take 1 tablet (20 mg total) by mouth daily, Disp: 90 tablet, Rfl: 3    Saccharomyces boulardii (Probiotic) 250 MG CAPS, Take by mouth 2 (two) times a day, Disp: , Rfl:     torsemide (DEMADEX) 10 mg tablet, Take 1 tablet (10 mg total) by mouth daily, Disp: 90 tablet, Rfl: 3    Turmeric (QC Tumeric Complex) 500 MG CAPS, Take 500 mg by mouth in the morning, Disp: , Rfl:     zinc gluconate 50 mg tablet, Take 50 mg by mouth daily, Disp: , Rfl:     amiodarone (PACERONE) 400 MG tablet, Take 1 tablet (400 mg total) by mouth 2 (two) times a day for 7 days, Disp: 14 tablet, Rfl: 0  Allergies   Allergen Reactions    Lisinopril Hives     Other reaction(s): Angioedema    Oxycodone-Acetaminophen Rash and GI Intolerance         Labs:  Lab Results   Component Value Date    ALT 20 04/29/2024    AST 15 04/29/2024    BUN 14  04/29/2024    CALCIUM 9.3 04/29/2024    CL 98 04/29/2024    CO2 33 (H) 04/29/2024    CREATININE 0.96 04/29/2024    HDL 38 (L) 04/29/2024    HCT 40.3 04/29/2024    HGB 12.7 04/29/2024    HGBA1C 6.7 (H) 04/29/2024    MG 2.1 03/25/2024    PHOS 3.5 08/16/2023     04/29/2024    K 4.0 04/29/2024    TRIG 131 04/29/2024    WBC 7.49 04/29/2024       Imaging: Cardiac EP device report    Result Date: 4/17/2024  Narrative: MDT SINGLE PM/ ACTIVE SYSTEM IS MRI CONDITIONAL NB/RATES-CARELINK TRANSMISSION: PRESENTING EGRAM @ 117 BPM. PT STILL HAVING FREQUENT RATES >100 BPM. PT TO SEE DR. CHISHOLM 4/19/24. NO SIGNIFICANT HIGH RATE EPISODES. ALL AVAILABLE LEAD PARAMETERS & TRENDS WITHIN NORMAL LIMITS. NC     Cardiac EP device report    Result Date: 4/11/2024  Narrative: MDT SINGLE PM/ ACTIVE SYSTEM IS MRI CONDITIONAL PT HEART RATE BTWN 120-130 BPM WHOLE TIME WHILE IN VISIT. METO WAS RECENTLY INCREASED BY PCP DEVICE INTERROGATED IN THE [place] OFFICE: BATTERY VOLTAGE ADEQUATE-14 YRS.  64%. ALL AVAILABLE LEAD PARAMETERS WITHIN NORMAL LIMITS. NO SIGNIFICANT HIGH RATE EPISODES. NO PROGRAMMING CHANGES MADE TO DEVICE PARAMETERS. WOUND CHECK: INCISION CLEAN AND DRY WITH EDGES APPROXIMATED; STRIPS REMOVED; WOUND CARE AND RESTRICTIONS REVIEWED WITH PATIENT. NORMAL DEVICE FUNCTION. NC     XR chest portable    Result Date: 3/27/2024  Narrative: XR CHEST PORTABLE INDICATION: Patient s/p Pacemaker/ICD Insertion. COMPARISON: 3/23/2024 FINDINGS: Left chest wall intracardiac device with intact lead(s). No abandoned lead(s). Clear lungs. No pneumothorax or pleural effusion. Cardiomediastinal silhouette is stable. There is some atherosclerosis of the aorta. Degenerative arthritis at the shoulders. Normal upper abdomen.     Impression: Left-sided pacemaker with single transvenous lead. No pneumothorax or infiltrates. Workstation performed: [id number]     XR chest 2 views    Result Date: 3/27/2024  Narrative: XR CHEST PA & LATERAL INDICATION: Patient  s/p Pacemaker/ICD Insertion - To be done post-procedure day one at 7a.m. and read before 9 a.m. Do not lift affected arm above shoulder. COMPARISON: CXR 3/26/2024 and 3/23/2024. FINDINGS: Clear lungs. No pneumothorax or pleural effusion. Mild cardiomegaly with left subclavian pacemaker lead in right ventricular septum. Bones are unremarkable for age. Mild benign eventration of the right diaphragm.     Impression: Pacemaker well-positioned with no pneumothorax. Workstation performed: [id number]     Cardiac ep lab eps/ablations    Result Date: 3/26/2024  Narrative: Images from the original result were not included. PPM - His lead in LPF region History and physical were reviewed Patient was examined and history was reviewed No change in patient's condition Since history and physical has been completed The pre- operative diagnosis: Symptomatic conversion pauses Underlying sick sinus syndrome Paroxysmal / early persistent atrial fibrillation Hypertension Hyperlipidemia Hyponatremia Diastolic heart failure Postoperative diagnosis: Symptomatic conversion pauses Underlying sick sinus syndrome Paroxysmal / early persistent atrial fibrillation Hypertension Hyperlipidemia Hyponatremia Diastolic heart failure Procedure: Single  chamber PPM RV lead - His lead - with LPF capture Surgeon: Morgan Mccoy Assistants -none Specimens - none Estimated blood loss- 10 ml Findings-none Complications none Anesthesia-  Anesthesia by anaesthesiologist , local lidocaine by myself Details of the device Description of procedure: The patient was seen before the procedure. The details of the procedure was explained and patient agreed to the same. Appropriate consent was signed. The patient was brought to the electrophysiologic laboratory. Proper time out was done. Sterile dressing and draping was done. Local lidocaine was infiltrated, In the infraclavicular region at the site of device implant. Initial incision was made by a sharp number 10 blade.  Thereafter electrocautery and blunt dissection was used to form a prepectoral pocket. Appropriate hemostasis was taken care of. 10 mL of radiocontrast, followed by 20 mL of saline, was injected in a peripheral vein on the side of the implant. Under direct visualization, the axillary vein was  Punctured,extra-thoracic. A single guidewire was inserted, and taking all the way to the inferior vena cava to confirm that it is on the right side. We also confirmed by the left anterior oblique view that the wire is in the right side.  A 7F sheath passed over first wire. A His sheath and lead was passed through the sheath Mapping of His bundle done Then went distally and inferiorly and left posterior fascicle (LPF) was mapped Position of the lead was confirmed in both [place] and [place] views Appropriate sensing and thresholds were noted. Lead was screwed in LPF position with non selective capture The sheath was slit and removed. Once again the lead was tested for appropriate sensing, impedance and threshold.The lead was tied down to the prepectoral fascia and muscle. The patient was paced from the LPF  for second part of procedure The pocket was washed with large amounts of antibiotic saline. Appropriate hemostasis was taken care of. The lead was connected to the generator. The generator and leads were placed inside the pocket. The generator was tied down. Thereafter the device was interrogated and proper sensing and thresholds through the device were confirmed. Avatene - for oozing Antibiotic pouch The pocket was closed in 3 separate layers with intermittent sutures. Dressing was done with Steri-Strips, Aquacell Summary of the procedure: The patient came in to the laboratory for single -chamber device placement. The device has been placed and patient tolerated the procedure well      Echo complete w/ contrast if indicated    Result Date: 3/25/2024  Narrative:   Left Ventricle: Left ventricular cavity size is normal. Wall thickness  is severely increased. There is moderate to severe concentric hypertrophy. The left ventricular ejection fraction is 60%. Systolic function is normal. Wall motion is normal. Diastolic function is mildly abnormal, consistent with grade I (abnormal) relaxation.   IVS: There is abnormal septal motion consistent with left bundle branch block.   Right Ventricle: Right ventricular cavity size is normal. Systolic function is normal.   Left Atrium: The atrium is mildly dilated.   Aortic Valve: The aortic valve is trileaflet. The leaflets are moderately thickened. The leaflets are mildly calcified. The leaflets exhibit normal mobility. There is aortic valve sclerosis.   Mitral Valve: There is moderate annular calcification.   Tricuspid Valve: The right ventricular systolic pressure is mildly elevated. The estimated right ventricular systolic pressure is 34 mmHg.     XR chest 1 view portable    Result Date: 3/23/2024  Narrative: XR CHEST PORTABLE INDICATION: near syncope. COMPARISON: 2022 FINDINGS: Clear lungs. No pneumothorax or pleural effusion. Unchanged cardiomediastinal silhouette. Atherosclerotic aorta. Bones are unremarkable for age. Normal upper abdomen.     Impression: No acute cardiopulmonary disease. Workstation performed: [id number]       EC2024: V paced rhythm at 86 bpm    Review of Systems:  Review of Systems   Constitutional:  Negative for chills, diaphoresis, fatigue and fever.   HENT:  Negative for congestion.    Eyes:  Negative for photophobia and visual disturbance.   Respiratory:  Negative for chest tightness and shortness of breath.    Cardiovascular:  Positive for leg swelling. Negative for chest pain and palpitations.   Gastrointestinal:  Negative for abdominal distention, abdominal pain, diarrhea, nausea and vomiting.   Genitourinary:  Negative for difficulty urinating and dysuria.   Musculoskeletal:  Negative for arthralgias, gait problem and joint swelling.   Skin:  Negative for color  change, pallor and rash.   Neurological:  Negative for dizziness, syncope, numbness and headaches.   Psychiatric/Behavioral:  Negative for agitation, behavioral problems and confusion.          Vitals:    05/29/24 0830   BP: 142/90   Pulse: 86        Vitals:    05/29/24 0830   Weight: 114 kg (250 lb 12.8 oz)             Physical Exam:  General appearance:  Appears stated age, alert, well appearing and in no distress  HEENT:  PERRLA, EOMI, no scleral icterus, no conjunctival pallor  NECK:  Supple, No elevated JVP, no thyromegaly, no carotid bruits  HEART: Tachycardic, regular rhythm, no significant audible murmurs  LUNGS:  Clear to auscultation bilaterally  ABDOMEN:  Soft, non-tender, positive bowel sounds, no rebound or guarding, no organomegaly   EXTREMITIES: 1-2+ bilateral lower extremity edema/lymphedema  VASCULAR:  Normal pedal pulses   SKIN: No lesions or rashes on exposed skin  NEURO:  CN II-XII intact, no focal deficits

## 2024-05-29 ENCOUNTER — OFFICE VISIT (OUTPATIENT)
Dept: CARDIOLOGY CLINIC | Facility: CLINIC | Age: 89
End: 2024-05-29
Payer: COMMERCIAL

## 2024-05-29 VITALS
HEART RATE: 86 BPM | BODY MASS INDEX: 44.43 KG/M2 | DIASTOLIC BLOOD PRESSURE: 90 MMHG | WEIGHT: 250.8 LBS | SYSTOLIC BLOOD PRESSURE: 142 MMHG

## 2024-05-29 DIAGNOSIS — I49.5 SICK SINUS SYNDROME (HCC): ICD-10-CM

## 2024-05-29 DIAGNOSIS — Z95.0 S/P PLACEMENT OF CARDIAC PACEMAKER: ICD-10-CM

## 2024-05-29 DIAGNOSIS — I10 PRIMARY HYPERTENSION: ICD-10-CM

## 2024-05-29 DIAGNOSIS — I48.0 PAROXYSMAL A-FIB (HCC): Primary | ICD-10-CM

## 2024-05-29 DIAGNOSIS — I50.32 CHRONIC DIASTOLIC CONGESTIVE HEART FAILURE (HCC): ICD-10-CM

## 2024-05-29 DIAGNOSIS — E78.2 MIXED HYPERLIPIDEMIA: ICD-10-CM

## 2024-05-29 PROCEDURE — 93000 ELECTROCARDIOGRAM COMPLETE: CPT | Performed by: STUDENT IN AN ORGANIZED HEALTH CARE EDUCATION/TRAINING PROGRAM

## 2024-05-29 PROCEDURE — 99214 OFFICE O/P EST MOD 30 MIN: CPT | Performed by: STUDENT IN AN ORGANIZED HEALTH CARE EDUCATION/TRAINING PROGRAM

## 2024-05-29 RX ORDER — NICOTINE 14MG/24HR
PATCH, TRANSDERMAL 24 HOURS TRANSDERMAL 2 TIMES DAILY
COMMUNITY

## 2024-05-29 RX ORDER — MAGNESIUM L-LACTATE 84 MG
250 TABLET, EXTENDED RELEASE ORAL DAILY
COMMUNITY

## 2024-05-29 RX ORDER — VIT C/B6/B5/MAGNESIUM/HERB 173 50-5-6-5MG
500 CAPSULE ORAL DAILY
COMMUNITY

## 2024-06-10 NOTE — CASE MANAGEMENT
Case Management Assessment & Discharge Planning Note    Patient name Abhay Washington  Location [address] [place] [address] [facility] [address] [place] [phone] MRN [id number]  : 1933 Date 8/15/2023       Current Admission Date: 2023  Current Admission Diagnosis:Syncope, vasovagal   Patient Active Problem List    Diagnosis Date Noted   • Syncope, vasovagal 2023   • Neck strain 2023   • Pre-diabetes 10/10/2022   • Mixed hyperlipidemia 2022   • HTN (hypertension) 2022   • Paroxysmal A-fib ([address]) 2022   • Colon polyp 2018   • Morbid obesity ([address]) 2017   • Urge incontinence of urine 2016      LOS (days): 0  Geometric Mean LOS (GMLOS) (days):   Days to GMLOS:     OBJECTIVE:     Current admission status: Observation    Preferred Pharmacy:   [facility] DR AMARI AGUILERA [address] [place] [address]  Phone: [phone] Fax: [phone]    Primary Care Provider: Bunny Kapoor MD    Primary Insurance: [facility]  Secondary Insurance:     ASSESSMENT:  [place] Proxies    There are no active Health Care Proxies on file. Patient Information  Admitted from[de-identified] Home  Mental Status: Alert  During Assessment patient was accompanied by: Not accompanied during assessment  Assessment information provided by[de-identified] Patient  Primary Caregiver: Self  Support Systems: Children, Family members  Home entry access options.  Select all that apply.: Stairs  Number of steps to enter home.: 1  Type of Current Residence: [address] [place] home  Upon entering residence, is there a bedroom on the main floor (no further steps)?: Yes  Upon entering residence, is there a bathroom on the main floor (no further steps)?: Yes  In the last 12 months, was there a time when you were not able to pay the mortgage or rent on time?: No  In the last 12 months, how many places have you lived?: 1  In the last 12 months, was there a time when you did not have a steady place to sleep or slept in a shelter (including now)?: No  Homeless/housing insecurity resource given?: N/A  Living Arrangements: Lives Alone  Is patient a ?: No    Activities of Daily Living Prior to Admission  Functional Status: Independent  Completes ADLs independently?: Yes  Ambulates independently?: Yes  Does patient use assisted devices?: No         Patient Information Continued  Income Source: Pension/snf  Within the past 12 months, you worried that your food would run out before you got the money to buy more.: Never true  Within the past 12 months, the food you bought just didn't last and you didn't have money to get more.: Never true  Food insecurity resource given?: N/A  Does patient receive dialysis treatments?: No         Means of Transportation  Means of Transport to Appts[de-identified] Family transport  In the past 12 months, has lack of transportation kept you from medical appointments or from getting medications?: No  In the past 12 months, has lack of transportation kept you from meetings, work, or from getting things needed for daily living?: No  Was application for public transport provided?: N/A        DISCHARGE DETAILS:    Discharge planning discussed with[de-identified] Patient  Freedom of Choice: Yes  Comments - Freedom of Choice: Patient would like to go home  CM contacted family/caregiver?: No- see comments (patient alert and oriented)  Were Treatment Team discharge recommendations reviewed with patient/caregiver?: Yes  Did patient/caregiver verbalize understanding of patient care needs?: Yes  Were patient/caregiver advised of the risks associated with not following Treatment Team discharge recommendations?: Yes    Contacts  Patient Contacts: Melania Savage (Child)   [phone]  Relationship to Patient[de-identified] Family  Contact Method: Phone  Reason/Outcome: Emergency Contact, Continuity of Care, Discharge Planning      Treatment Team Recommendation: Home  Additional Comments: Cm spoke with patient.  She is independent at home and lives by herself. She states family is always at her house and she lives on a property with her family. No CM needs at this time.  CM to follow through DC Do Your Fingernails Or Tonails Have Holes Or Pits?: Yes Have You Had Pain In Your Heel?: No Negative Screening Text: A score of less than 3 is considered a negative PEST score. Detail Level: Simple Positive Screening Text: A score of 3 or greater is considered a positive PEST score.

## 2024-07-19 ENCOUNTER — IN-CLINIC DEVICE VISIT (OUTPATIENT)
Dept: CARDIOLOGY CLINIC | Facility: CLINIC | Age: 89
End: 2024-07-19
Payer: COMMERCIAL

## 2024-07-19 DIAGNOSIS — I49.5 SSS (SICK SINUS SYNDROME) (HCC): Primary | ICD-10-CM

## 2024-07-19 DIAGNOSIS — I48.91 ATRIAL FIBRILLATION, UNSPECIFIED TYPE (HCC): ICD-10-CM

## 2024-07-19 PROCEDURE — 93279 PRGRMG DEV EVAL PM/LDLS PM: CPT | Performed by: STUDENT IN AN ORGANIZED HEALTH CARE EDUCATION/TRAINING PROGRAM

## 2024-07-19 NOTE — PROGRESS NOTES
Results for orders placed or performed in visit on 07/19/24   Cardiac EP device report    Narrative    MDT SINGLE PM/ ACTIVE SYSTEM IS MRI CONDITIONAL  DEVICE INTERROGATED IN THE [place] OFFICE. BATTERY VOLTAGE ADEQUATE (14.5 YRS). : 73.1% (>40%~VVIR/70). ALL LEAD PARAMETERS WITHIN NORMAL LIMITS. NO SIGNIFICANT HIGH RATE EPISODES. NO PROGRAMMING CHANGES MADE TO DEVICE PARAMETERS. NORMAL DEVICE FUNCTION. CH

## 2024-08-20 DIAGNOSIS — E78.2 MIXED HYPERLIPIDEMIA: ICD-10-CM

## 2024-08-20 RX ORDER — ATORVASTATIN CALCIUM 10 MG/1
10 TABLET, FILM COATED ORAL DAILY
Qty: 90 TABLET | Refills: 0 | Status: SHIPPED | OUTPATIENT
Start: 2024-08-20

## 2024-08-27 NOTE — TELEPHONE ENCOUNTER
Sabine called in about her mother.  She wanted to know if she needed labs done.  There is are no active labs in the chart?  Please advise    [Negative] : Heme/Lymph

## 2024-08-29 ENCOUNTER — RA CDI HCC (OUTPATIENT)
Dept: OTHER | Facility: HOSPITAL | Age: 89
End: 2024-08-29

## 2024-09-04 NOTE — PROGRESS NOTES
Cardiology Consultation     Diana Godoy  [id number]  4/12/1933  [facility] CARDIOLOGY [place]  [address] [id number]      1. SSS (sick sinus syndrome) ([facility])  POCT ECG      2. Paroxysmal A-fib ([facility])  amiodarone 200 mg tablet      3. Chronic diastolic congestive heart failure ([facility])        4. s/p Medtronic single chamber PPM 3/26/2024        5. Primary hypertension        6. Mixed hyperlipidemia        7. LBBB (left bundle branch block)        8. Morbid obesity ([facility])        9. Atrial fibrillation ([facility])                Discussion/Summary:  Tachybradycardia syndrome  -Patient had symptomatic conversion pauses lasting over 4 seconds  -Underwent single-chamber Medtronic PPM plantation with HIS lead in LPF region  -Last device interrogation 7/19/2024 showed normal device function, no significant high rates  Paroxysmal atrial fibrillation with RVR ([facility])  - Anticoagulated on Xarelto 20 mg daily  - Rate control with Lopressor 150 mg twice daily and amiodarone 200 mg daily  - Previously on digoxin 125 mcg daily, discontinued during hospitalization in March 2024  Chronic diastolic CHF  - TTE 3/25/2024 showed EF 60%, grade 1 DD, mild LAE, estimated PA pressure 34 mmHg  - Current diuretic Rx torsemide 10 mg daily-> instructed her she can take an extra dose of torsemide in the afternoon if her lower extremity edema worsens or weight increases  - Previous on HCTZ, however discontinued due to hyponatremia  - She has some worsening lower extremity edema today, recommend she take next dose of torsemide tomorrow  - Reminded her of the importance of checking her weight daily and fluid restriction  Hypertension  -Continue with amlodipine 5 mg daily, torsemide 10 mg daily and Lopressor 150 mg twice daily  -Blood pressure is mildly elevated today, will continue to monitor  Hyperlipidemia  - Continue with atorvastatin 10 mg daily  - Lipid profile 4/29/2024 showed total cholesterol 139, triglycerides 131, HDL 38,  LDL 75  Morbid obesity  - BMI 44.4  LBBB (left bundle branch block)  CKD stage III    Follow-up in 3 months.     History of Present Illness:  Diana Godoy is a 89 y.o. year old female with a past medical history of paroxysmal atrial fibrillation, hypertension, lipidemia, LBBB, tachybradycardia syndrome status post PPM, and CHF.    4/19/2024: She is accompanied by her daughter today.  Her main complaint is difficulty sleeping at night.  She reports significant difficulty falling asleep at night.  She also reports noticing episodes of fast heart rates likely from her atrial fibrillation.  In addition, she has some increased lower extremity edema, but improved since her hospitalization.  Denies any episodes of chest pain, shortness of breath, lightheadedness/dizziness, syncope or near syncope.    5/29/2024: She is accompanied by her daughters today.  Overall she is doing okay.  She has some chronic lower extremity edema which has been relatively stable.  Her daughters are concerned she has been a little bit unsteady on her feet and has had some falls at home.  She is not interested in doing physical therapy.    Interval history:  She is accompanied by her daughters again today.  She continues to have some dyspnea on exertion and lower extremity edema, but reports is relatively stable.  It appears her legs were more swollen earlier in the week because she went out with her family and had her legs down for most of the day.  Denies any episodes of chest pain, palpitations, headache/dizziness, syncope, or other concerning cardiac symptoms at this time.    Patient Active Problem List   Diagnosis    HTN (hypertension)    Paroxysmal A-fib (HCC)    Mixed hyperlipidemia    Colon polyp    Morbid obesity (HCC)    Urge incontinence of urine    Pre-diabetes    Neck strain    Hyponatremia    LBBB (left bundle branch block)    s/p Medtronic single chamber PPM 3/26/2024    Chronic diastolic congestive heart failure (HCC)     Sick sinus syndrome (HCC)    Stage 3a chronic kidney disease (HCC)     Past Medical History:   Diagnosis Date    Afib (HCC)     Colon polyp 2018    Formatting of this note might be different from the original. Colonoscopy 18    Hyperlipidemia     Hypertension     Pre-diabetes 10/10/2022    A1c about 6.2 since     s/p Medtronic single chamber PPM 3/26/2024 2024    Sick sinus syndrome (HCC) 2024    Pacer placed 3/26/24    Stroke-like symptoms: vertigo and gait imbalance 2022    Urge incontinence of urine 2016     Social History     Socioeconomic History    Marital status: Unknown     Spouse name: Not on file    Number of children: Not on file    Years of education: Not on file    Highest education level: Not on file   Occupational History    Not on file   Tobacco Use    Smoking status: Never     Passive exposure: Past (father smoked)    Smokeless tobacco: Never   Vaping Use    Vaping status: Never Used   Substance and Sexual Activity    Alcohol use: Never    Drug use: Never    Sexual activity: Not on file   Other Topics Concern    Not on file   Social History Narrative     since  after 54 year marriage.      Three children.      Six grandchildren.  Seven great grandchildren.      Lives alone.  In her own house which [place].  2 daughters live very close.    Drives.    -has a 92-year-old sister, 82-year-old brother and a 79-year-old sister.  Lost 1 sibling. Who  at 85.     Social Determinants of Health     Financial Resource Strain: Low Risk  (10/30/2023)    Overall Financial Resource Strain (CARDIA)     Difficulty of Paying Living Expenses: Not hard at all   Food Insecurity: No Food Insecurity (3/25/2024)    Hunger Vital Sign     Worried About Running Out of Food in the Last Year: Never true     Ran Out of Food in the Last Year: Never true   Transportation Needs: No Transportation Needs (3/25/2024)    PRAPARE - Transportation     Lack of Transportation  (Medical): No     Lack of Transportation (Non-Medical): No   Physical Activity: Not on file   Stress: Not on file   Social Connections: Not on file   Intimate Partner Violence: Not on file   Housing Stability: Low Risk  (3/25/2024)    Housing Stability Vital Sign     Unable to Pay for Housing in the Last Year: No     Number of Times Moved in the Last Year: 1     Homeless in the Last Year: No      Family History   Problem Relation Age of Onset    Hypertension Mother     Cancer Mother     Stroke Father      Past Surgical History:   Procedure Laterality Date    CARDIAC ELECTROPHYSIOLOGY PROCEDURE N/A 3/26/2024    Procedure: Cardiac pacer implant;  Surgeon: Morgan Mccoy MD;  Location: [facility];  Service: Cardiology    HYSTERECTOMY      KNEE SURGERY Bilateral 2012       Current Outpatient Medications:     amiodarone 200 mg tablet, Take 1 tablet (200 mg total) by mouth daily, Disp: 90 tablet, Rfl: 2    amLODIPine (NORVASC) 10 mg tablet, Take 0.5 tablets (5 mg total) by mouth daily, Disp: , Rfl:     atorvastatin (LIPITOR) 10 mg tablet, Take 1 tablet by mouth once daily, Disp: 90 tablet, Rfl: 0    cholecalciferol (VITAMIN D3) 400 units tablet, Take 1 tablet by mouth daily, Disp: , Rfl:     cyanocobalamin (VITAMIN B-12) 100 mcg tablet, Take by mouth daily, Disp: , Rfl:     ECHINACEA-MAJANO SEAL COMPLEX PO, Take 1,400 mg by mouth in the morning, Disp: , Rfl:     ELDERBERRY PO, Take by mouth, Disp: , Rfl:     FISH OIL-KRILL OIL PO, Take by mouth, Disp: , Rfl:     Garlic 580 MG CAPS, Take 580 mg by mouth in the morning, Disp: , Rfl:     magnesium (MAGTAB) 84 MG (7MEQ) TBCR, Take 250 mg by mouth daily, Disp: , Rfl:     metoprolol tartrate (LOPRESSOR) 100 mg tablet, Take 1.5 tablets (150 mg total) by mouth 2 (two) times a day, Disp: 270 tablet, Rfl: 3    Multiple Vitamin (Daily Value Multivitamin) TABS, Take by mouth, Disp: , Rfl:     rivaroxaban (XARELTO) 20 mg tablet, Take 1 tablet (20 mg total) by mouth daily, Disp:  90 tablet, Rfl: 3    Saccharomyces boulardii (Probiotic) 250 MG CAPS, Take by mouth 2 (two) times a day, Disp: , Rfl:     torsemide (DEMADEX) 10 mg tablet, Take 1 tablet (10 mg total) by mouth daily, Disp: 90 tablet, Rfl: 3    Turmeric (QC Tumeric Complex) 500 MG CAPS, Take 500 mg by mouth in the morning, Disp: , Rfl:     zinc gluconate 50 mg tablet, Take 50 mg by mouth daily, Disp: , Rfl:   Allergies   Allergen Reactions    Lisinopril Hives     Other reaction(s): Angioedema    Oxycodone-Acetaminophen Rash and GI Intolerance         Labs:  Lab Results   Component Value Date    ALT 20 04/29/2024    AST 15 04/29/2024    BUN 14 04/29/2024    CALCIUM 9.3 04/29/2024    CL 98 04/29/2024    CO2 33 (H) 04/29/2024    CREATININE 0.96 04/29/2024    HDL 38 (L) 04/29/2024    HCT 40.3 04/29/2024    HGB 12.7 04/29/2024    HGBA1C 6.7 (H) 04/29/2024    MG 2.1 03/25/2024    PHOS 3.5 08/16/2023     04/29/2024    K 4.0 04/29/2024    TRIG 131 04/29/2024    WBC 7.49 04/29/2024       Imaging: Cardiac EP device report    Result Date: 4/17/2024  Narrative: MDT SINGLE PM/ ACTIVE SYSTEM IS MRI CONDITIONAL NB/RATES-CARELINK TRANSMISSION: PRESENTING EGRAM @ 117 BPM. PT STILL HAVING FREQUENT RATES >100 BPM. PT TO SEE DR. CHISHOLM 4/19/24. NO SIGNIFICANT HIGH RATE EPISODES. ALL AVAILABLE LEAD PARAMETERS & TRENDS WITHIN NORMAL LIMITS. NC     Cardiac EP device report    Result Date: 4/11/2024  Narrative: MDT SINGLE PM/ ACTIVE SYSTEM IS MRI CONDITIONAL PT HEART RATE BTWN 120-130 BPM WHOLE TIME WHILE IN VISIT. METO WAS RECENTLY INCREASED BY PCP DEVICE INTERROGATED IN THE [place] OFFICE: BATTERY VOLTAGE ADEQUATE-14 YRS.  64%. ALL AVAILABLE LEAD PARAMETERS WITHIN NORMAL LIMITS. NO SIGNIFICANT HIGH RATE EPISODES. NO PROGRAMMING CHANGES MADE TO DEVICE PARAMETERS. WOUND CHECK: INCISION CLEAN AND DRY WITH EDGES APPROXIMATED; STRIPS REMOVED; WOUND CARE AND RESTRICTIONS REVIEWED WITH PATIENT. NORMAL DEVICE FUNCTION. NC     XR chest portable    Result  Date: 3/27/2024  Narrative: XR CHEST PORTABLE INDICATION: Patient s/p Pacemaker/ICD Insertion. COMPARISON: 3/23/2024 FINDINGS: Left chest wall intracardiac device with intact lead(s). No abandoned lead(s). Clear lungs. No pneumothorax or pleural effusion. Cardiomediastinal silhouette is stable. There is some atherosclerosis of the aorta. Degenerative arthritis at the shoulders. Normal upper abdomen.     Impression: Left-sided pacemaker with single transvenous lead. No pneumothorax or infiltrates. Workstation performed: [id number]     XR chest 2 views    Result Date: 3/27/2024  Narrative: XR CHEST PA & LATERAL INDICATION: Patient s/p Pacemaker/ICD Insertion - To be done post-procedure day one at 7a.m. and read before 9 a.m. Do not lift affected arm above shoulder. COMPARISON: CXR 3/26/2024 and 3/23/2024. FINDINGS: Clear lungs. No pneumothorax or pleural effusion. Mild cardiomegaly with left subclavian pacemaker lead in right ventricular septum. Bones are unremarkable for age. Mild benign eventration of the right diaphragm.     Impression: Pacemaker well-positioned with no pneumothorax. Workstation performed: [id number]     Cardiac ep lab eps/ablations    Result Date: 3/26/2024  Narrative: Images from the original result were not included. PPM - His lead in LPF region History and physical were reviewed Patient was examined and history was reviewed No change in patient's condition Since history and physical has been completed The pre- operative diagnosis: Symptomatic conversion pauses Underlying sick sinus syndrome Paroxysmal / early persistent atrial fibrillation Hypertension Hyperlipidemia Hyponatremia Diastolic heart failure Postoperative diagnosis: Symptomatic conversion pauses Underlying sick sinus syndrome Paroxysmal / early persistent atrial fibrillation Hypertension Hyperlipidemia Hyponatremia Diastolic heart failure Procedure: Single  chamber PPM RV lead - His lead - with LPF capture Surgeon: Morgan Mccoy  Assistants -none Specimens - none Estimated blood loss- 10 ml Findings-none Complications none Anesthesia-  Anesthesia by anaesthesiologist , local lidocaine by myself Details of the device Description of procedure: The patient was seen before the procedure. The details of the procedure was explained and patient agreed to the same. Appropriate consent was signed. The patient was brought to the electrophysiologic laboratory. Proper time out was done. Sterile dressing and draping was done. Local lidocaine was infiltrated, In the infraclavicular region at the site of device implant. Initial incision was made by a sharp number 10 blade. Thereafter electrocautery and blunt dissection was used to form a prepectoral pocket. Appropriate hemostasis was taken care of. 10 mL of radiocontrast, followed by 20 mL of saline, was injected in a peripheral vein on the side of the implant. Under direct visualization, the axillary vein was  Punctured,extra-thoracic. A single guidewire was inserted, and taking all the way to the inferior vena cava to confirm that it is on the right side. We also confirmed by the left anterior oblique view that the wire is in the right side.  A 7F sheath passed over first wire. A His sheath and lead was passed through the sheath Mapping of His bundle done Then went distally and inferiorly and left posterior fascicle (LPF) was mapped Position of the lead was confirmed in both BOURGEOIS and Sinhala views Appropriate sensing and thresholds were noted. Lead was screwed in LPF position with non selective capture The sheath was slit and removed. Once again the lead was tested for appropriate sensing, impedance and threshold.The lead was tied down to the prepectoral fascia and muscle. The patient was paced from the LPF  for second part of procedure The pocket was washed with large amounts of antibiotic saline. Appropriate hemostasis was taken care of. The lead was connected to the generator. The generator and leads were  placed inside the pocket. The generator was tied down. Thereafter the device was interrogated and proper sensing and thresholds through the device were confirmed. Avatene - for oozing Antibiotic pouch The pocket was closed in 3 separate layers with intermittent sutures. Dressing was done with Steri-Strips, Aquacell Summary of the procedure: The patient came in to the laboratory for single -chamber device placement. The device has been placed and patient tolerated the procedure well      Echo complete w/ contrast if indicated    Result Date: 3/25/2024  Narrative:   Left Ventricle: Left ventricular cavity size is normal. Wall thickness is severely increased. There is moderate to severe concentric hypertrophy. The left ventricular ejection fraction is 60%. Systolic function is normal. Wall motion is normal. Diastolic function is mildly abnormal, consistent with grade I (abnormal) relaxation.   IVS: There is abnormal septal motion consistent with left bundle branch block.   Right Ventricle: Right ventricular cavity size is normal. Systolic function is normal.   Left Atrium: The atrium is mildly dilated.   Aortic Valve: The aortic valve is trileaflet. The leaflets are moderately thickened. The leaflets are mildly calcified. The leaflets exhibit normal mobility. There is aortic valve sclerosis.   Mitral Valve: There is moderate annular calcification.   Tricuspid Valve: The right ventricular systolic pressure is mildly elevated. The estimated right ventricular systolic pressure is 34 mmHg.     XR chest 1 view portable    Result Date: 3/23/2024  Narrative: XR CHEST PORTABLE INDICATION: near syncope. COMPARISON: 2022 FINDINGS: Clear lungs. No pneumothorax or pleural effusion. Unchanged cardiomediastinal silhouette. Atherosclerotic aorta. Bones are unremarkable for age. Normal upper abdomen.     Impression: No acute cardiopulmonary disease. Workstation performed: [id number]       EC2024: V paced at a rate of 83  "bpm with occasional PVCs    Review of Systems:  Review of Systems   Constitutional:  Negative for chills, diaphoresis, fatigue and fever.   HENT:  Negative for congestion.    Eyes:  Negative for photophobia and visual disturbance.   Respiratory:  Positive for shortness of breath. Negative for chest tightness.    Cardiovascular:  Positive for leg swelling. Negative for chest pain and palpitations.   Gastrointestinal:  Negative for abdominal distention, abdominal pain, diarrhea, nausea and vomiting.   Genitourinary:  Negative for difficulty urinating and dysuria.   Musculoskeletal:  Negative for arthralgias, gait problem and joint swelling.   Skin:  Negative for color change, pallor and rash.   Neurological:  Negative for dizziness, syncope, numbness and headaches.   Psychiatric/Behavioral:  Negative for agitation, behavioral problems and confusion.          Vitals:    09/05/24 0828   BP: 126/88   Pulse: 83          Vitals:    09/05/24 0828   Weight: 116 kg (255 lb)         Height: 5' 3\" (160 cm)     Physical Exam:  General appearance:  Appears stated age, alert, well appearing and in no distress  HEENT:  PERRLA, EOMI, no scleral icterus, no conjunctival pallor  NECK:  Supple, No elevated JVP, no thyromegaly, no carotid bruits  HEART: Tachycardic, regular rhythm, no significant audible murmurs  LUNGS:  Clear to auscultation bilaterally  ABDOMEN:  Soft, non-tender, positive bowel sounds, no rebound or guarding, no organomegaly   EXTREMITIES: 1-2+ bilateral lower extremity edema/lymphedema  VASCULAR:  Normal pedal pulses   SKIN: No lesions or rashes on exposed skin  NEURO:  CN II-XII intact, no focal deficits   "

## 2024-09-05 ENCOUNTER — OFFICE VISIT (OUTPATIENT)
Dept: CARDIOLOGY CLINIC | Facility: CLINIC | Age: 89
End: 2024-09-05
Payer: COMMERCIAL

## 2024-09-05 VITALS
HEART RATE: 83 BPM | WEIGHT: 255 LBS | SYSTOLIC BLOOD PRESSURE: 126 MMHG | HEIGHT: 63 IN | BODY MASS INDEX: 45.18 KG/M2 | DIASTOLIC BLOOD PRESSURE: 88 MMHG

## 2024-09-05 DIAGNOSIS — I44.7 LBBB (LEFT BUNDLE BRANCH BLOCK): ICD-10-CM

## 2024-09-05 DIAGNOSIS — E66.01 MORBID OBESITY (HCC): ICD-10-CM

## 2024-09-05 DIAGNOSIS — I49.5 SSS (SICK SINUS SYNDROME) (HCC): Primary | ICD-10-CM

## 2024-09-05 DIAGNOSIS — I48.91 ATRIAL FIBRILLATION (HCC): ICD-10-CM

## 2024-09-05 DIAGNOSIS — I48.0 PAROXYSMAL A-FIB (HCC): ICD-10-CM

## 2024-09-05 DIAGNOSIS — Z95.0 S/P PLACEMENT OF CARDIAC PACEMAKER: ICD-10-CM

## 2024-09-05 DIAGNOSIS — I50.32 CHRONIC DIASTOLIC CONGESTIVE HEART FAILURE (HCC): ICD-10-CM

## 2024-09-05 DIAGNOSIS — E78.2 MIXED HYPERLIPIDEMIA: ICD-10-CM

## 2024-09-05 DIAGNOSIS — I10 PRIMARY HYPERTENSION: ICD-10-CM

## 2024-09-05 PROCEDURE — 1160F RVW MEDS BY RX/DR IN RCRD: CPT | Performed by: STUDENT IN AN ORGANIZED HEALTH CARE EDUCATION/TRAINING PROGRAM

## 2024-09-05 PROCEDURE — 99214 OFFICE O/P EST MOD 30 MIN: CPT | Performed by: STUDENT IN AN ORGANIZED HEALTH CARE EDUCATION/TRAINING PROGRAM

## 2024-09-05 PROCEDURE — 93000 ELECTROCARDIOGRAM COMPLETE: CPT | Performed by: STUDENT IN AN ORGANIZED HEALTH CARE EDUCATION/TRAINING PROGRAM

## 2024-09-05 RX ORDER — AMIODARONE HYDROCHLORIDE 200 MG/1
200 TABLET ORAL DAILY
Qty: 90 TABLET | Refills: 2 | Status: SHIPPED | OUTPATIENT
Start: 2024-09-05

## 2024-10-21 ENCOUNTER — OFFICE VISIT (OUTPATIENT)
Dept: FAMILY MEDICINE CLINIC | Facility: CLINIC | Age: 89
End: 2024-10-21
Payer: COMMERCIAL

## 2024-10-21 VITALS
OXYGEN SATURATION: 96 % | WEIGHT: 256.6 LBS | DIASTOLIC BLOOD PRESSURE: 90 MMHG | TEMPERATURE: 97.9 F | HEART RATE: 76 BPM | BODY MASS INDEX: 45.46 KG/M2 | SYSTOLIC BLOOD PRESSURE: 144 MMHG | HEIGHT: 63 IN

## 2024-10-21 DIAGNOSIS — I48.0 PAROXYSMAL A-FIB (HCC): ICD-10-CM

## 2024-10-21 DIAGNOSIS — I50.32 CHRONIC DIASTOLIC CONGESTIVE HEART FAILURE (HCC): ICD-10-CM

## 2024-10-21 DIAGNOSIS — I10 PRIMARY HYPERTENSION: ICD-10-CM

## 2024-10-21 DIAGNOSIS — N18.31 STAGE 3A CHRONIC KIDNEY DISEASE (HCC): ICD-10-CM

## 2024-10-21 DIAGNOSIS — E11.9 TYPE 2 DIABETES MELLITUS WITHOUT COMPLICATION, WITHOUT LONG-TERM CURRENT USE OF INSULIN (HCC): Primary | ICD-10-CM

## 2024-10-21 DIAGNOSIS — E78.2 MIXED HYPERLIPIDEMIA: ICD-10-CM

## 2024-10-21 DIAGNOSIS — Z95.0 S/P PLACEMENT OF CARDIAC PACEMAKER: ICD-10-CM

## 2024-10-21 PROBLEM — R73.03 PRE-DIABETES: Status: RESOLVED | Noted: 2022-10-10 | Resolved: 2024-10-21

## 2024-10-21 LAB — SL AMB POCT HEMOGLOBIN AIC: 6.3 (ref ?–6.5)

## 2024-10-21 PROCEDURE — 99214 OFFICE O/P EST MOD 30 MIN: CPT | Performed by: FAMILY MEDICINE

## 2024-10-21 PROCEDURE — 83036 HEMOGLOBIN GLYCOSYLATED A1C: CPT | Performed by: FAMILY MEDICINE

## 2024-10-21 NOTE — PATIENT INSTRUCTIONS
Appears to be stable cardiovascular wise.  Medications are reviewed and stay the same.  Diabetes is controlled with an A1c of 6.3.  Previous A1c was 6.7 which put her in the diabetic category.  Check urine for protein.  Declines flu shot and COVID booster.  Recheck in 6 months.

## 2024-10-21 NOTE — PROGRESS NOTES
Chief Complaint   Patient presents with    Follow-up     6 month         HPI   Here for follow-up of paroxysmal atrial fibrillation, hypertension, hyperlipidemia, tachybradycardia syndrome status post pacemaker, and CHF.  At last visit, A1c was 6.7.  She is not on medication for diabetes.  Continues to be followed by cardiology.  Continues with some ankle swelling.      Past Medical History:   Diagnosis Date    Afib (HCC)     Colon polyp 2018    Formatting of this note might be different from the original. Colonoscopy 18    Hyperlipidemia     Hypertension     Pre-diabetes 10/10/2022    A1c about 6.2 since     s/p Medtronic single chamber PPM 3/26/2024 2024    Sick sinus syndrome (HCC) 2024    Pacer placed 3/26/24    Stroke-like symptoms: vertigo and gait imbalance 2022    Type 2 diabetes mellitus without complication, without long-term current use of insulin (HCC) 10/21/2024    A1c 6.7 2024      Urge incontinence of urine 2016        Past Surgical History:   Procedure Laterality Date    CARDIAC ELECTROPHYSIOLOGY PROCEDURE N/A 2024    Procedure: Cardiac pacer implant;  Surgeon: Morgan Mccoy MD;  Location: [facility];  Service: Cardiology    HYSTERECTOMY      KNEE SURGERY Bilateral     Knee replacement-Dr. Liu       Social History     Tobacco Use    Smoking status: Never     Passive exposure: Past (father smoked)    Smokeless tobacco: Never   Substance Use Topics    Alcohol use: Never       Social History     Social History Narrative     since  after 54 year marriage.      Three children.      Six grandchildren.  Seven great grandchildren.      Lives alone.  In her own house which is a ranch.  2 daughters live very close.    Drives.    -has a 92-year-old sister, 82-year-old brother and a 79-year-old sister.  Lost 1 sibling. Who  at 85.        The following portions of the patient's history were reviewed and updated as appropriate:  "allergies, current medications, past family history, past medical history, past social history, past surgical history, and problem list.      Review of Systems       /90 (BP Location: Left arm, Patient Position: Sitting, Cuff Size: Standard)   Pulse 76   Temp 97.9 °F (36.6 °C) (Temporal)   Ht 5' 3\" (1.6 m)   Wt 116 kg (256 lb 9.6 oz)   SpO2 96%   BMI 45.45 kg/m²      Physical Exam  Cardiovascular:      Pulses: no weak pulses.           Dorsalis pedis pulses are 1+ on the right side and 1+ on the left side.   Feet:      Right foot:      Skin integrity: No ulcer, skin breakdown, erythema, warmth, callus or dry skin.      Left foot:      Skin integrity: No ulcer, skin breakdown, erythema, warmth, callus or dry skin.        Appears comfortable.  Lungs are clear.  Heart regular.  Diffuse bony edema present.  Mood is upbeat.  Affect appropriate.  A1c 6.3.    Patient's shoes and socks removed.    Right Foot/Ankle   Right Foot Inspection  Skin Exam: skin normal and skin intact. No dry skin, no warmth, no callus, no erythema, no maceration, no abnormal color, no pre-ulcer, no ulcer and no callus.     Toe Exam: ROM and strength within normal limits.     Sensory   Monofilament testing: intact    Vascular  The right DP pulse is 1+.     Left Foot/Ankle  Left Foot Inspection  Skin Exam: skin normal and skin intact. No dry skin, no warmth, no erythema, no maceration, normal color, no pre-ulcer, no ulcer and no callus.     Toe Exam: ROM and strength within normal limits.     Sensory   Monofilament testing: intact    Vascular  The left DP pulse is 1+.     Assign Risk Category  No deformity present  No loss of protective sensation  No weak pulses  Risk: 0           Current Outpatient Medications:     amiodarone 200 mg tablet, Take 1 tablet (200 mg total) by mouth daily, Disp: 90 tablet, Rfl: 2    atorvastatin (LIPITOR) 10 mg tablet, Take 1 tablet by mouth once daily, Disp: 90 tablet, Rfl: 0    cholecalciferol (VITAMIN D3) " 400 units tablet, Take 1 tablet by mouth daily, Disp: , Rfl:     cyanocobalamin (VITAMIN B-12) 100 mcg tablet, Take by mouth daily, Disp: , Rfl:     ECHINACEA-MAJANO SEAL COMPLEX PO, Take 1,400 mg by mouth in the morning, Disp: , Rfl:     ELDERBERRY PO, Take by mouth, Disp: , Rfl:     FISH OIL-KRILL OIL PO, Take by mouth, Disp: , Rfl:     Garlic 580 MG CAPS, Take 580 mg by mouth in the morning, Disp: , Rfl:     magnesium (MAGTAB) 84 MG (7MEQ) TBCR, Take 250 mg by mouth daily, Disp: , Rfl:     metoprolol tartrate (LOPRESSOR) 100 mg tablet, Take 1.5 tablets (150 mg total) by mouth 2 (two) times a day, Disp: 270 tablet, Rfl: 3    Multiple Vitamin (Daily Value Multivitamin) TABS, Take by mouth, Disp: , Rfl:     rivaroxaban (XARELTO) 20 mg tablet, Take 1 tablet (20 mg total) by mouth daily, Disp: 90 tablet, Rfl: 3    Saccharomyces boulardii (Probiotic) 250 MG CAPS, Take by mouth 2 (two) times a day, Disp: , Rfl:     torsemide (DEMADEX) 10 mg tablet, Take 1 tablet (10 mg total) by mouth daily, Disp: 90 tablet, Rfl: 3    Turmeric (QC Tumeric Complex) 500 MG CAPS, Take 500 mg by mouth in the morning, Disp: , Rfl:     zinc gluconate 50 mg tablet, Take 50 mg by mouth daily, Disp: , Rfl:     amLODIPine (NORVASC) 10 mg tablet, Take 0.5 tablets (5 mg total) by mouth daily, Disp: , Rfl:      No problem-specific Assessment & Plan notes found for this encounter.       Diagnoses and all orders for this visit:    Type 2 diabetes mellitus without complication, without long-term current use of insulin (HCC)  -     POCT hemoglobin A1c    Primary hypertension    Paroxysmal A-fib (HCC)    Mixed hyperlipidemia    s/p Medtronic single chamber PPM 3/26/2024    Chronic diastolic congestive heart failure (HCC)    Stage 3a chronic kidney disease (HCC)        Patient Instructions   Appears to be stable cardiovascular wise.  Medications are reviewed and stay the same.  Diabetes is controlled with an A1c of 6.3.  Previous A1c was 6.7 which put her  in the diabetic category.  Check urine for protein.  Declines flu shot and COVID booster.  Recheck in 6 months.

## 2024-10-23 ENCOUNTER — REMOTE DEVICE CLINIC VISIT (OUTPATIENT)
Dept: CARDIOLOGY CLINIC | Facility: CLINIC | Age: 89
End: 2024-10-23
Payer: COMMERCIAL

## 2024-10-23 ENCOUNTER — APPOINTMENT (OUTPATIENT)
Dept: LAB | Facility: CLINIC | Age: 89
End: 2024-10-23
Payer: COMMERCIAL

## 2024-10-23 DIAGNOSIS — Z95.0 CARDIAC PACEMAKER IN SITU: Primary | ICD-10-CM

## 2024-10-23 LAB
CREAT UR-MCNC: 40.3 MG/DL
MICROALBUMIN UR-MCNC: 34.3 MG/L
MICROALBUMIN/CREAT 24H UR: 85 MG/G CREATININE (ref 0–30)

## 2024-10-23 PROCEDURE — 93296 REM INTERROG EVL PM/IDS: CPT | Performed by: INTERNAL MEDICINE

## 2024-10-23 PROCEDURE — 93294 REM INTERROG EVL PM/LDLS PM: CPT | Performed by: INTERNAL MEDICINE

## 2024-10-23 PROCEDURE — 82570 ASSAY OF URINE CREATININE: CPT | Performed by: FAMILY MEDICINE

## 2024-10-23 PROCEDURE — 82043 UR ALBUMIN QUANTITATIVE: CPT | Performed by: FAMILY MEDICINE

## 2024-10-23 NOTE — PROGRESS NOTES
Results for orders placed or performed in visit on 10/23/24   Cardiac EP device report    Narrative    MDT SINGLE PM/ ACTIVE SYSTEM IS MRI CONDITIONAL  CARELINK TRANSMISSION: BATTERY VOLTAGE ADEQUATE (14 YR).  84.1% (VVIR 70 PPM). ALL AVAILABLE LEAD PARAMETERS WITHIN NORMAL LIMITS. NO SIGNIFICANT HIGH RATE EPISODES. NORMAL DEVICE FUNCTION.  ES

## 2024-12-04 NOTE — PROGRESS NOTES
Cardiology Consultation     Diana Godoy  [id number]  4/12/1933  [facility] CARDIOLOGY [place]  [address] [id number]      1. SSS (sick sinus syndrome) ([facility])  POCT ECG      2. Cardiac pacemaker in situ  POCT ECG      3. Paroxysmal A-fib ([facility])  POCT ECG      4. Chronic diastolic congestive heart failure ([facility])  Basic metabolic panel      5. s/p Medtronic single chamber PPM 3/26/2024        6. Primary hypertension        7. Mixed hyperlipidemia        8. Morbid obesity ([facility])              Discussion/Summary:  Tachybradycardia syndrome  -Patient had symptomatic conversion pauses lasting over 4 seconds  -Underwent single-chamber Medtronic PPM plantation with HIS lead in LPF region  -Last device interrogation 10/23/2024 showed normal device function, no significant high rates  Paroxysmal atrial fibrillation with RVR ([facility])  - Anticoagulated on Xarelto 20 mg daily  - Rate control with Lopressor 150 mg twice daily and amiodarone 200 mg daily  - Previously on digoxin 125 mcg daily, discontinued during hospitalization in March 2024  Chronic diastolic CHF  - TTE 3/25/2024 showed EF 60%, grade 1 DD, mild LAE, estimated PA pressure 34 mmHg  - Current diuretic Rx torsemide 10 mg daily-> instructed her she can take an extra dose of torsemide in the afternoon if her lower extremity edema worsens or weight increases  - Previous on HCTZ, however discontinued due to hyponatremia  - Instructed her to take 20 mg of torsemide for the next 3 days and then go back to 10 mg daily  - Repeat BMP at the end of next week  - Patient's weight up 9 pounds today  - Reminded her of the importance of checking her weight daily and fluid restriction  Hypertension  -Continue with amlodipine 5 mg daily, torsemide 10 mg daily and Lopressor 150 mg twice daily  -Blood pressure is mildly elevated today, will continue to monitor  Hyperlipidemia  - Continue with atorvastatin 10 mg daily  - Lipid profile 4/29/2024 showed total  cholesterol 139, triglycerides 131, HDL 38, LDL 75  Morbid obesity  - BMI 46.8  LBBB (left bundle branch block)  CKD stage III    Follow-up in 4 to 6 weeks.    History of Present Illness:  Diana Godoy is a 89 y.o. year old female with a past medical history of paroxysmal atrial fibrillation, hypertension, lipidemia, LBBB, tachybradycardia syndrome status post PPM, and CHF.    4/19/2024: She is accompanied by her daughter today.  Her main complaint is difficulty sleeping at night.  She reports significant difficulty falling asleep at night.  She also reports noticing episodes of fast heart rates likely from her atrial fibrillation.  In addition, she has some increased lower extremity edema, but improved since her hospitalization.  Denies any episodes of chest pain, shortness of breath, lightheadedness/dizziness, syncope or near syncope.    5/29/2024: She is accompanied by her daughters today.  Overall she is doing okay.  She has some chronic lower extremity edema which has been relatively stable.  Her daughters are concerned she has been a little bit unsteady on her feet and has had some falls at home.  She is not interested in doing physical therapy.    9/5/2024: She is accompanied by her daughters again today.  She continues to have some dyspnea on exertion and lower extremity edema, but reports is relatively stable.  It appears her legs were more swollen earlier in the week because she went out with her family and had her legs down for most of the day.  Denies any episodes of chest pain, palpitations, headache/dizziness, syncope, or other concerning cardiac symptoms at this time.    Interval history:  She is accompanied by her daughters today.  Reports having some worsening lower extremity edema and having more difficulty putting her shoes on.  She has not been checking her weights regularly at home his weights have been fluctuating.  Daughters report they have the scale set up at home for her to use.   Reviewed instructions for checking her weight daily.      Patient Active Problem List   Diagnosis    HTN (hypertension)    Paroxysmal A-fib (HCC)    Mixed hyperlipidemia    Colon polyp    Morbid obesity (HCC)    Urge incontinence of urine    Neck strain    Hyponatremia    LBBB (left bundle branch block)    s/p Medtronic single chamber PPM 3/26/2024    Chronic diastolic congestive heart failure (HCC)    Sick sinus syndrome (HCC)    Stage 3a chronic kidney disease (HCC)    Type 2 diabetes mellitus without complication, without long-term current use of insulin (HCC)     Past Medical History:   Diagnosis Date    Afib (HCC)     Colon polyp 04/19/2018    Formatting of this note might be different from the original. Colonoscopy 4/19/18    Hyperlipidemia     Hypertension     Pre-diabetes 10/10/2022    A1c about 6.2 since 2018    s/p Medtronic single chamber PPM 3/26/2024 03/27/2024    Sick sinus syndrome (HCC) 04/08/2024    Pacer placed 3/26/24    Stroke-like symptoms: vertigo and gait imbalance 09/22/2022    Type 2 diabetes mellitus without complication, without long-term current use of insulin (HCC) 10/21/2024    A1c 6.7 4/29/2024      Urge incontinence of urine 05/06/2016     Social History     Socioeconomic History    Marital status: Unknown     Spouse name: Not on file    Number of children: Not on file    Years of education: Not on file    Highest education level: Not on file   Occupational History    Not on file   Tobacco Use    Smoking status: Never     Passive exposure: Past (father smoked)    Smokeless tobacco: Never   Vaping Use    Vaping status: Never Used   Substance and Sexual Activity    Alcohol use: Never    Drug use: Never    Sexual activity: Not on file   Other Topics Concern    Not on file   Social History Narrative     since 2005 after 54 year marriage.      Three children.      Six grandchildren.  Seven great grandchildren.      Lives alone.  [place] her own [place].  2 daughters live  very close.    Drives.    -has a 92-year-old sister, 82-year-old brother and a 79-year-old sister.  Lost 1 sibling. Who  at 85.     Social Drivers of Health     Financial Resource Strain: Low Risk  (10/30/2023)    Overall Financial Resource Strain (CARDIA)     Difficulty of Paying Living Expenses: Not hard at all   Food Insecurity: No Food Insecurity (3/25/2024)    Nursing - Inadequate Food Risk Classification     Worried About Running Out of Food in the Last Year: Never true     Ran Out of Food in the Last Year: Never true     Ran Out of Food in the Last Year: Not on file   Transportation Needs: No Transportation Needs (3/25/2024)    PRAPARE - Transportation     Lack of Transportation (Medical): No     Lack of Transportation (Non-Medical): No   Physical Activity: Not on file   Stress: Not on file   Social Connections: Not on file   Intimate Partner Violence: Not on file   Housing Stability: Low Risk  (3/25/2024)    Housing Stability Vital Sign     Unable to Pay for Housing in the Last Year: No     Number of Times Moved in the Last Year: 1     Homeless in the Last Year: No      Family History   Problem Relation Age of Onset    Hypertension Mother     Cancer Mother     Stroke Father      Past Surgical History:   Procedure Laterality Date    CARDIAC ELECTROPHYSIOLOGY PROCEDURE N/A 2024    Procedure: Cardiac pacer implant;  Surgeon: Morgan Mccoy MD;  Location: [facility];  Service: Cardiology    HYSTERECTOMY      KNEE SURGERY Bilateral 2012    Knee replacement-Dr. Liu       Current Outpatient Medications:     amiodarone 200 mg tablet, Take 1 tablet (200 mg total) by mouth daily, Disp: 90 tablet, Rfl: 2    amLODIPine (NORVASC) 10 mg tablet, Take 0.5 tablets (5 mg total) by mouth daily, Disp: , Rfl:     atorvastatin (LIPITOR) 10 mg tablet, Take 1 tablet by mouth once daily, Disp: 90 tablet, Rfl: 0    cholecalciferol (VITAMIN D3) 400 units tablet, Take 1 tablet by mouth daily, Disp: , Rfl:      cyanocobalamin (VITAMIN B-12) 100 mcg tablet, Take by mouth daily, Disp: , Rfl:     ECHINACEA-MAJANO SEAL COMPLEX PO, Take 1,400 mg by mouth in the morning, Disp: , Rfl:     ELDERBERRY PO, Take by mouth, Disp: , Rfl:     FISH OIL-KRILL OIL PO, Take by mouth, Disp: , Rfl:     Garlic 580 MG CAPS, Take 580 mg by mouth in the morning, Disp: , Rfl:     magnesium (MAGTAB) 84 MG (7MEQ) TBCR, Take 250 mg by mouth daily, Disp: , Rfl:     metoprolol tartrate (LOPRESSOR) 100 mg tablet, Take 1.5 tablets (150 mg total) by mouth 2 (two) times a day, Disp: 270 tablet, Rfl: 3    Multiple Vitamin (Daily Value Multivitamin) TABS, Take by mouth, Disp: , Rfl:     rivaroxaban (XARELTO) 20 mg tablet, Take 1 tablet (20 mg total) by mouth daily, Disp: 90 tablet, Rfl: 3    Saccharomyces boulardii (Probiotic) 250 MG CAPS, Take by mouth 2 (two) times a day, Disp: , Rfl:     torsemide (DEMADEX) 10 mg tablet, Take 1 tablet (10 mg total) by mouth daily, Disp: 90 tablet, Rfl: 3    Turmeric (QC Tumeric Complex) 500 MG CAPS, Take 500 mg by mouth in the morning, Disp: , Rfl:     zinc gluconate 50 mg tablet, Take 50 mg by mouth daily, Disp: , Rfl:   Allergies   Allergen Reactions    Lisinopril Hives     Other reaction(s): Angioedema    Oxycodone-Acetaminophen Rash and GI Intolerance         Labs:  Lab Results   Component Value Date    ALT 20 04/29/2024    AST 15 04/29/2024    BUN 14 04/29/2024    CALCIUM 9.3 04/29/2024    CL 98 04/29/2024    CO2 33 (H) 04/29/2024    CREATININE 0.96 04/29/2024    HDL 38 (L) 04/29/2024    HCT 40.3 04/29/2024    HGB 12.7 04/29/2024    HGBA1C 6.3 10/21/2024    MG 2.1 03/25/2024    PHOS 3.5 08/16/2023     04/29/2024    K 4.0 04/29/2024    TRIG 131 04/29/2024    WBC 7.49 04/29/2024       Imaging: Cardiac EP device report    Result Date: 4/17/2024  Narrative: MDT SINGLE PM/ ACTIVE SYSTEM IS MRI CONDITIONAL NB/RATES-CARELINK TRANSMISSION: PRESENTING EGRAM @ 117 BPM. PT STILL HAVING FREQUENT RATES >100 BPM. PT TO SEE  DR. CHISHOLM 4/19/24. NO SIGNIFICANT HIGH RATE EPISODES. ALL AVAILABLE LEAD PARAMETERS & TRENDS WITHIN NORMAL LIMITS. NC     Cardiac EP device report    Result Date: 4/11/2024  Narrative: MDT SINGLE PM/ ACTIVE SYSTEM IS MRI CONDITIONAL PT HEART RATE BTWN 120-130 BPM WHOLE TIME WHILE IN VISIT. METO WAS RECENTLY INCREASED BY PCP DEVICE INTERROGATED IN THE [place] OFFICE: BATTERY VOLTAGE ADEQUATE-14 YRS.  64%. ALL AVAILABLE LEAD PARAMETERS WITHIN NORMAL LIMITS. NO SIGNIFICANT HIGH RATE EPISODES. NO PROGRAMMING CHANGES MADE TO DEVICE PARAMETERS. WOUND CHECK: INCISION CLEAN AND DRY WITH EDGES APPROXIMATED; STRIPS REMOVED; WOUND CARE AND RESTRICTIONS REVIEWED WITH PATIENT. NORMAL DEVICE FUNCTION. NC     XR chest portable    Result Date: 3/27/2024  Narrative: XR CHEST PORTABLE INDICATION: Patient s/p Pacemaker/ICD Insertion. COMPARISON: 3/23/2024 FINDINGS: Left chest wall intracardiac device with intact lead(s). No abandoned lead(s). Clear lungs. No pneumothorax or pleural effusion. Cardiomediastinal silhouette is stable. There is some atherosclerosis of the aorta. Degenerative arthritis at the shoulders. Normal upper abdomen.     Impression: Left-sided pacemaker with single transvenous lead. No pneumothorax or infiltrates. Workstation performed: [id number]     XR chest 2 views    Result Date: 3/27/2024  Narrative: XR CHEST PA & LATERAL INDICATION: Patient s/p Pacemaker/ICD Insertion - To be done post-procedure day one at 7a.m. and read before 9 a.m. Do not lift affected arm above shoulder. COMPARISON: CXR 3/26/2024 and 3/23/2024. FINDINGS: Clear lungs. No pneumothorax or pleural effusion. Mild cardiomegaly with left subclavian pacemaker lead in right ventricular septum. Bones are unremarkable for age. Mild benign eventration of the right diaphragm.     Impression: Pacemaker well-positioned with no pneumothorax. Workstation performed: [id number]     Cardiac ep lab eps/ablations    Result Date: 3/26/2024  Narrative: Images  from the original result were not included. PPM - His lead in LPF region History and physical were reviewed Patient was examined and history was reviewed No change in patient's condition Since history and physical has been completed The pre- operative diagnosis: Symptomatic conversion pauses Underlying sick sinus syndrome Paroxysmal / early persistent atrial fibrillation Hypertension Hyperlipidemia Hyponatremia Diastolic heart failure Postoperative diagnosis: Symptomatic conversion pauses Underlying sick sinus syndrome Paroxysmal / early persistent atrial fibrillation Hypertension Hyperlipidemia Hyponatremia Diastolic heart failure Procedure: Single  chamber PPM RV lead - His lead - with LPF capture Surgeon: Morgan Mccoy Assistants -none Specimens - none Estimated blood loss- 10 ml Findings-none Complications none Anesthesia-  Anesthesia by anaesthesiologist , local lidocaine by myself Details of the device Description of procedure: The patient was seen before the procedure. The details of the procedure was explained and patient agreed to the same. Appropriate consent was signed. The patient was brought to the electrophysiologic laboratory. Proper time out was done. Sterile dressing and draping was done. Local lidocaine was infiltrated, In the infraclavicular region at the site of device implant. Initial incision was made by a sharp number 10 blade. Thereafter electrocautery and blunt dissection was used to form a prepectoral pocket. Appropriate hemostasis was taken care of. 10 mL of radiocontrast, followed by 20 mL of saline, was injected in a peripheral vein on the side of the implant. Under direct visualization, the axillary vein was  Punctured,extra-thoracic. A single guidewire was inserted, and taking all the way to the inferior vena cava to confirm that it is on the right side. We also confirmed by the left anterior oblique view that the wire is in the right side.  A 7F sheath passed over first wire. A His  sheath and lead was passed through the sheath Mapping of His bundle done Then went distally and inferiorly and left posterior fascicle (LPF) was mapped Position of the lead was confirmed in both [place] and [place] views Appropriate sensing and thresholds were noted. Lead was screwed in LPF position with non selective capture The sheath was slit and removed. Once again the lead was tested for appropriate sensing, impedance and threshold.The lead was tied down to the prepectoral fascia and muscle. The patient was paced from the LPF  for second part of procedure The pocket was washed with large amounts of antibiotic saline. Appropriate hemostasis was taken care of. The lead was connected to the generator. The generator and leads were placed inside the pocket. The generator was tied down. Thereafter the device was interrogated and proper sensing and thresholds through the device were confirmed. Avatene - for oozing Antibiotic pouch The pocket was closed in 3 separate layers with intermittent sutures. Dressing was done with Steri-Strips, Aquacell Summary of the procedure: The patient came in to the laboratory for single -chamber device placement. The device has been placed and patient tolerated the procedure well      Echo complete w/ contrast if indicated    Result Date: 3/25/2024  Narrative:   Left Ventricle: Left ventricular cavity size is normal. Wall thickness is severely increased. There is moderate to severe concentric hypertrophy. The left ventricular ejection fraction is 60%. Systolic function is normal. Wall motion is normal. Diastolic function is mildly abnormal, consistent with grade I (abnormal) relaxation.   IVS: There is abnormal septal motion consistent with left bundle branch block.   Right Ventricle: Right ventricular cavity size is normal. Systolic function is normal.   Left Atrium: The atrium is mildly dilated.   Aortic Valve: The aortic valve is trileaflet. The leaflets are moderately thickened. The  leaflets are mildly calcified. The leaflets exhibit normal mobility. There is aortic valve sclerosis.   Mitral Valve: There is moderate annular calcification.   Tricuspid Valve: The right ventricular systolic pressure is mildly elevated. The estimated right ventricular systolic pressure is 34 mmHg.     XR chest 1 view portable    Result Date: 3/23/2024  Narrative: XR CHEST PORTABLE INDICATION: near syncope. COMPARISON: 2022 FINDINGS: Clear lungs. No pneumothorax or pleural effusion. Unchanged cardiomediastinal silhouette. Atherosclerotic aorta. Bones are unremarkable for age. Normal upper abdomen.     Impression: No acute cardiopulmonary disease. Workstation performed: [id number]       EC2024: V paced at a rate of 74 bpm with occasional PVCs    Review of Systems:  Review of Systems   Constitutional:  Negative for chills, diaphoresis, fatigue and fever.   HENT:  Negative for congestion.    Eyes:  Negative for photophobia and visual disturbance.   Respiratory:  Negative for chest tightness.    Cardiovascular:  Positive for leg swelling. Negative for chest pain and palpitations.   Gastrointestinal:  Negative for abdominal distention, abdominal pain, diarrhea, nausea and vomiting.   Genitourinary:  Negative for difficulty urinating and dysuria.   Musculoskeletal:  Negative for arthralgias, gait problem and joint swelling.   Skin:  Negative for color change, pallor and rash.   Neurological:  Negative for dizziness, syncope, numbness and headaches.   Psychiatric/Behavioral:  Negative for agitation, behavioral problems and confusion.          Vitals:    24 0935   BP: 148/88   Pulse: 74            Vitals:    24 0935   Weight: 120 kg (264 lb)                 Physical Exam:  General appearance:  Appears stated age, alert, well appearing and in no distress  HEENT:  PERRLA, EOMI, no scleral icterus, no conjunctival pallor  NECK:  Supple, No elevated JVP, no thyromegaly, no carotid bruits  HEART:  Tachycardic, regular rhythm, no significant audible murmurs  LUNGS:  Clear to auscultation bilaterally  ABDOMEN:  Soft, non-tender, positive bowel sounds, no rebound or guarding, no organomegaly   EXTREMITIES: 1-2+ bilateral lower extremity edema/lymphedema  VASCULAR:  Normal pedal pulses   SKIN: No lesions or rashes on exposed skin  NEURO:  CN II-XII intact, no focal deficits

## 2024-12-06 ENCOUNTER — OFFICE VISIT (OUTPATIENT)
Dept: CARDIOLOGY CLINIC | Facility: CLINIC | Age: 89
End: 2024-12-06
Payer: COMMERCIAL

## 2024-12-06 VITALS
HEART RATE: 74 BPM | WEIGHT: 264 LBS | BODY MASS INDEX: 46.77 KG/M2 | DIASTOLIC BLOOD PRESSURE: 88 MMHG | SYSTOLIC BLOOD PRESSURE: 148 MMHG

## 2024-12-06 DIAGNOSIS — I10 PRIMARY HYPERTENSION: ICD-10-CM

## 2024-12-06 DIAGNOSIS — E78.2 MIXED HYPERLIPIDEMIA: ICD-10-CM

## 2024-12-06 DIAGNOSIS — I49.5 SSS (SICK SINUS SYNDROME) (HCC): Primary | ICD-10-CM

## 2024-12-06 DIAGNOSIS — I50.32 CHRONIC DIASTOLIC CONGESTIVE HEART FAILURE (HCC): ICD-10-CM

## 2024-12-06 DIAGNOSIS — E66.01 MORBID OBESITY (HCC): ICD-10-CM

## 2024-12-06 DIAGNOSIS — Z95.0 CARDIAC PACEMAKER IN SITU: ICD-10-CM

## 2024-12-06 DIAGNOSIS — I48.0 PAROXYSMAL A-FIB (HCC): ICD-10-CM

## 2024-12-06 DIAGNOSIS — Z95.0 S/P PLACEMENT OF CARDIAC PACEMAKER: ICD-10-CM

## 2024-12-06 PROCEDURE — 93000 ELECTROCARDIOGRAM COMPLETE: CPT | Performed by: STUDENT IN AN ORGANIZED HEALTH CARE EDUCATION/TRAINING PROGRAM

## 2024-12-06 PROCEDURE — 99214 OFFICE O/P EST MOD 30 MIN: CPT | Performed by: STUDENT IN AN ORGANIZED HEALTH CARE EDUCATION/TRAINING PROGRAM

## 2024-12-13 ENCOUNTER — APPOINTMENT (OUTPATIENT)
Dept: LAB | Facility: CLINIC | Age: 89
End: 2024-12-13
Payer: COMMERCIAL

## 2024-12-13 DIAGNOSIS — I50.32 CHRONIC DIASTOLIC CONGESTIVE HEART FAILURE (HCC): ICD-10-CM

## 2024-12-13 LAB
ANION GAP SERPL CALCULATED.3IONS-SCNC: 10 MMOL/L (ref 4–13)
BUN SERPL-MCNC: 20 MG/DL (ref 5–25)
CALCIUM SERPL-MCNC: 9.2 MG/DL (ref 8.4–10.2)
CHLORIDE SERPL-SCNC: 98 MMOL/L (ref 96–108)
CO2 SERPL-SCNC: 32 MMOL/L (ref 21–32)
CREAT SERPL-MCNC: 1.3 MG/DL (ref 0.6–1.3)
GFR SERPL CREATININE-BSD FRML MDRD: 35 ML/MIN/1.73SQ M
GLUCOSE SERPL-MCNC: 122 MG/DL (ref 65–140)
POTASSIUM SERPL-SCNC: 4.3 MMOL/L (ref 3.5–5.3)
SODIUM SERPL-SCNC: 140 MMOL/L (ref 135–147)

## 2024-12-13 PROCEDURE — 36415 COLL VENOUS BLD VENIPUNCTURE: CPT

## 2024-12-13 PROCEDURE — 80048 BASIC METABOLIC PNL TOTAL CA: CPT

## 2024-12-26 DIAGNOSIS — I10 PRIMARY HYPERTENSION: ICD-10-CM

## 2024-12-27 RX ORDER — AMLODIPINE BESYLATE 5 MG/1
5 TABLET ORAL DAILY
Qty: 90 TABLET | Refills: 3 | Status: SHIPPED | OUTPATIENT
Start: 2024-12-27

## 2025-01-07 NOTE — PROGRESS NOTES
Diana Godoy  4/12/1933  [id number]  [facility] CARDIOLOGY [place]  [address] [place] [phone]    1. Chronic diastolic congestive heart failure (HCC)        2. Paroxysmal A-fib (HCC)        3. Sick sinus syndrome (HCC)        4. Primary hypertension        5. Mixed hyperlipidemia        6. Morbid obesity (HCC)          Assessment/Plan  TODAY (01/08/25):   She is about 5 lbs down on our scale, has 1+ pitting edema to her calf. Shared decision making with daughters and patient to watch her weights closely and call the office with a weight gain of > 3 lbs in one day or 5 lbs in one week. Patient does not want to go up on the torsemide at this time as she is worried about her kidney function.  BP similar to last visit, instructed to monitor salt and will check at next visit to see if it continues to be elevated.  Advised conservative measures like raising her legs or compression socks to help with the edema.  Continue lopressor and amlodipine.  Continue atorvastatin  RTO 02/03/24 with me    Chronic diastolic heart failure   - TTE 3/25/2024 showed EF 60%, grade 1 DD, mild LAE, estimated PA pressure 34 mmHg   - office weight 12/06/24: 264 lbs  - office weight today: 259 lbs   - Neurohormonal Blockade:   -- beta blocker: lopressor 150 mg BID  -- diuretic: torsemide 10 mg daily + Mg 250 mg daily   Paroxysmal atrial fibrillation   - rate control: Lopressor 150 mg BID (previously was on digoxin which was discontinued March 2024)  - rhythm control: amiodarone 200 mg daily   - AC: xarelto 20 mg daily   History of sick sinus syndrome  - s/p Medtronic single lead with His lead placement in LPF   - device interrogation 10/23/24: showed normal device function with no significant high rate episodes  Hypertension  - BP in office: 148/91 mmHg  - current regimen: amlodipine 5 mg daily and lopressor 150 mg BID  Mixed hyperlipidemia   - lipid panel 04/29/24: cholesterol 139, triglycerides  131, HDL 38, LDL 75  - current rx: [id number] mg daily   Morbid obesity       Interval History:   This is a 89 y/o female with a PMH of chronic HFpEF, HTN, PAF, SSS s/p Medtronic PPM, HLD who is presenting today for follow up. She was last seen in office 12/06/24 by Dr. Simon. At this visit, she had worsening extremity edema. Instructed to increase torsemide to 20 mg for the next 3 days and then to return to 10 mg daily. She was 9 lbs up at this appointment. Labs drawn on 12/13/24 showed stable electrolytes and kidney function.    She presents with both her daughters today for follow up. She states she feels like her legs have improved since last visit. She did not start weighing herself until 12/17/24 which was after she doubled up on her diuretics. Her home scale is about a 10 lbs difference from our (baseline at home 267-270 lbs). She is wearing a compression sock today. Feels like she is urinating well on her torsemide. Did not take it yet this AM since she had to come here. She admits to some SOB when she is getting ready in the morning or if she is in the kitchen standing for a long time, but this is her baseline. Denies abdominal bloating, chest pain, MADRID, palpitations, or flutters in her chest.    Past Medical History:   Diagnosis Date    Afib ([facility])     Colon polyp 04/19/2018    Formatting of this note might be different from the original. Colonoscopy 4/19/18    Hyperlipidemia     Hypertension     Pre-diabetes 10/10/2022    A1c about 6.2 since 2018    s/p Medtronic single chamber PPM 3/26/2024 03/27/2024    Sick sinus syndrome (HCC) 04/08/2024    Pacer placed 3/26/24    Stroke-like symptoms: vertigo and gait imbalance 09/22/2022    Type 2 diabetes mellitus without complication, without long-term current use of insulin (HCC) 10/21/2024    A1c 6.7 4/29/2024      Urge incontinence of urine 05/06/2016     Social History     Socioeconomic History    Marital status: Unknown     Spouse name: Not on file     Adequate: hears normal conversation without difficulty Number of children: Not on file    Years of education: Not on file    Highest education level: Not on file   Occupational History    Not on file   Tobacco Use    Smoking status: Never     Passive exposure: Past (father smoked)    Smokeless tobacco: Never   Vaping Use    Vaping status: Never Used   Substance and Sexual Activity    Alcohol use: Never    Drug use: Never    Sexual activity: Not on file   Other Topics Concern    Not on file   Social History Narrative     since  after 54 year marriage.      Three children.      Six grandchildren.  Seven great grandchildren.      Lives alone.  In her own house [place].  2 daughters live very close.    Drives.    -has a 92-year-old sister, 82-year-old brother and a 79-year-old sister.  Lost 1 sibling. Who  at 85.     Social Drivers of Health     Financial Resource Strain: Low Risk  (10/30/2023)    Overall Financial Resource Strain (CARDIA)     Difficulty of Paying Living Expenses: Not hard at all   Food Insecurity: No Food Insecurity (3/25/2024)    Nursing - Inadequate Food Risk Classification     Worried About Running Out of Food in the Last Year: Never true     Ran Out of Food in the Last Year: Never true     Ran Out of Food in the Last Year: Not on file   Transportation Needs: No Transportation Needs (3/25/2024)    PRAPARE - Transportation     Lack of Transportation (Medical): No     Lack of Transportation (Non-Medical): No   Physical Activity: Not on file   Stress: Not on file   Social Connections: Not on file   Intimate Partner Violence: Not on file   Housing Stability: Low Risk  (3/25/2024)    Housing Stability Vital Sign     Unable to Pay for Housing in the Last Year: No     Number of Times Moved in the Last Year: 1     Homeless in the Last Year: No      Family History   Problem Relation Age of Onset    Hypertension Mother     Cancer Mother     Stroke Father      Past Surgical History:   Procedure Laterality Date    CARDIAC ELECTROPHYSIOLOGY  PROCEDURE N/A 03/26/2024    Procedure: Cardiac pacer implant;  Surgeon: Morgan Mccoy MD;  [facility];  Service: Cardiology    HYSTERECTOMY      KNEE SURGERY Bilateral 2012    Knee replacement-Dr. Liu       Current Outpatient Medications:     amiodarone 200 mg tablet, Take 1 tablet (200 mg total) by mouth daily, Disp: 90 tablet, Rfl: 2    amLODIPine (NORVASC) 5 mg tablet, Take 1 tablet by mouth once daily, Disp: 90 tablet, Rfl: 3    atorvastatin (LIPITOR) 10 mg tablet, Take 1 tablet by mouth once daily, Disp: 90 tablet, Rfl: 0    cholecalciferol (VITAMIN D3) 400 units tablet, Take 1 tablet by mouth daily, Disp: , Rfl:     cyanocobalamin (VITAMIN B-12) 100 mcg tablet, Take by mouth daily, Disp: , Rfl:     ECHINACEA-MAJANO SEAL COMPLEX PO, Take 1,400 mg by mouth in the morning, Disp: , Rfl:     ELDERBERRY PO, Take by mouth, Disp: , Rfl:     FISH OIL-KRILL OIL PO, Take by mouth, Disp: , Rfl:     Garlic 580 MG CAPS, Take 580 mg by mouth in the morning, Disp: , Rfl:     magnesium (MAGTAB) 84 MG (7MEQ) TBCR, Take 250 mg by mouth daily, Disp: , Rfl:     metoprolol tartrate (LOPRESSOR) 100 mg tablet, Take 1.5 tablets (150 mg total) by mouth 2 (two) times a day, Disp: 270 tablet, Rfl: 3    Multiple Vitamin (Daily Value Multivitamin) TABS, Take by mouth, Disp: , Rfl:     rivaroxaban (XARELTO) 20 mg tablet, Take 1 tablet (20 mg total) by mouth daily, Disp: 90 tablet, Rfl: 3    Saccharomyces boulardii (Probiotic) 250 MG CAPS, Take by mouth 2 (two) times a day, Disp: , Rfl:     torsemide (DEMADEX) 10 mg tablet, Take 1 tablet (10 mg total) by mouth daily, Disp: 90 tablet, Rfl: 3    Turmeric (QC Tumeric Complex) 500 MG CAPS, Take 500 mg by mouth in the morning, Disp: , Rfl:     zinc gluconate 50 mg tablet, Take 50 mg by mouth daily, Disp: , Rfl:   Allergies   Allergen Reactions    Lisinopril Hives     Other reaction(s): Angioedema    Oxycodone-Acetaminophen Rash and GI Intolerance       Labs:     Chemistry       "  Component Value Date/Time    K 4.3 12/13/2024 1030    K 3.9 05/13/2022 0804    CL 98 12/13/2024 1030     05/13/2022 0804    CO2 32 12/13/2024 1030    CO2 30 05/13/2022 0804    BUN 20 12/13/2024 1030    BUN 17 05/13/2022 0804    CREATININE 1.30 12/13/2024 1030    CREATININE 0.86 05/13/2022 0804        Component Value Date/Time    CALCIUM 9.2 12/13/2024 1030    CALCIUM 9.5 05/13/2022 0804    ALKPHOS 73 04/29/2024 0834    ALKPHOS 72 05/13/2022 0804    AST 15 04/29/2024 0834    AST 14 05/13/2022 0804    ALT 20 04/29/2024 0834    ALT 19 05/13/2022 0804        Lab Results   Component Value Date    HDL 38 (L) 04/29/2024    HDL 50 09/23/2022     Lab Results   Component Value Date    LDLCALC 75 04/29/2024    LDLCALC 89 09/23/2022     Lab Results   Component Value Date    TRIG 131 04/29/2024    TRIG 112 09/23/2022     Imaging: No results found.    Review of Systems   Constitutional: Negative for chills, diaphoresis, fever, malaise/fatigue and weight gain.   Cardiovascular:  Positive for leg swelling. Negative for chest pain, dyspnea on exertion, irregular heartbeat, near-syncope, orthopnea, palpitations, paroxysmal nocturnal dyspnea and syncope.   Respiratory:  Negative for cough, shortness of breath, sleep disturbances due to breathing, snoring and wheezing.    Skin:  Negative for rash.   Gastrointestinal:  Negative for bloating, abdominal pain and nausea.   Neurological:  Negative for dizziness and light-headedness.       Vitals:    01/08/25 0853   BP: 148/91   Pulse: 87     Vitals:    01/08/25 0853   Weight: 117 kg (259 lb)     Height: 5' 3\" (160 cm)   Body mass index is 45.88 kg/m².    Physical Exam  Constitutional:       General: She is not in acute distress.     Appearance: Normal appearance. She is obese. She is not ill-appearing.   HENT:      Head: Normocephalic and atraumatic.      Nose: Nose normal.      Mouth/Throat:      Mouth: Mucous membranes are moist.   Eyes:      General: No scleral " icterus.  Cardiovascular:      Rate and Rhythm: Normal rate and regular rhythm.      Pulses:           Radial pulses are 2+ on the right side and 2+ on the left side.      Heart sounds: No murmur heard.     No friction rub. No gallop. No S3 or S4 sounds.   Pulmonary:      Effort: Pulmonary effort is normal. No respiratory distress.      Breath sounds: Normal breath sounds. No stridor. No wheezing, rhonchi or rales.   Abdominal:      General: Abdomen is flat.   Musculoskeletal:         General: Swelling (1+ pitting edema starting at her foot to the mid calf) present.      Right lower leg: Edema present.      Left lower leg: Edema present.   Skin:     General: Skin is warm.      Capillary Refill: Capillary refill takes less than 2 seconds.   Neurological:      Mental Status: She is alert. Mental status is at baseline.   Psychiatric:         Thought Content: Thought content normal.

## 2025-01-08 ENCOUNTER — OFFICE VISIT (OUTPATIENT)
Dept: CARDIOLOGY CLINIC | Facility: CLINIC | Age: OVER 89
End: 2025-01-08
Payer: COMMERCIAL

## 2025-01-08 VITALS
BODY MASS INDEX: 45.89 KG/M2 | DIASTOLIC BLOOD PRESSURE: 91 MMHG | HEART RATE: 87 BPM | HEIGHT: 63 IN | WEIGHT: 259 LBS | SYSTOLIC BLOOD PRESSURE: 148 MMHG

## 2025-01-08 DIAGNOSIS — I48.0 PAROXYSMAL A-FIB (HCC): ICD-10-CM

## 2025-01-08 DIAGNOSIS — I10 PRIMARY HYPERTENSION: ICD-10-CM

## 2025-01-08 DIAGNOSIS — I50.32 CHRONIC DIASTOLIC CONGESTIVE HEART FAILURE (HCC): Primary | ICD-10-CM

## 2025-01-08 DIAGNOSIS — E66.01 MORBID OBESITY (HCC): ICD-10-CM

## 2025-01-08 DIAGNOSIS — E78.2 MIXED HYPERLIPIDEMIA: ICD-10-CM

## 2025-01-08 DIAGNOSIS — I49.5 SICK SINUS SYNDROME (HCC): ICD-10-CM

## 2025-01-08 PROCEDURE — 99214 OFFICE O/P EST MOD 30 MIN: CPT

## 2025-01-09 ENCOUNTER — PATIENT MESSAGE (OUTPATIENT)
Dept: FAMILY MEDICINE CLINIC | Facility: CLINIC | Age: OVER 89
End: 2025-01-09

## 2025-01-15 LAB
DME PARACHUTE DELIVERY DATE REQUESTED: NORMAL
DME PARACHUTE ITEM DESCRIPTION: NORMAL
DME PARACHUTE ORDER STATUS: NORMAL
DME PARACHUTE SUPPLIER NAME: NORMAL
DME PARACHUTE SUPPLIER PHONE: NORMAL

## 2025-01-22 ENCOUNTER — REMOTE DEVICE CLINIC VISIT (OUTPATIENT)
Dept: CARDIOLOGY CLINIC | Facility: CLINIC | Age: OVER 89
End: 2025-01-22
Payer: COMMERCIAL

## 2025-01-22 ENCOUNTER — RESULTS FOLLOW-UP (OUTPATIENT)
Dept: CARDIOLOGY CLINIC | Facility: CLINIC | Age: OVER 89
End: 2025-01-22

## 2025-01-22 DIAGNOSIS — Z95.0 PRESENCE OF PERMANENT CARDIAC PACEMAKER: Primary | ICD-10-CM

## 2025-01-22 PROCEDURE — 93294 REM INTERROG EVL PM/LDLS PM: CPT | Performed by: INTERNAL MEDICINE

## 2025-01-22 PROCEDURE — 93296 REM INTERROG EVL PM/IDS: CPT | Performed by: INTERNAL MEDICINE

## 2025-01-22 NOTE — PROGRESS NOTES
MDT SC PM/ACTIVE SYSTEM IS MRI CONDITIONAL   CARELINK TRANSMISSION:  BATTERY VOLTAGE ADEQUATE (13.4 YR.).   83.3% VVIR 70 PPM).  ALL LEAD PARAMETERS WITHIN NORMAL LIMITS.  NO SIGNIFICANT HIGH RATE EPISODES.  NORMAL DEVICE FUNCTION.  RG

## 2025-01-28 NOTE — PROGRESS NOTES
[facility]  4/12/1933  [id number]  [facility] CARDIOLOGY [place]  [address] [id number] [phone]    1. Chronic diastolic congestive heart failure (HCC)        2. Primary hypertension        3. Paroxysmal A-fib (HCC)        4. Sick sinus syndrome (HCC)        5. Mixed hyperlipidemia        6. Morbid obesity (HCC)            Assessment/Plan  TODAY (02/03/25):   She is about 8 lbs down on our scale, with mild edma. Shared decision making with daughters and patient to watch her weights closely and call the office with a weight gain of > 3 lbs in one day or 5 lbs in one week.   BP stable  Advised conservative measures like raising her legs or compression socks to help with the edema.  Continue lopressor and amlodipine.  Continue atorvastatin  RTO 05/07/25 with Dr. Simon   Chronic diastolic heart failure   - TTE 3/25/2024 showed EF 60%, grade 1 DD, mild LAE, estimated PA pressure 34 mmHg   - office weight 01/08/25: 259 lbs  - office weight today: 251 lbs   - Neurohormonal Blockade:   -- beta blocker: lopressor 150 mg BID  -- diuretic: torsemide 10 mg daily + Mg 250 mg daily   Paroxysmal atrial fibrillation   - rate control: Lopressor 150 mg BID (previously was on digoxin which was discontinued March 2024)  - rhythm control: amiodarone 200 mg daily   - AC: xarelto 20 mg daily   History of sick sinus syndrome  - s/p Medtronic single lead with His lead placement in LPF   - device interrogation 01/22/25: showed normal device function with no significant high rate episodes  Hypertension  - BP in office:   - current regimen: amlodipine 5 mg daily and lopressor 150 mg BID  Mixed hyperlipidemia   - lipid panel 04/29/24: cholesterol 139, triglycerides 131, HDL 38, LDL 75  - current rx: [id number] mg daily   Morbid obesity       Interval History:   This is a 89 y/o female with a PMH of chronic HFpEF, HTN, PAF, SSS s/p Medtronic PPM, HLD who is presenting today for follow up. She  was last seen in office 01/08/25 by me. At this visit, she felt like her extremity edema improved compared to last visit with Dr. Simon and was down 5 lbs on our scale. We made no medications changes at this visit.    Pt presents with her daughters for follow up. Her edema is improved compared to last visit. She got velcro compression wraps which has improved her edema in her legs. She is wearing them daily. On our scale, she is down 8 lbs since our last visit. Her home scale is around 260 lbs. She is still urinating well on torsemide. Has SOB that is baseline for her. Denies abdominal bloating, chest pain, MADRID, palpitations, or flutters in her chest.     Past Medical History:   Diagnosis Date   • Afib ([facility])    • Colon polyp 04/19/2018    Formatting of this note might be different from the original. Colonoscopy 4/19/18   • Hyperlipidemia    • Hypertension    • Pre-diabetes 10/10/2022    A1c about 6.2 since 2018   • s/p Medtronic single chamber PPM 3/26/2024 03/27/2024   • Sick sinus syndrome ([facility]) 04/08/2024    Pacer placed 3/26/24   • Stroke-like symptoms: vertigo and gait imbalance 09/22/2022   • Type 2 diabetes mellitus without complication, without long-term current use of insulin ([facility]) 10/21/2024    A1c 6.7 4/29/2024     • Urge incontinence of urine 05/06/2016     Social History     Socioeconomic History   • Marital status: Unknown     Spouse name: Not on file   • Number of children: Not on file   • Years of education: Not on file   • Highest education level: Not on file   Occupational History   • Not on file   Tobacco Use   • Smoking status: Never     Passive exposure: Past (father smoked)   • Smokeless tobacco: Never   Vaping Use   • Vaping status: Never Used   Substance and Sexual Activity   • Alcohol use: Never   • Drug use: Never   • Sexual activity: Not on file   Other Topics Concern   • Not on file   Social History Narrative     since 2005 after 54 year marriage.      Three children.      Six  grandchildren.  Seven great grandchildren.      Lives alone.  In her own house [place].  2 daughters live very close.    Drives.    -has a 92-year-old sister, 82-year-old brother and a 79-year-old sister.  Lost 1 sibling. Who  at 85.     Social Drivers of Health     Financial Resource Strain: Low Risk  (10/30/2023)    Overall Financial Resource Strain (CARDIA)    • Difficulty of Paying Living Expenses: Not hard at all   Food Insecurity: No Food Insecurity (3/25/2024)    Nursing - Inadequate Food Risk Classification    • Worried About Running Out of Food in the Last Year: Never true    • Ran Out of Food in the Last Year: Never true    • Ran Out of Food in the Last Year: Not on file   Transportation Needs: No Transportation Needs (3/25/2024)    PRAPARE - Transportation    • Lack of Transportation (Medical): No    • Lack of Transportation (Non-Medical): No   Physical Activity: Not on file   Stress: Not on file   Social Connections: Not on file   Intimate Partner Violence: Not on file   Housing Stability: Low Risk  (3/25/2024)    Housing Stability Vital Sign    • Unable to Pay for Housing in the Last Year: No    • Number of Times Moved in the Last Year: 1    • Homeless in the Last Year: No      Family History   Problem Relation Age of Onset   • Hypertension Mother    • Cancer Mother    • Stroke Father      Past Surgical History:   Procedure Laterality Date   • CARDIAC ELECTROPHYSIOLOGY PROCEDURE N/A 2024    Procedure: Cardiac pacer implant;  Surgeon: Morgan Mccoy MD;  Location: [facility] CARDIAC [facility];  Service: Cardiology   • HYSTERECTOMY     • KNEE SURGERY Bilateral     Knee replacement-Dr. Liu       Current Outpatient Medications:   •  amiodarone 200 mg tablet, Take 1 tablet (200 mg total) by mouth daily, Disp: 90 tablet, Rfl: 2  •  amLODIPine (NORVASC) 5 mg tablet, Take 1 tablet by mouth once daily, Disp: 90 tablet, Rfl: 3  •  atorvastatin (LIPITOR) 10 mg tablet, Take 1 tablet by mouth once  daily, Disp: 90 tablet, Rfl: 0  •  cholecalciferol (VITAMIN D3) 400 units tablet, Take 1 tablet by mouth daily, Disp: , Rfl:   •  cyanocobalamin (VITAMIN B-12) 100 mcg tablet, Take by mouth daily, Disp: , Rfl:   •  ECHINACEA-MAJANO SEAL COMPLEX PO, Take 1,400 mg by mouth in the morning, Disp: , Rfl:   •  ELDERBERRY PO, Take by mouth, Disp: , Rfl:   •  FISH OIL-KRILL OIL PO, Take by mouth, Disp: , Rfl:   •  Garlic 580 MG CAPS, Take 580 mg by mouth in the morning, Disp: , Rfl:   •  magnesium (MAGTAB) 84 MG (7MEQ) TBCR, Take 250 mg by mouth daily, Disp: , Rfl:   •  metoprolol tartrate (LOPRESSOR) 100 mg tablet, Take 1.5 tablets (150 mg total) by mouth 2 (two) times a day, Disp: 270 tablet, Rfl: 3  •  Multiple Vitamin (Daily Value Multivitamin) TABS, Take by mouth, Disp: , Rfl:   •  rivaroxaban (XARELTO) 20 mg tablet, Take 1 tablet (20 mg total) by mouth daily, Disp: 90 tablet, Rfl: 3  •  Saccharomyces boulardii (Probiotic) 250 MG CAPS, Take by mouth 2 (two) times a day, Disp: , Rfl:   •  torsemide (DEMADEX) 10 mg tablet, Take 1 tablet (10 mg total) by mouth daily, Disp: 90 tablet, Rfl: 3  •  Turmeric (QC Tumeric Complex) 500 MG CAPS, Take 500 mg by mouth in the morning, Disp: , Rfl:   •  zinc gluconate 50 mg tablet, Take 50 mg by mouth daily, Disp: , Rfl:   Allergies   Allergen Reactions   • Lisinopril Hives     Other reaction(s): Angioedema   • Oxycodone-Acetaminophen Rash and GI Intolerance       Labs:     Chemistry        Component Value Date/Time    K 4.3 12/13/2024 1030    K 3.9 05/13/2022 0804    CL 98 12/13/2024 1030     05/13/2022 0804    CO2 32 12/13/2024 1030    CO2 30 05/13/2022 0804    BUN 20 12/13/2024 1030    BUN 17 05/13/2022 0804    CREATININE 1.30 12/13/2024 1030    CREATININE 0.86 05/13/2022 0804        Component Value Date/Time    CALCIUM 9.2 12/13/2024 1030    CALCIUM 9.5 05/13/2022 0804    ALKPHOS 73 04/29/2024 0834    ALKPHOS 72 05/13/2022 0804    AST 15 04/29/2024 0834    AST 14 05/13/2022  0804    ALT 20 04/29/2024 0834    ALT 19 05/13/2022 0804        Lab Results   Component Value Date    HDL 38 (L) 04/29/2024    HDL 50 09/23/2022     Lab Results   Component Value Date    LDLCALC 75 04/29/2024    LDLCALC 89 09/23/2022     Lab Results   Component Value Date    TRIG 131 04/29/2024    TRIG 112 09/23/2022     Imaging: No results found.    Review of Systems   Constitutional: Negative for chills, diaphoresis, fever, malaise/fatigue and weight gain.   Cardiovascular:  Positive for leg swelling. Negative for chest pain, dyspnea on exertion, irregular heartbeat, near-syncope, orthopnea, palpitations, paroxysmal nocturnal dyspnea and syncope.   Respiratory:  Negative for cough, shortness of breath, sleep disturbances due to breathing, snoring and wheezing.    Skin:  Negative for rash.   Gastrointestinal:  Negative for bloating, abdominal pain and nausea.   Neurological:  Negative for dizziness and light-headedness.       Vitals:    02/03/25 0939   BP: 128/70   Pulse: 80       Vitals:    02/03/25 0939   Weight: 114 kg (251 lb 12.8 oz)           Body mass index is 44.6 kg/m².    Physical Exam  Constitutional:       General: She is not in acute distress.     Appearance: Normal appearance. She is obese. She is not ill-appearing.   HENT:      Head: Normocephalic and atraumatic.      Nose: Nose normal.      Mouth/Throat:      Mouth: Mucous membranes are moist.   Eyes:      General: No scleral icterus.  Cardiovascular:      Rate and Rhythm: Normal rate and regular rhythm.      Pulses:           Radial pulses are 2+ on the right side and 2+ on the left side.      Heart sounds: No murmur heard.     No friction rub. No gallop. No S3 or S4 sounds.   Pulmonary:      Effort: Pulmonary effort is normal. No respiratory distress.      Breath sounds: Normal breath sounds. No stridor. No wheezing, rhonchi or rales.   Abdominal:      General: Abdomen is flat.   Musculoskeletal:         General: Swelling (mild edema) present.       Right lower leg: Edema present.      Left lower leg: Edema present.   Skin:     General: Skin is warm.      Capillary Refill: Capillary refill takes less than 2 seconds.   Neurological:      Mental Status: She is alert. Mental status is at baseline.   Psychiatric:         Thought Content: Thought content normal.

## 2025-02-03 ENCOUNTER — OFFICE VISIT (OUTPATIENT)
Dept: CARDIOLOGY CLINIC | Facility: CLINIC | Age: OVER 89
End: 2025-02-03
Payer: COMMERCIAL

## 2025-02-03 VITALS
HEART RATE: 80 BPM | DIASTOLIC BLOOD PRESSURE: 70 MMHG | BODY MASS INDEX: 44.6 KG/M2 | WEIGHT: 251.8 LBS | SYSTOLIC BLOOD PRESSURE: 128 MMHG

## 2025-02-03 DIAGNOSIS — I50.32 CHRONIC DIASTOLIC CONGESTIVE HEART FAILURE (HCC): Primary | ICD-10-CM

## 2025-02-03 DIAGNOSIS — I48.0 PAROXYSMAL A-FIB (HCC): ICD-10-CM

## 2025-02-03 DIAGNOSIS — I49.5 SICK SINUS SYNDROME (HCC): ICD-10-CM

## 2025-02-03 DIAGNOSIS — E66.01 MORBID OBESITY (HCC): ICD-10-CM

## 2025-02-03 DIAGNOSIS — E78.2 MIXED HYPERLIPIDEMIA: ICD-10-CM

## 2025-02-03 DIAGNOSIS — I10 PRIMARY HYPERTENSION: ICD-10-CM

## 2025-02-03 PROCEDURE — 99214 OFFICE O/P EST MOD 30 MIN: CPT

## 2025-04-14 ENCOUNTER — RA CDI HCC (OUTPATIENT)
Dept: OTHER | Facility: HOSPITAL | Age: OVER 89
End: 2025-04-14

## 2025-04-14 NOTE — PROGRESS NOTES
HCC coding opportunities          Chart Reviewed number of suggestions sent to Provider: 2     Patients Insurance     Medicare Insurance: [employer]vantage        E11.22, I13.0

## 2025-04-21 ENCOUNTER — OFFICE VISIT (OUTPATIENT)
Dept: FAMILY MEDICINE CLINIC | Facility: CLINIC | Age: OVER 89
End: 2025-04-21
Payer: COMMERCIAL

## 2025-04-21 ENCOUNTER — APPOINTMENT (OUTPATIENT)
Dept: LAB | Facility: CLINIC | Age: OVER 89
End: 2025-04-21
Payer: COMMERCIAL

## 2025-04-21 VITALS
TEMPERATURE: 97.1 F | BODY MASS INDEX: 45.71 KG/M2 | WEIGHT: 258 LBS | DIASTOLIC BLOOD PRESSURE: 78 MMHG | SYSTOLIC BLOOD PRESSURE: 110 MMHG | HEIGHT: 63 IN | OXYGEN SATURATION: 96 % | HEART RATE: 82 BPM

## 2025-04-21 DIAGNOSIS — E78.2 MIXED HYPERLIPIDEMIA: ICD-10-CM

## 2025-04-21 DIAGNOSIS — I48.0 PAROXYSMAL A-FIB (HCC): ICD-10-CM

## 2025-04-21 DIAGNOSIS — Z99.89 USES WALKER: ICD-10-CM

## 2025-04-21 DIAGNOSIS — N18.31 STAGE 3A CHRONIC KIDNEY DISEASE (HCC): ICD-10-CM

## 2025-04-21 DIAGNOSIS — Z00.00 MEDICARE ANNUAL WELLNESS VISIT, SUBSEQUENT: ICD-10-CM

## 2025-04-21 DIAGNOSIS — E11.9 TYPE 2 DIABETES MELLITUS WITHOUT COMPLICATION, WITHOUT LONG-TERM CURRENT USE OF INSULIN (HCC): Primary | ICD-10-CM

## 2025-04-21 DIAGNOSIS — Z95.0 S/P PLACEMENT OF CARDIAC PACEMAKER: ICD-10-CM

## 2025-04-21 DIAGNOSIS — I10 PRIMARY HYPERTENSION: ICD-10-CM

## 2025-04-21 DIAGNOSIS — E66.01 MORBID OBESITY (HCC): ICD-10-CM

## 2025-04-21 PROBLEM — S16.1XXA NECK STRAIN: Status: RESOLVED | Noted: 2023-08-14 | Resolved: 2025-04-21

## 2025-04-21 LAB
ANION GAP SERPL CALCULATED.3IONS-SCNC: 8 MMOL/L (ref 4–13)
BASOPHILS # BLD AUTO: 0.02 THOUSANDS/ÂΜL (ref 0–0.1)
BASOPHILS NFR BLD AUTO: 0 % (ref 0–1)
BUN SERPL-MCNC: 20 MG/DL (ref 5–25)
CALCIUM SERPL-MCNC: 9.6 MG/DL (ref 8.4–10.2)
CHLORIDE SERPL-SCNC: 97 MMOL/L (ref 96–108)
CHOLEST SERPL-MCNC: 181 MG/DL (ref ?–200)
CO2 SERPL-SCNC: 34 MMOL/L (ref 21–32)
CREAT SERPL-MCNC: 1.28 MG/DL (ref 0.6–1.3)
EOSINOPHIL # BLD AUTO: 0.14 THOUSAND/ÂΜL (ref 0–0.61)
EOSINOPHIL NFR BLD AUTO: 2 % (ref 0–6)
ERYTHROCYTE [DISTWIDTH] IN BLOOD BY AUTOMATED COUNT: 13.6 % (ref 11.6–15.1)
GFR SERPL CREATININE-BSD FRML MDRD: 36 ML/MIN/1.73SQ M
GLUCOSE SERPL-MCNC: 123 MG/DL (ref 65–140)
HCT VFR BLD AUTO: 39.4 % (ref 34.8–46.1)
HDLC SERPL-MCNC: 59 MG/DL
HGB BLD-MCNC: 12.7 G/DL (ref 11.5–15.4)
IMM GRANULOCYTES # BLD AUTO: 0.03 THOUSAND/UL (ref 0–0.2)
IMM GRANULOCYTES NFR BLD AUTO: 0 % (ref 0–2)
LDLC SERPL CALC-MCNC: 99 MG/DL (ref 0–100)
LYMPHOCYTES # BLD AUTO: 1.24 THOUSANDS/ÂΜL (ref 0.6–4.47)
LYMPHOCYTES NFR BLD AUTO: 18 % (ref 14–44)
MCH RBC QN AUTO: 29.7 PG (ref 26.8–34.3)
MCHC RBC AUTO-ENTMCNC: 32.2 G/DL (ref 31.4–37.4)
MCV RBC AUTO: 92 FL (ref 82–98)
MONOCYTES # BLD AUTO: 0.53 THOUSAND/ÂΜL (ref 0.17–1.22)
MONOCYTES NFR BLD AUTO: 8 % (ref 4–12)
NEUTROPHILS # BLD AUTO: 4.9 THOUSANDS/ÂΜL (ref 1.85–7.62)
NEUTS SEG NFR BLD AUTO: 72 % (ref 43–75)
NONHDLC SERPL-MCNC: 122 MG/DL
NRBC BLD AUTO-RTO: 0 /100 WBCS
PLATELET # BLD AUTO: 251 THOUSANDS/UL (ref 149–390)
PMV BLD AUTO: 11.1 FL (ref 8.9–12.7)
POTASSIUM SERPL-SCNC: 4.9 MMOL/L (ref 3.5–5.3)
RBC # BLD AUTO: 4.28 MILLION/UL (ref 3.81–5.12)
SL AMB POCT HEMOGLOBIN AIC: 6.3 (ref ?–6.5)
SODIUM SERPL-SCNC: 139 MMOL/L (ref 135–147)
T4 FREE SERPL-MCNC: 1.06 NG/DL (ref 0.61–1.12)
TRIGL SERPL-MCNC: 117 MG/DL (ref ?–150)
TSH SERPL DL<=0.05 MIU/L-ACNC: 1.77 UIU/ML (ref 0.45–4.5)
WBC # BLD AUTO: 6.86 THOUSAND/UL (ref 4.31–10.16)

## 2025-04-21 PROCEDURE — G0439 PPPS, SUBSEQ VISIT: HCPCS | Performed by: FAMILY MEDICINE

## 2025-04-21 PROCEDURE — 80048 BASIC METABOLIC PNL TOTAL CA: CPT

## 2025-04-21 PROCEDURE — 85025 COMPLETE CBC W/AUTO DIFF WBC: CPT

## 2025-04-21 PROCEDURE — 36415 COLL VENOUS BLD VENIPUNCTURE: CPT

## 2025-04-21 PROCEDURE — 80061 LIPID PANEL: CPT

## 2025-04-21 PROCEDURE — 84443 ASSAY THYROID STIM HORMONE: CPT

## 2025-04-21 PROCEDURE — 84439 ASSAY OF FREE THYROXINE: CPT

## 2025-04-21 PROCEDURE — 83036 HEMOGLOBIN GLYCOSYLATED A1C: CPT | Performed by: FAMILY MEDICINE

## 2025-04-21 PROCEDURE — G2211 COMPLEX E/M VISIT ADD ON: HCPCS | Performed by: FAMILY MEDICINE

## 2025-04-21 PROCEDURE — 99214 OFFICE O/P EST MOD 30 MIN: CPT | Performed by: FAMILY MEDICINE

## 2025-04-21 RX ORDER — METOPROLOL TARTRATE 100 MG/1
150 TABLET ORAL 2 TIMES DAILY
Qty: 270 TABLET | Refills: 3 | Status: SHIPPED | OUTPATIENT
Start: 2025-04-21

## 2025-04-21 NOTE — PATIENT INSTRUCTIONS
Medicare wellness exam is completed.  Diabetes well-controlled with an A1c of 6.3.  Recheck kidney function, blood count, thyroid, and lipid profile.  Medications are reviewed and stay the same.  It does not matter what time she takes her vitamins.  Recheck in 6 months.    Medicare Preventive Visit Patient Instructions  Thank you for completing your Welcome to Medicare Visit or Medicare Annual Wellness Visit today. Your next wellness visit will be due in one year (4/22/2026).  The screening/preventive services that you may require over the next 5-10 years are detailed below. Some tests may not apply to you based off risk factors and/or age. Screening tests ordered at today's visit but not completed yet may show as past due. Also, please note that scanned in results may not display below.  Preventive Screenings:  Service Recommendations Previous Testing/Comments   Colorectal Cancer Screening  * Colonoscopy    * Fecal Occult Blood Test (FOBT)/Fecal Immunochemical Test (FIT)  * Fecal DNA/Cologuard Test  * Flexible Sigmoidoscopy Age: 45-75 years old   Colonoscopy: every 10 years (may be performed more frequently if at higher risk)  OR  FOBT/FIT: every 1 year  OR  Cologuard: every 3 years  OR  Sigmoidoscopy: every 5 years  Screening may be recommended earlier than age 45 if at higher risk for colorectal cancer. Also, an individualized decision between you and your healthcare provider will decide whether screening between the ages of 76-85 would be appropriate. Colonoscopy: Not on file  FOBT/FIT: Not on file  Cologuard: Not on file  Sigmoidoscopy: Not on file          Breast Cancer Screening Age: 40+ years old  Frequency: every 1-2 years  Not required if history of left and right mastectomy Mammogram: 05/06/2016        Cervical Cancer Screening Between the ages of 21-29, pap smear recommended once every 3 years.   Between the ages of 30-65, can perform pap smear with HPV co-testing every 5 years.   Recommendations may  differ for women with a history of total hysterectomy, cervical cancer, or abnormal pap smears in past. Pap Smear: Not on file        Hepatitis C Screening Once for adults born between 1945 and 1965  More frequently in patients at high risk for Hepatitis C Hep C Antibody: Not on file        Diabetes Screening 1-2 times per year if you're at risk for diabetes or have pre-diabetes Fasting glucose: 111 mg/dL (4/29/2024)  A1C: 6.3 (10/21/2024)      Cholesterol Screening Once every 5 years if you don't have a lipid disorder. May order more often based on risk factors. Lipid panel: 04/29/2024          Other Preventive Screenings Covered by Medicare:  Abdominal Aortic Aneurysm (AAA) Screening: covered once if your at risk. You're considered to be at risk if you have a family history of AAA.  Lung Cancer Screening: covers low dose CT scan once per year if you meet all of the following conditions: (1) Age 55-77; (2) No signs or symptoms of lung cancer; (3) Current smoker or have quit smoking within the last 15 years; (4) You have a tobacco smoking history of at least 20 pack years (packs per day multiplied by number of years you smoked); (5) You get a written order from a healthcare provider.  Glaucoma Screening: covered annually if you're considered high risk: (1) You have diabetes OR (2) Family history of glaucoma OR (3)  aged 50 and older OR (4)  American aged 65 and older  Osteoporosis Screening: covered every 2 years if you meet one of the following conditions: (1) You're estrogen deficient and at risk for osteoporosis based off medical history and other findings; (2) Have a vertebral abnormality; (3) On glucocorticoid therapy for more than 3 months; (4) Have primary hyperparathyroidism; (5) On osteoporosis medications and need to assess response to drug therapy.   Last bone density test (DXA Scan): Not on file.  HIV Screening: covered annually if you're between the age of 15-65. Also covered  annually if you are younger than 15 and older than 65 with risk factors for HIV infection. For pregnant patients, it is covered up to 3 times per pregnancy.    Immunizations:  Immunization Recommendations   Influenza Vaccine Annual influenza vaccination during flu season is recommended for all persons aged >= 6 months who do not have contraindications   Pneumococcal Vaccine   * Pneumococcal conjugate vaccine = PCV13 (Prevnar 13), PCV15 (Vaxneuvance), PCV20 (Prevnar 20)  * Pneumococcal polysaccharide vaccine = PPSV23 (Pneumovax) Adults 19-65 yo with certain risk factors or if 65+ yo  If never received any pneumonia vaccine: recommend Prevnar 20 (PCV20)  Give PCV20 if previously received 1 dose of PCV13 or PPSV23   Hepatitis B Vaccine 3 dose series if at intermediate or high risk (ex: diabetes, end stage renal disease, liver disease)   Respiratory syncytial virus (RSV) Vaccine - COVERED BY MEDICARE PART D  * RSVPreF3 (Arexvy) CDC recommends that adults 60 years of age and older may receive a single dose of RSV vaccine using shared clinical decision-making (SCDM)   Tetanus (Td) Vaccine - COST NOT COVERED BY MEDICARE PART B Following completion of primary series, a booster dose should be given every 10 years to maintain immunity against tetanus. Td may also be given as tetanus wound prophylaxis.   Tdap Vaccine - COST NOT COVERED BY MEDICARE PART B Recommended at least once for all adults. For pregnant patients, recommended with each pregnancy.   Shingles Vaccine (Shingrix) - COST NOT COVERED BY MEDICARE PART B  2 shot series recommended in those 19 years and older who have or will have weakened immune systems or those 50 years and older     Health Maintenance Due:  There are no preventive care reminders to display for this patient.  Immunizations Due:      Topic Date Due    Pneumococcal Vaccine: 65+ Years (1 of 2 - PCV) Never done    Influenza Vaccine (1) Never done    COVID-19 Vaccine (3 - 2024-25 season) 09/01/2024      Advance Directives   What are advance directives?  Advance directives are legal documents that state your wishes and plans for medical care. These plans are made ahead of time in case you lose your ability to make decisions for yourself. Advance directives can apply to any medical decision, such as the treatments you want, and if you want to donate organs.   What are the types of advance directives?  There are many types of advance directives, and each state has rules about how to use them. You may choose a combination of any of the following:  Living will:  This is a written record of the treatment you want. You can also choose which treatments you do not want, which to limit, and which to stop at a certain time. This includes surgery, medicine, IV fluid, and tube feedings.   Durable power of  for healthcare (DPAHC):  This is a written record that states who you want to make healthcare choices for you when you are unable to make them for yourself. This person, called a proxy, is usually a family member or a friend. You may choose more than 1 proxy.  Do not resuscitate (DNR) order:  A DNR order is used in case your heart stops beating or you stop breathing. It is a request not to have certain forms of treatment, such as CPR. A DNR order may be included in other types of advance directives.  Medical directive:  This covers the care that you want if you are in a coma, near death, or unable to make decisions for yourself. You can list the treatments you want for each condition. Treatment may include pain medicine, surgery, blood transfusions, dialysis, IV or tube feedings, and a ventilator (breathing machine).  Values history:  This document has questions about your views, beliefs, and how you feel and think about life. This information can help others choose the care that you would choose.  Why are advance directives important?  An advance directive helps you control your care. Although spoken wishes may  be used, it is better to have your wishes written down. Spoken wishes can be misunderstood, or not followed. Treatments may be given even if you do not want them. An advance directive may make it easier for your family to make difficult choices about your care.   Weight Management   Why it is important to manage your weight:  Being overweight increases your risk of health conditions such as heart disease, high blood pressure, type 2 diabetes, and certain types of cancer. It can also increase your risk for osteoarthritis, sleep apnea, and other respiratory problems. Aim for a slow, steady weight loss. Even a small amount of weight loss can lower your risk of health problems.  How to lose weight safely:  A safe and healthy way to lose weight is to eat fewer calories and get regular exercise. You can lose up about 1 pound a week by decreasing the number of calories you eat by 500 calories each day.   Healthy meal plan for weight management:  A healthy meal plan includes a variety of foods, contains fewer calories, and helps you stay healthy. A healthy meal plan includes the following:  Eat whole-grain foods more often.  A healthy meal plan should contain fiber. Fiber is the part of grains, fruits, and vegetables that is not broken down by your body. Whole-grain foods are healthy and provide extra fiber in your diet. Some examples of whole-grain foods are whole-wheat breads and pastas, oatmeal, brown rice, and bulgur.  Eat a variety of vegetables every day.  Include dark, leafy greens such as spinach, kale, nayla greens, and mustard greens. Eat yellow and orange vegetables such as carrots, sweet potatoes, and winter squash.   Eat a variety of fruits every day.  Choose fresh or canned fruit (canned in its own juice or light syrup) instead of juice. Fruit juice has very little or no fiber.  Eat low-fat dairy foods.  Drink fat-free (skim) milk or 1% milk. Eat fat-free yogurt and low-fat cottage cheese. Try low-fat  cheeses such as mozzarella and other reduced-fat cheeses.  Choose meat and other protein foods that are low in fat.  Choose beans or other legumes such as split peas or lentils. Choose fish, skinless poultry (chicken or turkey), or lean cuts of red meat (beef or pork). Before you cook meat or poultry, cut off any visible fat.   Use less fat and oil.  Try baking foods instead of frying them. Add less fat, such as margarine, sour cream, regular salad dressing and mayonnaise to foods. Eat fewer high-fat foods. Some examples of high-fat foods include french fries, doughnuts, ice cream, and cakes.  Eat fewer sweets.  Limit foods and drinks that are high in sugar. This includes candy, cookies, regular soda, and sweetened drinks.  Exercise:  Exercise at least 30 minutes per day on most days of the week. Some examples of exercise include walking, biking, dancing, and swimming. You can also fit in more physical activity by taking the stairs instead of the elevator or parking farther away from stores. Ask your healthcare provider about the best exercise plan for you.      © Copyright [employer] 2018 Information is for End User's use only and may not be sold, redistributed or otherwise used for commercial purposes. All illustrations and images included in CareNotes® are the copyrighted property of [employer] or [employer]

## 2025-04-21 NOTE — PROGRESS NOTES
Name: Diana Godoy      : 1933      MRN: [id number]  Encounter Provider: Ezio Mckenna MD  Encounter Date: 2025   Encounter department: [facility]    Assessment & Plan  Medicare annual wellness visit, subsequent         Primary hypertension    Orders:  •  metoprolol tartrate (LOPRESSOR) 100 mg tablet; Take 1.5 tablets (150 mg total) by mouth 2 (two) times a day    Paroxysmal A-fib (HCC)    Orders:  •  rivaroxaban (XARELTO) 20 mg tablet; Take 1 tablet (20 mg total) by mouth daily  •  T4, free; Future  •  TSH, 3rd generation; Future    Type 2 diabetes mellitus without complication, without long-term current use of insulin (HCC)    Lab Results   Component Value Date    HGBA1C 6.3 2025       Orders:  •  POCT hemoglobin A1c    Stage 3a chronic kidney disease ([facility])  Lab Results   Component Value Date    EGFR 35 2024    EGFR 51 2024    EGFR 59 2024    CREATININE 1.30 2024    CREATININE 0.96 2024    CREATININE 0.86 2024       Orders:  •  Basic metabolic panel; Future  •  CBC and differential; Future    Mixed hyperlipidemia    Orders:  •  Lipid panel; Future    s/p Medtronic single chamber PPM 3/26/2024         Morbid obesity (HCC)           Uses walker            Patient Instructions   Medicare wellness exam is completed.  Diabetes well-controlled with an A1c of 6.3.  Recheck kidney function, blood count, thyroid, and lipid profile.  Medications are reviewed and stay the same.  It does not matter what time she takes her vitamins.  Recheck in 6 months.    Medicare Preventive Visit Patient Instructions  Thank you for completing your Welcome to Medicare Visit or Medicare Annual Wellness Visit today. Your next wellness visit will be due in one year (2026).  The screening/preventive services that you may require over the next 5-10 years are detailed below. Some tests may not apply to you based off risk factors and/or age. Screening tests ordered  at today's visit but not completed yet may show as past due. Also, please note that scanned in results may not display below.  Preventive Screenings:  Service Recommendations Previous Testing/Comments   Colorectal Cancer Screening  * Colonoscopy    * Fecal Occult Blood Test (FOBT)/Fecal Immunochemical Test (FIT)  * Fecal DNA/Cologuard Test  * Flexible Sigmoidoscopy Age: 45-75 years old   Colonoscopy: every 10 years (may be performed more frequently if at higher risk)  OR  FOBT/FIT: every 1 year  OR  Cologuard: every 3 years  OR  Sigmoidoscopy: every 5 years  Screening may be recommended earlier than age 45 if at higher risk for colorectal cancer. Also, an individualized decision between you and your healthcare provider will decide whether screening between the ages of 76-85 would be appropriate. Colonoscopy: Not on file  FOBT/FIT: Not on file  Cologuard: Not on file  Sigmoidoscopy: Not on file          Breast Cancer Screening Age: 40+ years old  Frequency: every 1-2 years  Not required if history of left and right mastectomy Mammogram: 05/06/2016        Cervical Cancer Screening Between the ages of 21-29, pap smear recommended once every 3 years.   Between the ages of 30-65, can perform pap smear with HPV co-testing every 5 years.   Recommendations may differ for women with a history of total hysterectomy, cervical cancer, or abnormal pap smears in past. Pap Smear: Not on file        Hepatitis C Screening Once for adults born between 1945 and 1965  More frequently in patients at high risk for Hepatitis C Hep C Antibody: Not on file        Diabetes Screening 1-2 times per year if you're at risk for diabetes or have pre-diabetes Fasting glucose: 111 mg/dL (4/29/2024)  A1C: 6.3 (10/21/2024)      Cholesterol Screening Once every 5 years if you don't have a lipid disorder. May order more often based on risk factors. Lipid panel: 04/29/2024          Other Preventive Screenings Covered by Medicare:  Abdominal Aortic  Aneurysm (AAA) Screening: covered once if your at risk. You're considered to be at risk if you have a family history of AAA.  Lung Cancer Screening: covers low dose CT scan once per year if you meet all of the following conditions: (1) Age 55-77; (2) No signs or symptoms of lung cancer; (3) Current smoker or have quit smoking within the last 15 years; (4) You have a tobacco smoking history of at least 20 pack years (packs per day multiplied by number of years you smoked); (5) You get a written order from a healthcare provider.  Glaucoma Screening: covered annually if you're considered high risk: (1) You have diabetes OR (2) Family history of glaucoma OR (3)  aged 50 and older OR (4)  American aged 65 and older  Osteoporosis Screening: covered every 2 years if you meet one of the following conditions: (1) You're estrogen deficient and at risk for osteoporosis based off medical history and other findings; (2) Have a vertebral abnormality; (3) On glucocorticoid therapy for more than 3 months; (4) Have primary hyperparathyroidism; (5) On osteoporosis medications and need to assess response to drug therapy.   Last bone density test (DXA Scan): Not on file.  HIV Screening: covered annually if you're between the age of 15-65. Also covered annually if you are younger than 15 and older than 65 with risk factors for HIV infection. For pregnant patients, it is covered up to 3 times per pregnancy.    Immunizations:  Immunization Recommendations   Influenza Vaccine Annual influenza vaccination during flu season is recommended for all persons aged >= 6 months who do not have contraindications   Pneumococcal Vaccine   * Pneumococcal conjugate vaccine = PCV13 (Prevnar 13), PCV15 (Vaxneuvance), PCV20 (Prevnar 20)  * Pneumococcal polysaccharide vaccine = PPSV23 (Pneumovax) Adults 19-63 yo with certain risk factors or if 65+ yo  If never received any pneumonia vaccine: recommend Prevnar 20 (PCV20)  Give PCV20  if previously received 1 dose of PCV13 or PPSV23   Hepatitis B Vaccine 3 dose series if at intermediate or high risk (ex: diabetes, end stage renal disease, liver disease)   Respiratory syncytial virus (RSV) Vaccine - COVERED BY MEDICARE PART D  * RSVPreF3 (Arexvy) CDC recommends that adults 60 years of age and older may receive a single dose of RSV vaccine using shared clinical decision-making (SCDM)   Tetanus (Td) Vaccine - COST NOT COVERED BY MEDICARE PART B Following completion of primary series, a booster dose should be given every 10 years to maintain immunity against tetanus. Td may also be given as tetanus wound prophylaxis.   Tdap Vaccine - COST NOT COVERED BY MEDICARE PART B Recommended at least once for all adults. For pregnant patients, recommended with each pregnancy.   Shingles Vaccine (Shingrix) - COST NOT COVERED BY MEDICARE PART B  2 shot series recommended in those 19 years and older who have or will have weakened immune systems or those 50 years and older     Health Maintenance Due:  There are no preventive care reminders to display for this patient.  Immunizations Due:      Topic Date Due   • Pneumococcal Vaccine: 65+ Years (1 of 2 - PCV) Never done   • Influenza Vaccine (1) Never done   • COVID-19 Vaccine (3 - 2024-25 season) 09/01/2024     Advance Directives   What are advance directives?  Advance directives are legal documents that state your wishes and plans for medical care. These plans are made ahead of time in case you lose your ability to make decisions for yourself. Advance directives can apply to any medical decision, such as the treatments you want, and if you want to donate organs.   What are the types of advance directives?  There are many types of advance directives, and each state has rules about how to use them. You may choose a combination of any of the following:  Living will:  This is a written record of the treatment you want. You can also choose which treatments you do not  want, which to limit, and which to stop at a certain time. This includes surgery, medicine, IV fluid, and tube feedings.   Durable power of  for healthcare (DPAHC):  This is a written record that states who you want to make healthcare choices for you when you are unable to make them for yourself. This person, called a proxy, is usually a family member or a friend. You may choose more than 1 proxy.  Do not resuscitate (DNR) order:  A DNR order is used in case your heart stops beating or you stop breathing. It is a request not to have certain forms of treatment, such as CPR. A DNR order may be included in other types of advance directives.  Medical directive:  This covers the care that you want if you are in a coma, near death, or unable to make decisions for yourself. You can list the treatments you want for each condition. Treatment may include pain medicine, surgery, blood transfusions, dialysis, IV or tube feedings, and a ventilator (breathing machine).  Values history:  This document has questions about your views, beliefs, and how you feel and think about life. This information can help others choose the care that you would choose.  Why are advance directives important?  An advance directive helps you control your care. Although spoken wishes may be used, it is better to have your wishes written down. Spoken wishes can be misunderstood, or not followed. Treatments may be given even if you do not want them. An advance directive may make it easier for your family to make difficult choices about your care.   Weight Management   Why it is important to manage your weight:  Being overweight increases your risk of health conditions such as heart disease, high blood pressure, type 2 diabetes, and certain types of cancer. It can also increase your risk for osteoarthritis, sleep apnea, and other respiratory problems. Aim for a slow, steady weight loss. Even a small amount of weight loss can lower your risk of  health problems.  How to lose weight safely:  A safe and healthy way to lose weight is to eat fewer calories and get regular exercise. You can lose up about 1 pound a week by decreasing the number of calories you eat by 500 calories each day.   Healthy meal plan for weight management:  A healthy meal plan includes a variety of foods, contains fewer calories, and helps you stay healthy. A healthy meal plan includes the following:  Eat whole-grain foods more often.  A healthy meal plan should contain fiber. Fiber is the part of grains, fruits, and vegetables that is not broken down by your body. Whole-grain foods are healthy and provide extra fiber in your diet. Some examples of whole-grain foods are whole-wheat breads and pastas, oatmeal, brown rice, and bulgur.  Eat a variety of vegetables every day.  Include dark, leafy greens such as spinach, kale, nayla greens, and mustard greens. Eat yellow and orange vegetables such as carrots, sweet potatoes, and winter squash.   Eat a variety of fruits every day.  Choose fresh or canned fruit (canned in its own juice or light syrup) instead of juice. Fruit juice has very little or no fiber.  Eat low-fat dairy foods.  Drink fat-free (skim) milk or 1% milk. Eat fat-free yogurt and low-fat cottage cheese. Try low-fat cheeses such as mozzarella and other reduced-fat cheeses.  Choose meat and other protein foods that are low in fat.  Choose beans or other legumes such as split peas or lentils. Choose fish, skinless poultry (chicken or turkey), or lean cuts of red meat (beef or pork). Before you cook meat or poultry, cut off any visible fat.   Use less fat and oil.  Try baking foods instead of frying them. Add less fat, such as margarine, sour cream, regular salad dressing and mayonnaise to foods. Eat fewer high-fat foods. Some examples of high-fat foods include french fries, doughnuts, ice cream, and cakes.  Eat fewer sweets.  Limit foods and drinks that are high in sugar. This  includes candy, cookies, regular soda, and sweetened drinks.  Exercise:  Exercise at least 30 minutes per day on most days of the week. Some examples of exercise include walking, biking, dancing, and swimming. You can also fit in more physical activity by taking the stairs instead of the elevator or parking farther away from stores. Ask your healthcare provider about the best exercise plan for you.      © Copyright [employer] 2018 Information is for End User's use only and may not be sold, redistributed or otherwise used for commercial purposes. All illustrations and images included in CareNotes® are the copyrighted property of [employer] or [employer]          History of Present Illness     HPI  Here for follow-up of paroxysmal atrial fibrillation, diabetes, hypertension, hyperlipidemia, tachybradycardia syndrome status post pacemaker, and CHF.  And Medicare wellness exam.  Doing well.  Good and bad days.  Had her 89nd birthday.  Notes that her sister is 94.  No longer driving.   Review of Systems  No chest pain.  Occasional shortness of breath with activity.  Sits down and feels better.  Bowels are okay.  Appetite is good.  Past Medical History:   Diagnosis Date   • Afib ([facility])    • Colon polyp 04/19/2018    Formatting of this note might be different from the original. Colonoscopy 4/19/18   • Hyperlipidemia    • Hypertension    • Pre-diabetes 10/10/2022    A1c about 6.2 since 2018   • s/p Medtronic single chamber PPM 3/26/2024 03/27/2024   • Sick sinus syndrome ([facility]) 04/08/2024    Pacer placed 3/26/24   • Stroke-like symptoms: vertigo and gait imbalance 09/22/2022   • Syncope 8/23   • Type 2 diabetes mellitus without complication, without long-term current use of insulin ([facility]) 10/21/2024    A1c 6.7 4/29/2024     • Urge incontinence of urine 05/06/2016     Past Surgical History:   Procedure Laterality Date   • CARDIAC ELECTROPHYSIOLOGY PROCEDURE N/A 03/26/2024    Procedure: Cardiac pacer implant;   Surgeon: Morgan Mccoy MD;  Location:  CARDIAC CATH LAB;  Service: Cardiology   • EYE SURGERY     • HYSTERECTOMY     • INSERT / REPLACE / REMOVE PACEMAKER  3/26/24   • KNEE SURGERY Bilateral 2012    Knee replacement-Dr. Liu     Social History     Social History Narrative     since  after 54 year marriage.      Three children.      Six grandchildren.  Seven great grandchildren.      Lives alone.  [place] her own [place].  2 daughters live very close.    -has a 94-year-old sister, 84-year-old brother and a 81-year-old sister.  Lost 1 sibling. Who  at 85.     Current Outpatient Medications on File Prior to Visit   Medication Sig   • amiodarone 200 mg tablet Take 1 tablet (200 mg total) by mouth daily   • amLODIPine (NORVASC) 5 mg tablet Take 1 tablet by mouth once daily   • atorvastatin (LIPITOR) 10 mg tablet Take 1 tablet by mouth once daily   • cholecalciferol (VITAMIN D3) 400 units tablet Take 1 tablet by mouth daily   • cyanocobalamin (VITAMIN B-12) 100 mcg tablet Take by mouth daily   • ELDERBERRY PO Take by mouth   • FISH OIL-KRILL OIL PO Take by mouth   • Garlic 580 MG CAPS Take 580 mg by mouth in the morning   • magnesium (MAGTAB) 84 MG (7MEQ) TBCR Take 250 mg by mouth daily   • Multiple Vitamin (Daily Value Multivitamin) TABS Take by mouth   • Saccharomyces boulardii (Probiotic) 250 MG CAPS Take by mouth 2 (two) times a day   • torsemide (DEMADEX) 10 mg tablet Take 1 tablet (10 mg total) by mouth daily   • Turmeric (QC Tumeric Complex) 500 MG CAPS Take 500 mg by mouth in the morning   • zinc gluconate 50 mg tablet Take 50 mg by mouth daily   • [DISCONTINUED] metoprolol tartrate (LOPRESSOR) 100 mg tablet Take 1.5 tablets (150 mg total) by mouth 2 (two) times a day   • [DISCONTINUED] rivaroxaban (XARELTO) 20 mg tablet Take 1 tablet (20 mg total) by mouth daily   • ECHINACEA-MAJANO SEAL COMPLEX PO Take 1,400 mg by mouth in the morning (Patient not taking: Reported on 2025)  "    Allergies   Allergen Reactions   • Lisinopril Hives     Other reaction(s): Angioedema   • Oxycodone-Acetaminophen Rash and GI Intolerance     Immunization History   Administered Date(s) Administered   • COVID-19 J&J ([employer]) vaccine 0.5 mL 04/10/2021, 01/25/2022     Objective   /78 (BP Location: Left arm, Patient Position: Sitting, Cuff Size: Large)   Pulse 82   Temp (!) 97.1 °F (36.2 °C) (Temporal)   Ht 5' 3\" (1.6 m)   Wt 117 kg (258 lb)   SpO2 96%   BMI 45.70 kg/m²     Physical Exam  Appears well.  Mood is upbeat.  Lungs are clear.  Heart regular.  Abdomen soft and nontender.  Trace edema.  A1c 6.3.    "

## 2025-04-21 NOTE — ASSESSMENT & PLAN NOTE
Lab Results   Component Value Date    HGBA1C 6.3 04/21/2025       Orders:  •  POCT hemoglobin A1c

## 2025-04-21 NOTE — ASSESSMENT & PLAN NOTE
Lab Results   Component Value Date    EGFR 35 12/13/2024    EGFR 51 04/29/2024    EGFR 59 03/27/2024    CREATININE 1.30 12/13/2024    CREATININE 0.96 04/29/2024    CREATININE 0.86 03/27/2024       Orders:  •  Basic metabolic panel; Future  •  CBC and differential; Future

## 2025-04-21 NOTE — ASSESSMENT & PLAN NOTE
Orders:  •  metoprolol tartrate (LOPRESSOR) 100 mg tablet; Take 1.5 tablets (150 mg total) by mouth 2 (two) times a day

## 2025-04-21 NOTE — ASSESSMENT & PLAN NOTE
Orders:  •  rivaroxaban (XARELTO) 20 mg tablet; Take 1 tablet (20 mg total) by mouth daily  •  T4, free; Future  •  TSH, 3rd generation; Future

## 2025-04-21 NOTE — PROGRESS NOTES
Name: Diana Godoy      : 1933      MRN: [id number]  Encounter Provider: Ezio Mckenna MD  Encounter Date: 2025   Encounter department: [facility]  :  Assessment & Plan  Medicare annual wellness visit, subsequent         Primary hypertension    Orders:  •  metoprolol tartrate (LOPRESSOR) 100 mg tablet; Take 1.5 tablets (150 mg total) by mouth 2 (two) times a day    Paroxysmal A-fib (HCC)    Orders:  •  rivaroxaban (XARELTO) 20 mg tablet; Take 1 tablet (20 mg total) by mouth daily  •  T4, free; Future  •  TSH, 3rd generation; Future    Type 2 diabetes mellitus without complication, without long-term current use of insulin (HCC)    Lab Results   Component Value Date    HGBA1C 6.3 2025       Orders:  •  POCT hemoglobin A1c    Stage 3a chronic kidney disease (HCC)  Lab Results   Component Value Date    EGFR 35 2024    EGFR 51 2024    EGFR 59 2024    CREATININE 1.30 2024    CREATININE 0.96 2024    CREATININE 0.86 2024       Orders:  •  Basic metabolic panel; Future  •  CBC and differential; Future    Mixed hyperlipidemia    s/p Medtronic single chamber PPM 3/26/2024    Morbid obesity (HCC)    Uses walker       Preventive health issues were discussed with patient, and age appropriate screening tests were ordered as noted in patient's After Visit Summary. Personalized health advice and appropriate referrals for health education or preventive services given if needed, as noted in patient's After Visit Summary.    History of Present Illness     HPI   Patient Care Team:  Ezio Mckenna MD as PCP - General (Family Medicine)  MD Elzbieta Villalobos MD Andrew Michael Curiale, MD (Cardiology)    Review of Systems  Medical History Reviewed by provider this encounter:  Tobacco  Allergies  Meds  Problems  Med Hx  Surg Hx  Fam Hx       Annual Wellness Visit Questionnaire   Diana is here for her Subsequent Wellness visit.      Health Risk Assessment:   Patient rates overall health as very good. Patient feels that their physical health rating is slightly worse. Patient is very satisfied with their life. Eyesight was rated as same. Hearing was rated as same. Patient feels that their emotional and mental health rating is same. Patients states they are never, rarely angry. Patient states they are sometimes unusually tired/fatigued. Pain experienced in the last 7 days has been none. Patient states that she has experienced no weight loss or gain in last 6 months.     Depression Screening:   PHQ-2 Score: 0      Fall Risk Screening:   In the past year, patient has experienced: history of falling in past year    Number of falls: 1  Injured during fall?: No    Feels unsteady when standing or walking?: Yes    Worried about falling?: Yes      Urinary Incontinence Screening:   Patient has leaked urine accidently in the last six months.     Home Safety:  Patient has trouble with stairs inside or outside of their home. Patient has working smoke alarms and has working carbon monoxide detector. Home safety hazards include: none.     Nutrition:   Current diet is Regular.     Medications:   Patient is currently taking over-the-counter supplements. OTC medications include: see medication list. Patient is not able to manage medications.     Activities of Daily Living (ADLs)/Instrumental Activities of Daily Living (IADLs):   Walk and transfer into and out of bed and chair?: Yes  Dress and groom yourself?: Yes    Bathe or shower yourself?: No    Feed yourself? Yes  Do your laundry/housekeeping?: Yes  Manage your money, pay your bills and track your expenses?: No  Make your own meals?: Yes    Do your own shopping?: No    Previous Hospitalizations:   Any hospitalizations or ED visits within the last 12 months?: No      Advance Care Planning:   Living will: Yes    Advanced directive: Yes      Preventive Screenings      Cardiovascular Screening:    General:  "Screening Not Indicated and History Lipid Disorder      Diabetes Screening:     General: Screening Not Indicated and History Diabetes      Colorectal Cancer Screening:     General: Screening Not Indicated      Cervical Cancer Screening:    General: Screening Not Indicated      Lung Cancer Screening:     General: Screening Not Indicated    Immunizations:  - Immunizations due: Influenza, Prevnar 20 and Zoster (Shingrix)    Screening, Brief Intervention, and Referral to Treatment (SBIRT)     Screening  Typical number of drinks in a day: 0  Typical number of drinks in a week: 0  Interpretation: Low risk drinking behavior.    Single Item Drug Screening:  How often have you used an illegal drug (including marijuana) or a prescription medication for non-medical reasons in the past year? never    Single Item Drug Screen Score: 0  Interpretation: Negative screen for possible drug use disorder    Social Drivers of Health     Financial Resource Strain: Low Risk  (10/30/2023)    Overall Financial Resource Strain (CARDIA)    • Difficulty of Paying Living Expenses: Not hard at all   Food Insecurity: No Food Insecurity (4/21/2025)    Hunger Vital Sign    • Worried About Running Out of Food in the Last Year: Never true    • Ran Out of Food in the Last Year: Never true   Transportation Needs: No Transportation Needs (4/21/2025)    PRAPARE - Transportation    • Lack of Transportation (Medical): No    • Lack of Transportation (Non-Medical): No   Housing Stability: Unknown (4/21/2025)    Housing Stability Vital Sign    • Unable to Pay for Housing in the Last Year: No    • Homeless in the Last Year: No   Utilities: Not At Risk (4/21/2025)    [facility] Utilities    • Threatened with loss of utilities: No     No results found.    Objective   /78 (BP Location: Left arm, Patient Position: Sitting, Cuff Size: Large)   Pulse 82   Temp (!) 97.1 °F (36.2 °C) (Temporal)   Ht 5' 3\" (1.6 m)   Wt 117 kg (258 lb)   SpO2 96%   BMI 45.70 kg/m² "     Physical Exam

## 2025-04-22 ENCOUNTER — RESULTS FOLLOW-UP (OUTPATIENT)
Dept: FAMILY MEDICINE CLINIC | Facility: CLINIC | Age: OVER 89
End: 2025-04-22

## 2025-04-22 NOTE — RESULT ENCOUNTER NOTE
Kidney function is stable, unchanged from 4 months ago.  Blood count normal.  Cholesterol is excellent.  Thyroid in the normal range.

## 2025-04-23 ENCOUNTER — REMOTE DEVICE CLINIC VISIT (OUTPATIENT)
Dept: CARDIOLOGY CLINIC | Facility: CLINIC | Age: OVER 89
End: 2025-04-23
Payer: COMMERCIAL

## 2025-04-23 DIAGNOSIS — Z95.0 CARDIAC PACEMAKER IN SITU: Primary | ICD-10-CM

## 2025-04-23 PROCEDURE — 93294 REM INTERROG EVL PM/LDLS PM: CPT | Performed by: INTERNAL MEDICINE

## 2025-04-23 PROCEDURE — 93296 REM INTERROG EVL PM/IDS: CPT | Performed by: INTERNAL MEDICINE

## 2025-04-23 NOTE — PROGRESS NOTES
Results for orders placed or performed in visit on 04/23/25   Cardiac EP device report    Narrative    MDT SC PM/ACTIVE SYSTEM IS MRI CONDITIONAL  CARELINK TRANSMISSION: BATTERY VOLTAGE ADEQUATE (12.4 YRS). -87% (>40% VVIR@70PPM). ALL AVAILABLE LEAD PARAMETERS WITHIN NORMAL LIMITS. NO SIGNIFICANT HIGH RATE EPISODES. NORMAL DEVICE FUNCTION. GV

## 2025-04-26 ENCOUNTER — RESULTS FOLLOW-UP (OUTPATIENT)
Dept: NON INVASIVE DIAGNOSTICS | Facility: HOSPITAL | Age: OVER 89
End: 2025-04-26

## 2025-04-29 DIAGNOSIS — E78.2 MIXED HYPERLIPIDEMIA: ICD-10-CM

## 2025-04-29 DIAGNOSIS — I50.22 CHRONIC SYSTOLIC CONGESTIVE HEART FAILURE (HCC): ICD-10-CM

## 2025-04-29 RX ORDER — ATORVASTATIN CALCIUM 10 MG/1
10 TABLET, FILM COATED ORAL DAILY
Qty: 90 TABLET | Refills: 1 | Status: SHIPPED | OUTPATIENT
Start: 2025-04-29

## 2025-04-29 RX ORDER — TORSEMIDE 10 MG/1
10 TABLET ORAL DAILY
Qty: 90 TABLET | Refills: 1 | Status: SHIPPED | OUTPATIENT
Start: 2025-04-29

## 2025-05-06 NOTE — PROGRESS NOTES
Cardiology Consultation     Diana Godoy  [id number]  4/12/1933  [facility] CARDIOLOGY [place]  [address] [facility] [address]      1. Sick sinus syndrome (HCC)        2. Cardiac pacemaker in situ        3. Chronic diastolic congestive heart failure (HCC)        4. Primary hypertension        5. Mixed hyperlipidemia        6. Paroxysmal A-fib (HCC)        7. Morbid obesity (HCC)                Discussion/Summary:  Tachybradycardia syndrome  -Patient had symptomatic conversion pauses lasting over 4 seconds  -Underwent single-chamber Medtronic PPM plantation with HIS lead in LPF region  -Last device interrogation 4/23/2025 showed normal device function, no significant high rates  Paroxysmal atrial fibrillation with RVR ([facility])  - Anticoagulated on Xarelto 20 mg daily  - Rate control with Lopressor 150 mg twice daily and amiodarone 200 mg daily  - Previously on digoxin 125 mcg daily, discontinued during hospitalization in March 2024  Chronic diastolic CHF  - TTE 3/25/2024 showed EF 60%, grade 1 DD, mild LAE, estimated PA pressure 34 mmHg  - Current diuretic Rx torsemide 10 mg daily-> instructed her she can take an extra dose of torsemide in the afternoon if her lower extremity edema worsens or weight increases  - Previous on HCTZ, however discontinued due to hyponatremia  - Has some dyspnea exertion as well as some mild lower extremity edema, instructed to take - dose of torsemide tomorrow  Hypertension  -Continue with amlodipine 5 mg daily, torsemide 10 mg daily and Lopressor 150 mg twice daily  -Blood pressure is mildly elevated today, will continue to monitor  Hyperlipidemia  - Continue with atorvastatin 10 mg daily  - Lipid profile 4/29/2024 showed total cholesterol 139, triglycerides 131, HDL 38, LDL 75  Morbid obesity  - BMI 46.8  LBBB (left bundle branch block)  CKD stage III    Follow-up in 6 months.     History of Present Illness:  Diana Godoy is a 89 y.o. year old female with a past  medical history of paroxysmal atrial fibrillation, hypertension, lipidemia, LBBB, tachybradycardia syndrome status post PPM, and CHF.    5/29/2024: She is accompanied by her daughters today.  Overall she is doing okay.  She has some chronic lower extremity edema which has been relatively stable.  Her daughters are concerned she has been a little bit unsteady on her feet and has had some falls at home.  She is not interested in doing physical therapy.    9/5/2024: She is accompanied by her daughters again today.  She continues to have some dyspnea on exertion and lower extremity edema, but reports is relatively stable.  It appears her legs were more swollen earlier in the week because she went out with her family and had her legs down for most of the day.  Denies any episodes of chest pain, palpitations, headache/dizziness, syncope, or other concerning cardiac symptoms at this time.    12/6/2024: She is accompanied by her daughters today.  Reports having some worsening lower extremity edema and having more difficulty putting her shoes on.  She has not been checking her weights regularly at home his weights have been fluctuating.  Daughters report they have the scale set up at home for her to use.  Reviewed instructions for checking her weight daily.    Interval history:  She is accompanied by her daughter today.  She reports having some worsening dyspnea on exertion.  Her weights have been relatively stable at home and her lower extremity edema has also been stable.  Denies any exertional chest pain, palpitations, lightheadedness/dizziness, or other concerning cardiac symptoms at this time.    Patient Active Problem List   Diagnosis    HTN (hypertension)    Paroxysmal A-fib (HCC)    Mixed hyperlipidemia    Colon polyp    Morbid obesity (HCC)    Urge incontinence of urine    Hyponatremia    LBBB (left bundle branch block)    s/p Medtronic single chamber PPM 3/26/2024    Chronic diastolic congestive heart failure (HCC)     Sick sinus syndrome (HCC)    Stage 3a chronic kidney disease (HCC)    Type 2 diabetes mellitus without complication, without long-term current use of insulin (HCC)     Past Medical History:   Diagnosis Date    Afib (HCC)     Colon polyp 2018    Formatting of this note might be different from the original. Colonoscopy 18    Hyperlipidemia     Hypertension     Pre-diabetes 10/10/2022    A1c about 6.2 since     s/p Medtronic single chamber PPM 3/26/2024 2024    Sick sinus syndrome (HCC) 2024    Pacer placed 3/26/24    Stroke-like symptoms: vertigo and gait imbalance 2022    Syncope     Type 2 diabetes mellitus without complication, without long-term current use of insulin (HCC) 10/21/2024    A1c 6.7 2024      Urge incontinence of urine 2016     Social History     Socioeconomic History    Marital status: Unknown     Spouse name: Not on file    Number of children: Not on file    Years of education: Not on file    Highest education level: Not on file   Occupational History    Not on file   Tobacco Use    Smoking status: Never     Passive exposure: Past (father smoked)    Smokeless tobacco: Never   Vaping Use    Vaping status: Never Used   Substance and Sexual Activity    Alcohol use: Never    Drug use: Never    Sexual activity: Not Currently   Other Topics Concern    Not on file   Social History Narrative     since  after 54 year marriage.      Three children.      Six grandchildren.  Seven great grandchildren.      Lives alone.  In her own house [place].  2 daughters live very close.    -has a 94-year-old sister, 84-year-old brother and a 81-year-old sister.  Lost 1 sibling. Who  at 85.     Social Drivers of Health     Financial Resource Strain: Low Risk  (10/30/2023)    Overall Financial Resource Strain (CARDIA)     Difficulty of Paying Living Expenses: Not hard at all   Food Insecurity: No Food Insecurity (2025)    Hunger Vital Sign      Worried About Running Out of Food in the Last Year: Never true     Ran Out of Food in the Last Year: Never true   Transportation Needs: No Transportation Needs (4/21/2025)    PRAPARE - Transportation     Lack of Transportation (Medical): No     Lack of Transportation (Non-Medical): No   Physical Activity: Not on file   Stress: Not on file   Social Connections: Not on file   Intimate Partner Violence: Not on file   Housing Stability: Unknown (4/21/2025)    Housing Stability Vital Sign     Unable to Pay for Housing in the Last Year: No     Number of Times Moved in the Last Year: Not on file     Homeless in the Last Year: No      Family History   Problem Relation Age of Onset    Hypertension Mother     Cancer Mother     Breast cancer Mother     Stroke Father     Breast cancer Sister      Past Surgical History:   Procedure Laterality Date    CARDIAC ELECTROPHYSIOLOGY PROCEDURE N/A 03/26/2024    Procedure: Cardiac pacer implant;  Surgeon: Morgan Mccoy MD;  Location: [facility];  Service: Cardiology    EYE SURGERY      HYSTERECTOMY      INSERT / REPLACE / REMOVE PACEMAKER  3/26/24    KNEE SURGERY Bilateral 2012    Knee replacement-Dr. Liu       Current Outpatient Medications:     amiodarone 200 mg tablet, Take 1 tablet (200 mg total) by mouth daily, Disp: 90 tablet, Rfl: 2    amLODIPine (NORVASC) 5 mg tablet, Take 1 tablet by mouth once daily, Disp: 90 tablet, Rfl: 3    atorvastatin (LIPITOR) 10 mg tablet, Take 1 tablet by mouth once daily, Disp: 90 tablet, Rfl: 1    cholecalciferol (VITAMIN D3) 400 units tablet, Take 1 tablet by mouth daily, Disp: , Rfl:     cyanocobalamin (VITAMIN B-12) 100 mcg tablet, Take by mouth daily, Disp: , Rfl:     ELDERBERRY PO, Take by mouth, Disp: , Rfl:     FISH OIL-KRILL OIL PO, Take by mouth, Disp: , Rfl:     Garlic 580 MG CAPS, Take 580 mg by mouth in the morning, Disp: , Rfl:     magnesium (MAGTAB) 84 MG (7MEQ) TBCR, Take 250 mg by mouth daily, Disp: , Rfl:     metoprolol  tartrate (LOPRESSOR) 100 mg tablet, Take 1.5 tablets (150 mg total) by mouth 2 (two) times a day, Disp: 270 tablet, Rfl: 3    Multiple Vitamin (Daily Value Multivitamin) TABS, Take by mouth, Disp: , Rfl:     rivaroxaban (XARELTO) 20 mg tablet, Take 1 tablet (20 mg total) by mouth daily, Disp: 90 tablet, Rfl: 3    Saccharomyces boulardii (Probiotic) 250 MG CAPS, Take by mouth 2 (two) times a day, Disp: , Rfl:     torsemide (DEMADEX) 10 mg tablet, Take 1 tablet by mouth once daily, Disp: 90 tablet, Rfl: 1    Turmeric (QC Tumeric Complex) 500 MG CAPS, Take 500 mg by mouth in the morning, Disp: , Rfl:     zinc gluconate 50 mg tablet, Take 50 mg by mouth daily, Disp: , Rfl:     ECHINACEA-MAJANO SEAL COMPLEX PO, Take 1,400 mg by mouth in the morning (Patient not taking: Reported on 4/21/2025), Disp: , Rfl:   Allergies   Allergen Reactions    Lisinopril Hives     Other reaction(s): Angioedema    Oxycodone-Acetaminophen Rash and GI Intolerance         Labs:  Lab Results   Component Value Date    ALT 20 04/29/2024    AST 15 04/29/2024    BUN 20 04/21/2025    CALCIUM 9.6 04/21/2025    CL 97 04/21/2025    CO2 34 (H) 04/21/2025    CREATININE 1.28 04/21/2025    HDL 59 04/21/2025    HCT 39.4 04/21/2025    HGB 12.7 04/21/2025    HGBA1C 6.3 04/21/2025    MG 2.1 03/25/2024    PHOS 3.5 08/16/2023     04/21/2025    K 4.9 04/21/2025    TRIG 117 04/21/2025    WBC 6.86 04/21/2025       Imaging: Cardiac EP device report    Result Date: 4/17/2024  Narrative: MDT SINGLE PM/ ACTIVE SYSTEM IS MRI CONDITIONAL NB/RATES-CARELINK TRANSMISSION: PRESENTING EGRAM @ 117 BPM. PT STILL HAVING FREQUENT RATES >100 BPM. PT TO SEE DR. CHISHOLM 4/19/24. NO SIGNIFICANT HIGH RATE EPISODES. ALL AVAILABLE LEAD PARAMETERS & TRENDS WITHIN NORMAL LIMITS. NC     Cardiac EP device report    Result Date: 4/11/2024  Narrative: MDT SINGLE PM/ ACTIVE SYSTEM IS MRI CONDITIONAL PT HEART RATE BTWN 120-130 BPM WHOLE TIME WHILE IN VISIT. METO WAS RECENTLY INCREASED BY PCP  DEVICE INTERROGATED IN THE [place] OFFICE: BATTERY VOLTAGE ADEQUATE-14 YRS.  64%. ALL AVAILABLE LEAD PARAMETERS WITHIN NORMAL LIMITS. NO SIGNIFICANT HIGH RATE EPISODES. NO PROGRAMMING CHANGES MADE TO DEVICE PARAMETERS. WOUND CHECK: INCISION CLEAN AND DRY WITH EDGES APPROXIMATED; STRIPS REMOVED; WOUND CARE AND RESTRICTIONS REVIEWED WITH PATIENT. NORMAL DEVICE FUNCTION. NC     XR chest portable    Result Date: 3/27/2024  Narrative: XR CHEST PORTABLE INDICATION: Patient s/p Pacemaker/ICD Insertion. COMPARISON: 3/23/2024 FINDINGS: Left chest wall intracardiac device with intact lead(s). No abandoned lead(s). Clear lungs. No pneumothorax or pleural effusion. Cardiomediastinal silhouette is stable. There is some atherosclerosis of the aorta. Degenerative arthritis at the shoulders. Normal upper abdomen.     Impression: Left-sided pacemaker with single transvenous lead. No pneumothorax or infiltrates. Workstation performed: [id number]     XR chest 2 views    Result Date: 3/27/2024  Narrative: XR CHEST PA & LATERAL INDICATION: Patient s/p Pacemaker/ICD Insertion - To be done post-procedure day one at 7a.m. and read before 9 a.m. Do not lift affected arm above shoulder. COMPARISON: CXR 3/26/2024 and 3/23/2024. FINDINGS: Clear lungs. No pneumothorax or pleural effusion. Mild cardiomegaly with left subclavian pacemaker lead in right ventricular septum. Bones are unremarkable for age. Mild benign eventration of the right diaphragm.     Impression: Pacemaker well-positioned with no pneumothorax. Workstation performed: [id number]     Cardiac ep lab eps/ablations    Result Date: 3/26/2024  Narrative: Images from the original result were not included. PPM - His lead in LPF region History and physical were reviewed Patient was examined and history was reviewed No change in patient's condition Since history and physical has been completed The pre- operative diagnosis: Symptomatic conversion pauses Underlying sick sinus syndrome  Paroxysmal / early persistent atrial fibrillation Hypertension Hyperlipidemia Hyponatremia Diastolic heart failure Postoperative diagnosis: Symptomatic conversion pauses Underlying sick sinus syndrome Paroxysmal / early persistent atrial fibrillation Hypertension Hyperlipidemia Hyponatremia Diastolic heart failure Procedure: Single  chamber PPM RV lead - His lead - with LPF capture Surgeon: Morgan Mccoy Assistants -none Specimens - none Estimated blood loss- 10 ml Findings-none Complications none Anesthesia-  Anesthesia by anaesthesiologist , local lidocaine by myself Details of the device Description of procedure: The patient was seen before the procedure. The details of the procedure was explained and patient agreed to the same. Appropriate consent was signed. The patient was brought to the electrophysiologic laboratory. Proper time out was done. Sterile dressing and draping was done. Local lidocaine was infiltrated, In the infraclavicular region at the site of device implant. Initial incision was made by a sharp number 10 blade. Thereafter electrocautery and blunt dissection was used to form a prepectoral pocket. Appropriate hemostasis was taken care of. 10 mL of radiocontrast, followed by 20 mL of saline, was injected in a peripheral vein on the side of the implant. Under direct visualization, the axillary vein was  Punctured,extra-thoracic. A single guidewire was inserted, and taking all the way to the inferior vena cava to confirm that it is on the right side. We also confirmed by the left anterior oblique view that the wire is in the right side.  A 7F sheath passed over first wire. A His sheath and lead was passed through the sheath Mapping of His bundle done Then went distally and inferiorly and left posterior fascicle (LPF) was mapped Position of the lead was confirmed in both [place] and [place] views Appropriate sensing and thresholds were noted. Lead was screwed in LPF position with non selective capture The  sheath was slit and removed. Once again the lead was tested for appropriate sensing, impedance and threshold.The lead was tied down to the prepectoral fascia and muscle. The patient was paced from the LPF  for second part of procedure The pocket was washed with large amounts of antibiotic saline. Appropriate hemostasis was taken care of. The lead was connected to the generator. The generator and leads were placed inside the pocket. The generator was tied down. Thereafter the device was interrogated and proper sensing and thresholds through the device were confirmed. Avatene - for oozing Antibiotic pouch The pocket was closed in 3 separate layers with intermittent sutures. Dressing was done with Steri-Strips, Aquacell Summary of the procedure: The patient came in to the laboratory for single -chamber device placement. The device has been placed and patient tolerated the procedure well      Echo complete w/ contrast if indicated    Result Date: 3/25/2024  Narrative:   Left Ventricle: Left ventricular cavity size is normal. Wall thickness is severely increased. There is moderate to severe concentric hypertrophy. The left ventricular ejection fraction is 60%. Systolic function is normal. Wall motion is normal. Diastolic function is mildly abnormal, consistent with grade I (abnormal) relaxation.   IVS: There is abnormal septal motion consistent with left bundle branch block.   Right Ventricle: Right ventricular cavity size is normal. Systolic function is normal.   Left Atrium: The atrium is mildly dilated.   Aortic Valve: The aortic valve is trileaflet. The leaflets are moderately thickened. The leaflets are mildly calcified. The leaflets exhibit normal mobility. There is aortic valve sclerosis.   Mitral Valve: There is moderate annular calcification.   Tricuspid Valve: The right ventricular systolic pressure is mildly elevated. The estimated right ventricular systolic pressure is 34 mmHg.     XR chest 1 view  "portable    Result Date: 3/23/2024  Narrative: XR CHEST PORTABLE INDICATION: near syncope. COMPARISON: 2022 FINDINGS: Clear lungs. No pneumothorax or pleural effusion. Unchanged cardiomediastinal silhouette. Atherosclerotic aorta. Bones are unremarkable for age. Normal upper abdomen.     Impression: No acute cardiopulmonary disease. Workstation performed: [id number]       EC2024: V paced at a rate of 74 bpm with occasional PVCs    Review of Systems:  Review of Systems   Constitutional:  Negative for chills, diaphoresis, fatigue and fever.   HENT:  Negative for congestion.    Eyes:  Negative for photophobia and visual disturbance.   Respiratory:  Negative for chest tightness.    Cardiovascular:  Positive for leg swelling. Negative for chest pain and palpitations.   Gastrointestinal:  Negative for abdominal distention, abdominal pain, diarrhea, nausea and vomiting.   Genitourinary:  Negative for difficulty urinating and dysuria.   Musculoskeletal:  Negative for arthralgias, gait problem and joint swelling.   Skin:  Negative for color change, pallor and rash.   Neurological:  Negative for dizziness, syncope, numbness and headaches.   Psychiatric/Behavioral:  Negative for agitation, behavioral problems and confusion.          Vitals:    25 1403   BP: 130/68   Pulse: 77   SpO2: 97%              Vitals:    25 1403   Weight: 116 kg (255 lb 6.4 oz)             Height: 5' 3\" (160 cm)     Physical Exam:  General appearance:  Appears stated age, alert, well appearing and in no distress  HEENT:  PERRLA, EOMI, no scleral icterus, no conjunctival pallor  NECK:  Supple, No elevated JVP, no thyromegaly, no carotid bruits  HEART: Tachycardic, regular rhythm, no significant audible murmurs  LUNGS: Mildly decreased breath sounds at bases bilaterally, otherwise clear to auscultation  ABDOMEN:  Soft, non-tender, positive bowel sounds, no rebound or guarding, no organomegaly   EXTREMITIES: 1+ bilateral lower " extremity edema/lymphedema, right greater than left  VASCULAR:  Normal pedal pulses   SKIN: No lesions or rashes on exposed skin  NEURO:  CN II-XII intact, no focal deficits

## 2025-05-07 ENCOUNTER — OFFICE VISIT (OUTPATIENT)
Dept: CARDIOLOGY CLINIC | Facility: CLINIC | Age: OVER 89
End: 2025-05-07
Payer: COMMERCIAL

## 2025-05-07 VITALS
DIASTOLIC BLOOD PRESSURE: 68 MMHG | OXYGEN SATURATION: 97 % | HEART RATE: 77 BPM | BODY MASS INDEX: 45.25 KG/M2 | WEIGHT: 255.4 LBS | SYSTOLIC BLOOD PRESSURE: 130 MMHG | HEIGHT: 63 IN

## 2025-05-07 DIAGNOSIS — Z95.0 CARDIAC PACEMAKER IN SITU: ICD-10-CM

## 2025-05-07 DIAGNOSIS — I10 PRIMARY HYPERTENSION: ICD-10-CM

## 2025-05-07 DIAGNOSIS — I50.32 CHRONIC DIASTOLIC CONGESTIVE HEART FAILURE (HCC): ICD-10-CM

## 2025-05-07 DIAGNOSIS — I49.5 SICK SINUS SYNDROME (HCC): Primary | ICD-10-CM

## 2025-05-07 DIAGNOSIS — E78.2 MIXED HYPERLIPIDEMIA: ICD-10-CM

## 2025-05-07 DIAGNOSIS — I48.0 PAROXYSMAL A-FIB (HCC): ICD-10-CM

## 2025-05-07 DIAGNOSIS — E66.01 MORBID OBESITY (HCC): ICD-10-CM

## 2025-05-07 PROCEDURE — G2211 COMPLEX E/M VISIT ADD ON: HCPCS | Performed by: STUDENT IN AN ORGANIZED HEALTH CARE EDUCATION/TRAINING PROGRAM

## 2025-05-07 PROCEDURE — 99214 OFFICE O/P EST MOD 30 MIN: CPT | Performed by: STUDENT IN AN ORGANIZED HEALTH CARE EDUCATION/TRAINING PROGRAM

## 2025-06-20 DIAGNOSIS — I48.0 PAROXYSMAL A-FIB (HCC): ICD-10-CM

## 2025-06-20 RX ORDER — AMIODARONE HYDROCHLORIDE 200 MG/1
200 TABLET ORAL DAILY
Qty: 90 TABLET | Refills: 3 | Status: SHIPPED | OUTPATIENT
Start: 2025-06-20

## 2025-08-01 ENCOUNTER — IN-CLINIC DEVICE VISIT (OUTPATIENT)
Dept: CARDIOLOGY CLINIC | Facility: CLINIC | Age: OVER 89
End: 2025-08-01
Payer: COMMERCIAL

## 2025-08-01 DIAGNOSIS — Z95.0 CARDIAC PACEMAKER IN SITU: Primary | ICD-10-CM

## 2025-08-01 PROCEDURE — 93279 PRGRMG DEV EVAL PM/LDLS PM: CPT | Performed by: INTERNAL MEDICINE

## (undated) DEVICE — INTRO SHEATH PEEL AWAY 7FR

## (undated) DEVICE — SLITTER ADJUSTABLE

## (undated) DEVICE — AVITENE MICROFIBRILLAR COLLAGEN HEMOSTAT NON-WOVEN WEB: Brand: AVITENE NON-WOVEN WEB

## (undated) DEVICE — CATH GUIDING FIXED SHAPE 43CM

## (undated) DEVICE — DGW .035 FC J3MM 150CM TEF: Brand: EMERALD